# Patient Record
Sex: MALE | Race: WHITE | NOT HISPANIC OR LATINO | Employment: OTHER | URBAN - NONMETROPOLITAN AREA
[De-identification: names, ages, dates, MRNs, and addresses within clinical notes are randomized per-mention and may not be internally consistent; named-entity substitution may affect disease eponyms.]

---

## 2017-07-09 DIAGNOSIS — I10 ESSENTIAL HYPERTENSION: Primary | ICD-10-CM

## 2017-09-15 DIAGNOSIS — I10 ESSENTIAL HYPERTENSION: ICD-10-CM

## 2017-09-15 RX ORDER — ENALAPRIL MALEATE 5 MG/1
TABLET ORAL
Qty: 90 TABLET | Refills: 0 | Status: SHIPPED | OUTPATIENT
Start: 2017-09-15 | End: 2017-12-14 | Stop reason: SDUPTHER

## 2017-10-04 ENCOUNTER — OFFICE VISIT (OUTPATIENT)
Dept: CARDIOLOGY | Facility: CLINIC | Age: 72
End: 2017-10-04

## 2017-10-04 VITALS
OXYGEN SATURATION: 99 % | DIASTOLIC BLOOD PRESSURE: 90 MMHG | SYSTOLIC BLOOD PRESSURE: 181 MMHG | HEIGHT: 68 IN | BODY MASS INDEX: 23.25 KG/M2 | WEIGHT: 153.4 LBS | HEART RATE: 62 BPM

## 2017-10-04 DIAGNOSIS — I05.9 MITRAL VALVE DISORDER: ICD-10-CM

## 2017-10-04 DIAGNOSIS — R01.1 HEART MURMUR: ICD-10-CM

## 2017-10-04 DIAGNOSIS — I48.0 PAROXYSMAL ATRIAL FIBRILLATION (HCC): ICD-10-CM

## 2017-10-04 DIAGNOSIS — I10 ESSENTIAL HYPERTENSION: ICD-10-CM

## 2017-10-04 DIAGNOSIS — R00.2 PALPITATIONS: Primary | ICD-10-CM

## 2017-10-04 PROCEDURE — 99214 OFFICE O/P EST MOD 30 MIN: CPT | Performed by: INTERNAL MEDICINE

## 2017-10-04 PROCEDURE — 93000 ELECTROCARDIOGRAM COMPLETE: CPT | Performed by: INTERNAL MEDICINE

## 2017-10-04 NOTE — PROGRESS NOTES
Subjective   Michael Mallory is a 72 y.o. male     Chief Complaint   Patient presents with   • Atrial Fibrillation   • Hypertension   • Heart Murmur   • Palpitations   • Cardiac Valve Problem     mitral       PROBLEM LIST:     1. Mitral valve disorder   1.1 Echo 12/5/14 - EF 55-60%; DD II; mod mitral prolapse, severe MR; jet medially directed; mild TR; PA 40-45; mild to mod [place]  1.2 Echo 9/18/16 - mild LVH; EF 55-60%; DD II; trace AR; prolapse of the posterior leaflet of mitral valve; prolapse of the P2 segment of the posterior mitral valve leaflet; severe MR; Mod TR; PA 40-45; mild GA  2. Palpitations/ extrasystoles      Specialty Problems        Cardiology Problems    Atrial fibrillation        Hypertension        Mitral valve disorder        Palpitations        Heart murmur                HPI:  Mr. 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    He has done well since the time of his last visit.  He continues to walk a mile a day at least 5 days a week, to play basketball intermittently and to do resistance training.  He has had no change in his exercise capacity.  He denies orthopnea, PND, or lower extremity edema.  He has no dizziness, presyncope, or syncope, although he does describe mild imbalance and he is walking through his house at night with limited visual clues.    Mr. Mallory describes occasional sense extrasystoles, but no sustained tachydysrhythmia.  He specifically denies any symptoms of arterial embolic events and, in particular, of TIA or stroke.  He has no symptoms of peripheral arterial disease.  Exercise capacity and exertional dyspnea have remained unchanged.  Her graft Mr. Mallory is to follow blood pressure regularly has not done so in some period of time.  He has no riders, chills, fevers, sweats, or weight loss.        CURRENT MEDICATION:    Current Outpatient  Prescriptions   Medication Sig Dispense Refill   • aspirin 81 MG EC tablet Take  by mouth daily.     • enalapril (VASOTEC) 5 MG tablet TAKE 1 TABLET DAILY 90 tablet 0   • metoprolol tartrate (LOPRESSOR) 25 MG tablet TAKE 1 TABLET THREE TIMES A DAY (Patient taking differently: bid) 270 tablet 1     No current facility-administered medications for this visit.        ALLERGIES:    Review of patient's allergies indicates no known allergies.    PAST MEDICAL HISTORY:    Past Medical History:   Diagnosis Date   • Anxiety    • Atrial fibrillation 8/22/2016   • Hypertension 8/22/2016   • Mitral valve disorder    • Palpitations 8/22/2016       SURGICAL HISTORY:    Past Surgical History:   Procedure Laterality Date   • HEMORRHOIDECTOMY         SOCIAL HISTORY:    Social History     Social History   • Marital status:      Spouse name: N/A   • Number of children: N/A   • Years of education: N/A     Occupational History   • Not on file.     Social History Main Topics   • Smoking status: Never Smoker   • Smokeless tobacco: Never Used   • Alcohol use Yes      Comment: infrequent and very little   • Drug use: No   • Sexual activity: Not on file     Other Topics Concern   • Not on file     Social History Narrative       FAMILY HISTORY:    Family History   Problem Relation Age of Onset   • Heart attack Father        Review of Systems   Constitutional: Negative.  Negative for activity change (stable strength and stamina), chills, diaphoresis, fatigue and fever.   HENT: Negative.    Eyes: Positive for visual disturbance (glasses prn).   Respiratory: Negative.  Negative for cough, choking, chest tightness, shortness of breath (no orthopnea or PND), wheezing and stridor.    Cardiovascular: Positive for palpitations. Negative for chest pain (no failure or angina symptoms) and leg swelling.   Gastrointestinal: Negative.  Negative for abdominal pain, blood in stool (no melena, no hematuria, no hematemesis, no hematochezia),  "constipation, diarrhea, nausea and vomiting.   Endocrine: Negative.  Negative for cold intolerance and heat intolerance.   Genitourinary: Positive for frequency (during night).   Musculoskeletal: Negative.    Skin: Negative.  Negative for color change, pallor, rash and wound.   Allergic/Immunologic: Negative.    Neurological: Negative.  Negative for dizziness, tremors, seizures, syncope, facial asymmetry (no stroke like symptoms), speech difficulty, weakness, light-headedness, numbness and headaches.   Hematological: Negative.    Psychiatric/Behavioral: Positive for sleep disturbance (up to BR).       VISIT VITALS:    BP (!) 181/90 (BP Location: Left arm, Patient Position: Sitting)  Pulse 62  Ht 68\" (172.7 cm)  Wt 153 lb 6.4 oz (69.6 kg)  SpO2 99%  BMI 23.32 kg/m2    RECENT LABS:    Objective       Physical Exam   Constitutional: He is oriented to person, place, and time. He appears well-developed and well-nourished. No distress.   HENT:   Head: Normocephalic and atraumatic.   Eyes: Conjunctivae and EOM are normal. Pupils are equal, round, and reactive to light.   Neck: Normal range of motion. Neck supple. Normal carotid pulses, no hepatojugular reflux and no JVD present. Carotid bruit is present (BL transmitted murmur ). No tracheal deviation present. No thyroid mass and no thyromegaly present.   Cardiovascular: Normal rate, regular rhythm and intact distal pulses.  Exam reveals no gallop and no friction rub.    Murmur (3/6 MR murmur) heard.  PMI is laterally displaced    Pulmonary/Chest: Effort normal and breath sounds normal. No respiratory distress. He has no wheezes. He has no rales. He exhibits no tenderness.   Abdominal: Soft. Bowel sounds are normal. He exhibits no distension, no abdominal bruit and no mass. There is no tenderness. There is no rebound and no guarding.   Negative organomegaly      Musculoskeletal: Normal range of motion. He exhibits no edema, tenderness or deformity.   Lymphadenopathy: "     He has no cervical adenopathy.     He has no axillary adenopathy.   Neurological: He is alert and oriented to person, place, and time. He has normal reflexes.   Skin: Skin is warm and dry. No rash noted. No erythema. No pallor.   Psychiatric: He has a normal mood and affect. His speech is normal and behavior is normal. Judgment and thought content normal. Cognition and memory are normal.   Nursing note and vitals reviewed.        ECG 12 Lead  Date/Time: 10/4/2017 11:13 AM  Performed by: GREGOR JONES  Authorized by: GREGOR JONES   Rhythm: sinus rhythm  Rate: normal  Conduction: 1st degree  QRS axis: left  Comments: LVH, LAD              Assessment/Plan    #1.  Severe mitral regurgitation related to myxomatous mitral valve disease and prolapse of the P2 cusp of the posterior mitral leaflet.  The patient remains asymptomatic of ischemia, heart failure, dysrhythmia except as described above, endocarditis, or embolism.  #2.  We will recheck echocardiogram to assess LV end-systolic dimensions.    #3.  I've asked Mr. Mallory to follow blood pressures carefully and have encouraged him to cooperate with medical recommendations to treat hypertension to reduce stroke heart failure and renal failure risk.    #4.  The patient also understands to report immediately for any symptoms which might represent ischemia, decreased LV function, or endocarditis.    #5.  The patient will follow-up with Dr. Jaimes as instructed and in our office in 6 months or on a when necessary basis.    #6.  Not noted above the patient a recent carotid duplex study which demonstrated no significant carotid disease and antegrade flow both vertebral arteries.           Diagnosis Plan   1. Palpitations  ECG 12 Lead   2. Essential hypertension  Adult Transthoracic Echo Complete W/ Cont if Necessary Per Protocol    Adult Transthoracic Echo Complete W/ Cont if Necessary Per Protocol   3. Heart murmur  Adult Transthoracic Echo Complete W/ Cont if  Necessary Per Protocol    Adult Transthoracic Echo Complete W/ Cont if Necessary Per Protocol   4. Mitral valve disorder  Adult Transthoracic Echo Complete W/ Cont if Necessary Per Protocol    Adult Transthoracic Echo Complete W/ Cont if Necessary Per Protocol   5. Paroxysmal atrial fibrillation         Return in about 6 months (around 4/4/2018), or if symptoms worsen or fail to improve, for Next scheduled follow up.         Volodymyr Syed MD   Scribed for Dr. Volodymyr Syed by HARPAL Aguirre October 4, 2017 12:07 PM

## 2017-10-04 NOTE — PATIENT INSTRUCTIONS
Monitor Heart rate and blood pressure. Call our office in 2 weeks with readings and will adjust BP meds from readings if need to be.

## 2017-10-25 ENCOUNTER — HOSPITAL ENCOUNTER (OUTPATIENT)
Dept: CARDIOLOGY | Facility: HOSPITAL | Age: 72
Discharge: HOME | End: 2017-10-25
Attending: INTERNAL MEDICINE | Admitting: INTERNAL MEDICINE

## 2017-10-25 ENCOUNTER — OUTSIDE FACILITY SERVICE (OUTPATIENT)
Dept: CARDIOLOGY | Facility: CLINIC | Age: 72
End: 2017-10-25

## 2017-10-25 PROCEDURE — 93306 TTE W/DOPPLER COMPLETE: CPT

## 2017-10-25 PROCEDURE — 93306 TTE W/DOPPLER COMPLETE: CPT | Performed by: INTERNAL MEDICINE

## 2017-10-30 ENCOUNTER — DOCUMENTATION (OUTPATIENT)
Dept: CARDIOLOGY | Facility: CLINIC | Age: 72
End: 2017-10-30

## 2017-10-31 ENCOUNTER — OFFICE VISIT (OUTPATIENT)
Dept: CARDIOLOGY | Facility: CLINIC | Age: 72
End: 2017-10-31

## 2017-10-31 VITALS
SYSTOLIC BLOOD PRESSURE: 194 MMHG | HEART RATE: 64 BPM | BODY MASS INDEX: 23.76 KG/M2 | OXYGEN SATURATION: 99 % | DIASTOLIC BLOOD PRESSURE: 99 MMHG | HEIGHT: 68 IN | WEIGHT: 156.8 LBS

## 2017-10-31 DIAGNOSIS — R06.02 SHORTNESS OF BREATH: Primary | ICD-10-CM

## 2017-10-31 DIAGNOSIS — I34.0 MITRAL VALVE INSUFFICIENCY, UNSPECIFIED ETIOLOGY: ICD-10-CM

## 2017-10-31 DIAGNOSIS — I27.20 PULMONARY HYPERTENSION (HCC): ICD-10-CM

## 2017-10-31 PROCEDURE — 99213 OFFICE O/P EST LOW 20 MIN: CPT | Performed by: PHYSICIAN ASSISTANT

## 2017-10-31 NOTE — PROGRESS NOTES
Problem list     Subjective   Michael Mallory is a 72 y.o. male     Chief Complaint   Patient presents with   • Palpitations     presents as a follow up       HPI    PROBLEM LIST:      1. Mitral valve disorder   1.1 Echo 12/5/14 - EF 55-60%; DD II; mod mitral prolapse, severe MR; jet medially directed; mild TR; PA 40-45; mild to mod [place]  1.2 Echo 9/18/16 - mild LVH; EF 55-60%; DD II; trace AR; prolapse of the posterior leaflet of mitral valve; prolapse of the P2 segment of the posterior mitral valve leaflet; severe MR; Mod TR; PA 40-45; mild FL  1.3 recent echocardiogram October 2017 demonstrated severe mitral regurgitation with pulmonary pressures estimated at 50-55 mmHg, moderate severe left atrial dilation and mild dilation of the left ventricle.  Moderate tricuspid regurgitation noted.  2. Palpitations/ extrasystoles     Patient is a 72-year-old male that presents back for routine follow up and review echocardiogram findings.  We'll follow patient in regards to severe mitral regurgitation and mitral prolapse.  Apparently, he has had this since the 1990s but has never had mitral valve repair.  Patient approximately 2 years ago was recommended to undergo repair but refuses.  He has excellent functional status even now.  He describes walking and playing basketball 5 times a week with no limitation.  He has no chest pain or shortness of breath whatsoever.  He denies PND orthopnea.  He doesn't palpitate or have dysrhythmic symptoms.  Patient describes that he is feeling great and otherwise is doing well.    Echocardiogram was reviewed with pertinent findings as above      Outpatient Encounter Prescriptions as of 10/31/2017   Medication Sig Dispense Refill   • aspirin 81 MG EC tablet Take 81 mg by mouth Daily.     • enalapril (VASOTEC) 5 MG tablet TAKE 1 TABLET DAILY 90 tablet 0   • metoprolol tartrate (LOPRESSOR) 25 MG tablet TAKE 1 TABLET THREE TIMES A DAY (Patient taking differently: patient takes BID) 270 tablet 1  "    No facility-administered encounter medications on file as of 10/31/2017.        Review of patient's allergies indicates no known allergies.    Past Medical History:   Diagnosis Date   • Anxiety    • Atrial fibrillation 8/22/2016   • Hypertension 8/22/2016   • Mitral valve disorder    • Palpitations 8/22/2016       Social History     Social History   • Marital status:      Spouse name: N/A   • Number of children: N/A   • Years of education: N/A     Occupational History   • Not on file.     Social History Main Topics   • Smoking status: Never Smoker   • Smokeless tobacco: Never Used   • Alcohol use Yes      Comment: infrequent and very little   • Drug use: No   • Sexual activity: Not on file     Other Topics Concern   • Not on file     Social History Narrative       Family History   Problem Relation Age of Onset   • Heart attack Father        Review of Systems   Constitutional: Negative.    HENT: Negative.    Eyes: Negative.    Respiratory: Positive for shortness of breath.    Cardiovascular: Negative.    Gastrointestinal: Negative.    Endocrine: Negative.    Genitourinary: Positive for frequency.   Musculoskeletal: Negative.    Skin: Negative.    Allergic/Immunologic: Negative.    Neurological: Negative.    Hematological: Negative.    Psychiatric/Behavioral: The patient is nervous/anxious.        Objective   Vitals:    10/31/17 1306   BP: (!) 194/99   BP Location: Left arm   Patient Position: Sitting   Pulse: 64   SpO2: 99%   Weight: 156 lb 12.8 oz (71.1 kg)   Height: 68\" (172.7 cm)      BP (!) 194/99 (BP Location: Left arm, Patient Position: Sitting)  Pulse 64  Ht 68\" (172.7 cm)  Wt 156 lb 12.8 oz (71.1 kg)  SpO2 99%  BMI 23.84 kg/m2    Lab Results (most recent)     None          Physical Exam   Constitutional: He is oriented to person, place, and time. He appears well-developed and well-nourished. No distress.   HENT:   Head: Normocephalic and atraumatic.   Eyes: EOM are normal. Pupils are equal, " round, and reactive to light.   Neck: No JVD present.   Cardiovascular: Normal rate, regular rhythm and normal heart sounds.  Exam reveals no gallop and no friction rub.    No murmur heard.  Severe MR murmur noted on exam on the entire precordium   Pulmonary/Chest: Effort normal and breath sounds normal. No respiratory distress. He has no wheezes. He has no rales. He exhibits no tenderness.   Abdominal: Soft.   Musculoskeletal: Normal range of motion. He exhibits no edema.   Neurological: He is alert and oriented to person, place, and time. No cranial nerve deficit.   Skin: Skin is warm and dry. No rash noted. No erythema. No pallor.   Psychiatric: He has a normal mood and affect. His behavior is normal.   Nursing note and vitals reviewed.      Procedure   Procedures       Assessment/Plan     Problems Addressed this Visit        Cardiovascular and Mediastinum    Mitral valve insufficiency    Relevant Orders    Adult Transthoracic Echo Complete W/ Cont if Necessary Per Protocol    Pulmonary hypertension    Relevant Orders    Adult Transthoracic Echo Complete W/ Cont if Necessary Per Protocol       Respiratory    Shortness of breath - Primary    Relevant Orders    Adult Transthoracic Echo Complete W/ Cont if Necessary Per Protocol            Recommendation  1.  I am concerned about increasing pulmonary pressures noted on this echocardiogram.  However, patient describes to me that he has excellent functional status and does not want to do any surgery at this time.  He is aware of the concern about declining critical mitral valve.  He does not want intervention at this time.  He has had discussion by multiple providers in the office recommending mitral valve replacement.  He refuses.  I would like to reevaluate his mitral regurgitation in a few months.  I discussed extensively that if he notices any change in symptoms such as chest pain shortness of breath or failure symptoms as discussed, he is to contact our office  immediately.  Otherwise, he is doing well at this time.  Blood pressure is well-controlled although elevated today.  He describes systolic readings in the 140s.  We will see back for follow-up after echocardiogram in a few months.  Follow-up primary as scheduled

## 2017-12-14 DIAGNOSIS — I10 ESSENTIAL HYPERTENSION: ICD-10-CM

## 2017-12-14 RX ORDER — ENALAPRIL MALEATE 5 MG/1
TABLET ORAL
Qty: 90 TABLET | Refills: 0 | Status: SHIPPED | OUTPATIENT
Start: 2017-12-14 | End: 2018-03-14 | Stop reason: SDUPTHER

## 2018-01-06 DIAGNOSIS — I10 ESSENTIAL HYPERTENSION: ICD-10-CM

## 2018-03-14 DIAGNOSIS — I10 ESSENTIAL HYPERTENSION: ICD-10-CM

## 2018-03-14 RX ORDER — ENALAPRIL MALEATE 5 MG/1
TABLET ORAL
Qty: 90 TABLET | Refills: 2 | Status: SHIPPED | OUTPATIENT
Start: 2018-03-14 | End: 2018-08-01 | Stop reason: ALTCHOICE

## 2018-04-25 ENCOUNTER — CLINICAL SUPPORT (OUTPATIENT)
Dept: CARDIOLOGY | Facility: CLINIC | Age: 73
End: 2018-04-25

## 2018-04-25 VITALS
DIASTOLIC BLOOD PRESSURE: 98 MMHG | BODY MASS INDEX: 23.67 KG/M2 | SYSTOLIC BLOOD PRESSURE: 163 MMHG | HEIGHT: 68 IN | OXYGEN SATURATION: 96 % | HEART RATE: 140 BPM | WEIGHT: 156.2 LBS

## 2018-04-25 DIAGNOSIS — I73.9 CLAUDICATION (HCC): ICD-10-CM

## 2018-04-25 DIAGNOSIS — R00.2 PALPITATIONS: Primary | ICD-10-CM

## 2018-04-25 DIAGNOSIS — I48.0 PAROXYSMAL ATRIAL FIBRILLATION (HCC): ICD-10-CM

## 2018-04-25 PROCEDURE — 93000 ELECTROCARDIOGRAM COMPLETE: CPT | Performed by: PHYSICIAN ASSISTANT

## 2018-04-25 NOTE — PROGRESS NOTES
Michael Mallory  1945 4/25/2018   ?   Chief Complaint   Patient presents with   • Palpitations     patient presents for a nurse visit       ?   HPI: patient presents today for a nurse visit. Patient states he has been having some palpitations and anxiety issues. Patient called our office with concerns. He is here for an EKG today.   ?   ?   ?     Current Outpatient Prescriptions:   •  aspirin 81 MG EC tablet, Take 81 mg by mouth Daily., Disp: , Rfl:   •  enalapril (VASOTEC) 5 MG tablet, TAKE 1 TABLET DAILY, Disp: 90 tablet, Rfl: 2  •  metoprolol tartrate (LOPRESSOR) 25 MG tablet, TAKE 1 TABLET THREE TIMES A DAY (Patient taking differently: TAKE 1 TABLET twice TIMES A DAY), Disp: 270 tablet, Rfl: 1   ?   ?   Review of patient's allergies indicates no known allergies.         ECG 12 Lead  Date/Time: 4/25/2018 4:11 PM  Performed by: OMARI MCLAUGHLIN  Authorized by: OMARI MCLAUGHLIN   Comments: Palpitations   A fib               ?   Assessment/Plan  Per Omari Mclaughlin PA-C: EKG: showed MAT. We will order Cardiac Event Monitor. We will also order a ultrasound of carotids.       ?   ?   ?

## 2018-07-07 DIAGNOSIS — I10 ESSENTIAL HYPERTENSION: ICD-10-CM

## 2018-08-01 ENCOUNTER — OFFICE VISIT (OUTPATIENT)
Dept: CARDIOLOGY | Facility: CLINIC | Age: 73
End: 2018-08-01

## 2018-08-01 VITALS
HEIGHT: 68 IN | SYSTOLIC BLOOD PRESSURE: 181 MMHG | BODY MASS INDEX: 23.19 KG/M2 | HEART RATE: 64 BPM | WEIGHT: 153 LBS | DIASTOLIC BLOOD PRESSURE: 103 MMHG | OXYGEN SATURATION: 99 %

## 2018-08-01 DIAGNOSIS — I10 ESSENTIAL HYPERTENSION: ICD-10-CM

## 2018-08-01 DIAGNOSIS — I27.20 PULMONARY HYPERTENSION (HCC): ICD-10-CM

## 2018-08-01 DIAGNOSIS — I34.0 MITRAL VALVE INSUFFICIENCY, UNSPECIFIED ETIOLOGY: ICD-10-CM

## 2018-08-01 DIAGNOSIS — I48.0 PAROXYSMAL ATRIAL FIBRILLATION (HCC): Primary | ICD-10-CM

## 2018-08-01 DIAGNOSIS — I05.9 MITRAL VALVE DISORDER: ICD-10-CM

## 2018-08-01 DIAGNOSIS — R06.02 SHORTNESS OF BREATH: ICD-10-CM

## 2018-08-01 DIAGNOSIS — R01.1 HEART MURMUR: ICD-10-CM

## 2018-08-01 DIAGNOSIS — R00.2 PALPITATIONS: ICD-10-CM

## 2018-08-01 PROCEDURE — 99214 OFFICE O/P EST MOD 30 MIN: CPT | Performed by: INTERNAL MEDICINE

## 2018-08-01 RX ORDER — OLMESARTAN MEDOXOMIL 40 MG/1
40 TABLET ORAL DAILY
Qty: 30 TABLET | Refills: 5 | Status: SHIPPED | OUTPATIENT
Start: 2018-08-01 | End: 2018-09-24

## 2018-08-01 NOTE — PROGRESS NOTES
Subjective   Michael Mallory is a 72 y.o. male     Chief Complaint   Patient presents with   • Palpitations     presents as a followup   • Atrial Fibrillation       PROBLEM LIST:       1. Mitral valve disorder   1.1 Echo 12/5/14 - EF 55-60%; DD II; mod mitral prolapse, severe MR; jet medially directed; mild TR; PA 40-45; mild to mod UT  1.2 Echo 9/18/16 - mild LVH; EF 55-60%; DD II; trace AR; prolapse of the posterior leaflet of mitral valve; prolapse of the P2 segment of the posterior mitral valve leaflet; severe MR; Mod TR; PA 40-45; mild UT  1.3 recent echocardiogram October 2017 demonstrated severe mitral regurgitation with pulmonary pressures estimated at 50-55 mmHg, moderate severe left atrial dilation and mild dilation of the left ventricle.  Moderate tricuspid regurgitation noted.  2. Palpitations/ extrasystoles  3. Atrial Fib  3.1 CHADS score 2        Specialty Problems        Cardiology Problems    Atrial fibrillation (CMS/HCC)        Hypertension        Mitral valve disorder        Palpitations        Heart murmur        Mitral valve insufficiency        Pulmonary hypertension                HPI:  Mr. 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    He relates an episode of atrial fibrillation earlier this year which was corroborated by EKG done in our office.  Symptoms resolved spontaneously.  Mr. Mallory describes a mild decrease in exercise capacity, but he continues to be without orthopnea, PND, lower extremity edema, or significant dizziness.    The patient brings a blood pressure log which shows significant elevation of systemic pressures over a prolonged period of time despite the patient's current medical regimen.    I reviewed several of the patient's prior studies.  There is been a gradual progression of pulmonary pressures on sequential echocardiograms, and left ventricular systolic performance was at the lower  limits of normal on the patient's most recent study.  As per documentation and multiple previous notes, the patient will not consider mitral valve replacement or repair.            CURRENT MEDICATION:    Current Outpatient Prescriptions   Medication Sig Dispense Refill   • aspirin 81 MG EC tablet Take 81 mg by mouth Daily.     • enalapril (VASOTEC) 5 MG tablet TAKE 1 TABLET DAILY 90 tablet 2   • metoprolol tartrate (LOPRESSOR) 25 MG tablet TAKE 1 TABLET THREE TIMES A  tablet 1     No current facility-administered medications for this visit.        ALLERGIES:    Patient has no known allergies.    PAST MEDICAL HISTORY:    Past Medical History:   Diagnosis Date   • Anxiety    • Atrial fibrillation (CMS/HCC) 8/22/2016   • Hypertension 8/22/2016   • Mitral valve disorder    • Palpitations 8/22/2016       SURGICAL HISTORY:    Past Surgical History:   Procedure Laterality Date   • HEMORRHOIDECTOMY     • HERNIA REPAIR         SOCIAL HISTORY:    Social History     Social History   • Marital status:      Spouse name: N/A   • Number of children: N/A   • Years of education: N/A     Occupational History   • Not on file.     Social History Main Topics   • Smoking status: Never Smoker   • Smokeless tobacco: Never Used   • Alcohol use Yes      Comment: infrequent and very little   • Drug use: No   • Sexual activity: Not on file     Other Topics Concern   • Not on file     Social History Narrative   • No narrative on file       FAMILY HISTORY:    Family History   Problem Relation Age of Onset   • Heart attack Father        Review of Systems   Constitutional: Negative for fatigue.   HENT: Negative.    Eyes: Positive for visual disturbance ( wears glasses).   Respiratory: Positive for shortness of breath (mildly).    Cardiovascular: Negative for chest pain, palpitations and leg swelling.   Gastrointestinal: Negative for constipation and diarrhea.   Endocrine: Negative.    Genitourinary: Positive for frequency. Negative  "for difficulty urinating.   Musculoskeletal: Negative for arthralgias, back pain and myalgias.   Skin: Negative.    Allergic/Immunologic: Negative for environmental allergies and food allergies.   Neurological: Negative for dizziness and light-headedness.   Hematological: Does not bruise/bleed easily.   Psychiatric/Behavioral: Negative for sleep disturbance. The patient is nervous/anxious.        VISIT VITALS:  Vitals:    08/01/18 0915   BP: (!) 181/103   BP Location: Left arm   Patient Position: Sitting   Pulse: 64   SpO2: 99%   Weight: 69.4 kg (153 lb)   Height: 172.7 cm (68\")      BP (!) 181/103 (BP Location: Left arm, Patient Position: Sitting)   Pulse 64   Ht 172.7 cm (68\")   Wt 69.4 kg (153 lb)   SpO2 99%   BMI 23.26 kg/m²     RECENT LABS:    Objective       Physical Exam   Constitutional: He is oriented to person, place, and time. He appears well-developed and well-nourished. No distress.   HENT:   Head: Normocephalic and atraumatic.   Eyes: Pupils are equal, round, and reactive to light. Conjunctivae and EOM are normal.   Neck: Normal range of motion. Neck supple. Normal carotid pulses, no hepatojugular reflux and no JVD present. Carotid bruit is present (Bilateral Transmitted murmur). No tracheal deviation present. No thyroid mass and no thyromegaly present.   Cardiovascular: Normal rate, regular rhythm and intact distal pulses.    Murmur (5/6 MR murmur) heard.  Pulses:       Carotid pulses are 2+ on the right side, and 2+ on the left side.       Radial pulses are 2+ on the right side, and 2+ on the left side.        Dorsalis pedis pulses are 2+ on the right side, and 2+ on the left side.        Posterior tibial pulses are 2+ on the right side, and 2+ on the left side.   Decreased S1  S2 splits physiologically.   Pulmonary/Chest: Effort normal and breath sounds normal. No respiratory distress. He has no decreased breath sounds. He has no wheezes. He has no rhonchi. He has no rales. He exhibits no " tenderness.   Abdominal: Soft. Bowel sounds are normal. He exhibits no distension, no abdominal bruit (neg. for organomegaly) and no mass. There is no splenomegaly or hepatomegaly. There is no tenderness. There is no rebound and no guarding.   MR murmur transmitted to abd.   Musculoskeletal: Normal range of motion. He exhibits no edema, tenderness or deformity.   Neurological: He is alert and oriented to person, place, and time.   Skin: Skin is warm and dry. No rash noted. He is not diaphoretic. No erythema. No pallor.   Psychiatric: He has a normal mood and affect. His speech is normal and behavior is normal. Judgment and thought content normal. Cognition and memory are normal.   Nursing note and vitals reviewed.      Procedures      Assessment/Plan   #1.  Myxomatous mitral valve disease with severe mitral regurgitation, left atrial enlargement, and moderately severe pulmonary hypertension with progression of PA pressures over time.  I'm very concerned that LV systolic performance is at the lower limits of normal which may portend a poor outcome with valve replacement.  It is likely that mitral valve repair could be tolerated however.    #2.  Paroxysmal atrial fibrillation.  The patient has a chads vas 2 score of 2 for age and hypertension.  We had an extended and detailed discussion about the need for anticoagulation for stroke prophylaxis in the setting of paroxysmal atrial fibrillation.  Therefore, Mr. Mallory agreed to start liquids 5 mg twice daily.  I impressed upon the patient, at multiple times throughout that discussion, that he cannot start and stop that medication of his own accord.  He expressed understanding.  He also understands to monitor for bleeding and that a look was would have no effect on progression of his valvular disease or hypertension as he queried on several occasions.    #3.  I again reiterated with Mr. Mallory symptoms which might represent valve progression and also tried to persuade him  to pursue further evaluation given the clinical and echocardiographic findings described above.  The patient expressed no interest in that regard.  #4.  For better blood pressure control we will stop enalapril and start Benicar 40 mg daily and uptitrate to tolerance and affect.  He performed through Dr. Jaimes office at the patient's preference.    #5.  Mr. Mallory will follow-up with Dr. Jaimes per her instructions, and in our office in 6 months or on a when necessary basis for symptoms or medication intolerances discussed in detail today.   Diagnosis Plan   1. Paroxysmal atrial fibrillation (CMS/HCC)     2. Essential hypertension     3. Palpitations     4. Heart murmur     5. Mitral valve disorder     6. Mitral valve insufficiency, unspecified etiology     7. Pulmonary hypertension     8. Shortness of breath         No Follow-up on file.         .     Patient's Body mass index is 23.26 kg/m². BMI is within normal parameters. No follow-up required.       Karolina Dang LPN        Electronically signed by:            This note is dictated utilizing voice recognition software.  Although this record has been proof read, transcriptional errors may still be present. If questions occur regarding the content of this record please do not hesitate to call our office.

## 2018-08-15 ENCOUNTER — TELEPHONE (OUTPATIENT)
Dept: CARDIOLOGY | Facility: CLINIC | Age: 73
End: 2018-08-15

## 2018-08-15 DIAGNOSIS — I10 ESSENTIAL HYPERTENSION: ICD-10-CM

## 2018-08-15 NOTE — TELEPHONE ENCOUNTER
DISCUSSED BELOW WITH CHRISTOPHER GARCIA AND HE RECOMMENDED PATIENT GO BACK TO THE 20 MG DAILY SINCE HAD LITTLE/NO SX'S AT ALL, MONITOR B/P AND IF AFTER A COUPLE OF WEEKS SX'S WORSEN/B/P STAYS ELEVATED ETC. TO CALL THE OFFICE. VERBALIZED HE WOULD TRY IT. PH,LPN           PATIENT STATES ON THE EVENING AFTER HE WAS SEEN HERE AT THE OFFICE HIS B/P /88, 141/87. STATES HE STARTED THE BENICAR AT 20 MG FOR A FEW DAYS AND THE LAST FEW DAYS HE STARTED TAKING THE PRESCRIBED 40 MG PO DAILY. WHILE ON THE 20 MG TAB HE STATES SX'S WERENT AS BAD OR HAD ANY AT ALL. ON THE 40 MG TAB HE HAS HAD INSOMNIA, MUSCLE ACHES AND DRY COUGH. HE THINKS THE 40 MG TAB IS DOING WHAT ITS SUPPOSED TO DO BECAUSE HIS B/P ON 40 MG YEST. /74 DAY BEFORE YEST. AND YEST. 112/68 H/R 69-73, BUT HE CAN'T TOLERATE IT. HE IS WANTING TO GO BACK ON THE ENALAPRIL, BECCAUSE HE HAS BEEN ON IT FOR YEARS AND HE KNOWS HE CAN TOLERATE IT. TOLD HIM I WOULD HAVE TO DISCUSS IT WITH DR. JONES AND WOULD CALL HIM BACK. DAMARIS ARCHULETA      ----- Message from Jesusita Nugent MA sent at 8/15/2018 11:57 AM EDT -----  Patient is calling to report blood pressures after being started on medications. Call back is [phone]

## 2018-08-20 ENCOUNTER — TELEPHONE (OUTPATIENT)
Dept: CARDIOLOGY | Facility: CLINIC | Age: 73
End: 2018-08-20

## 2018-08-20 DIAGNOSIS — I34.1 MVP (MITRAL VALVE PROLAPSE): ICD-10-CM

## 2018-08-20 DIAGNOSIS — I34.0 SEVERE MITRAL REGURGITATION: ICD-10-CM

## 2018-08-20 DIAGNOSIS — I05.9 MITRAL VALVE DISORDER: Primary | ICD-10-CM

## 2018-08-20 NOTE — TELEPHONE ENCOUNTER
PER  OK TO REFER TO [facility]  WITH WHOMEVER PATIENT WANTS TO SEE. PATIENT IS REQUESTING DR. ANTONIO ORO IF AT ALL POSSIBLE, BUT HE WANTS FIRST AVAILABLE WITH [employer]. PATIENT REQUESTING A CALL ONCE RECORDS FAXED TO [facility]. REFERRAL ORDER ENTERED. PATIENT HAD ALSO DROPPED OFF FMLA PAPERS. PER DR. JONES, FAMILY PCP NEEDS TO FILL OUT, BUT THAT WE CAN SUPPLY RECORDS IF NEEDED. PATIENT AWARE OF THIS, AND TO COME BACK BY OFFICE AND  FORMS. PH,LPN              PATIENT WALKED IN STATING HE WENT TO THE ER YEST. FOR COUGH, CHEST HURTING,SLEEPING ISSUES ETC. HAD LABS AND CXR DONE. ER RECORDS PRINTED AND TO BE REVIEWED BY DR. JONES. HE IS NOW AGREEING TO HAVE THE MV REPAIR DONE AS RECOMMENDED AT PREVIOUS VISIT ON 10-31-17 WHEN HE REFUSED IT.  HE MENTIONED GOING TO [facility] FOR TO HAVE THIS DONE. TOLD HIM I WOULD HAVE DISCUSS THIS WITH DR. JONES AND GET BACK WITH HIM HOPEFULLY DOMINIQUE. DAMARIS ARCHULETA

## 2018-08-22 ENCOUNTER — TELEPHONE (OUTPATIENT)
Dept: CARDIOLOGY | Facility: CLINIC | Age: 73
End: 2018-08-22

## 2018-08-22 ENCOUNTER — LAB (OUTPATIENT)
Dept: LAB | Facility: HOSPITAL | Age: 73
End: 2018-08-22

## 2018-08-22 DIAGNOSIS — I48.0 PAROXYSMAL ATRIAL FIBRILLATION (HCC): ICD-10-CM

## 2018-08-22 DIAGNOSIS — R06.02 SHORTNESS OF BREATH: ICD-10-CM

## 2018-08-22 DIAGNOSIS — R06.02 SHORTNESS OF BREATH: Primary | ICD-10-CM

## 2018-08-22 PROCEDURE — 83880 ASSAY OF NATRIURETIC PEPTIDE: CPT | Performed by: NURSE PRACTITIONER

## 2018-08-22 PROCEDURE — 80048 BASIC METABOLIC PNL TOTAL CA: CPT | Performed by: NURSE PRACTITIONER

## 2018-08-22 PROCEDURE — 83735 ASSAY OF MAGNESIUM: CPT | Performed by: NURSE PRACTITIONER

## 2018-08-22 PROCEDURE — 36415 COLL VENOUS BLD VENIPUNCTURE: CPT

## 2018-08-22 RX ORDER — FUROSEMIDE 20 MG/1
20 TABLET ORAL DAILY PRN
Qty: 30 TABLET | Refills: 11 | Status: SHIPPED | OUTPATIENT
Start: 2018-08-22 | End: 2018-09-24

## 2018-08-22 NOTE — TELEPHONE ENCOUNTER
SEE PREVIOUS TELEPHONE NOTE IN CHART. PER  FMLA PAPERS TO BE FILLED OUT BY FAMILY PCP. PATIENT AWARE. PH,LPN        ----- Message from Dorothy Avilez sent at 8/22/2018  3:44 PM EDT -----  The patient dropped off paperwork for fmla earlier today.  His employer called to let us know that those need to be faxed to them at [phone] ATTN: Gretchen once they are completed.  thanks

## 2018-08-22 NOTE — TELEPHONE ENCOUNTER
Patient walked in.  Apparently he is being referred for Mitral Valve replacement after being in ER.  He says that he is out of his Lasix and feels like he his crackling when he lays down at night.  I will resend in his lasix to use as needed.  I will get a BMP, BNP and MG today. I discussed this with Dr. Syed.  I also advised Karolina that I helped the patient, however, referral still needs to be put in.  Verbal understanding given.

## 2018-08-23 ENCOUNTER — TELEPHONE (OUTPATIENT)
Dept: CARDIOLOGY | Facility: CLINIC | Age: 73
End: 2018-08-23

## 2018-08-23 LAB
ANION GAP SERPL CALCULATED.3IONS-SCNC: 8.8 MMOL/L (ref 3.6–11.2)
BUN BLD-MCNC: 16 MG/DL (ref 7–21)
BUN/CREAT SERPL: 16 (ref 7–25)
CALCIUM SPEC-SCNC: 9.6 MG/DL (ref 7.7–10)
CHLORIDE SERPL-SCNC: 99 MMOL/L (ref 99–112)
CO2 SERPL-SCNC: 26.2 MMOL/L (ref 24.3–31.9)
CREAT BLD-MCNC: 1 MG/DL (ref 0.43–1.29)
GFR SERPL CREATININE-BSD FRML MDRD: 73 ML/MIN/1.73
GLUCOSE BLD-MCNC: 127 MG/DL (ref 70–110)
MAGNESIUM SERPL-MCNC: 2.4 MG/DL (ref 1.7–2.6)
OSMOLALITY SERPL CALC.SUM OF ELEC: 271 MOSM/KG (ref 273–305)
POTASSIUM BLD-SCNC: 4.5 MMOL/L (ref 3.5–5.3)
SODIUM BLD-SCNC: 134 MMOL/L (ref 135–153)

## 2018-08-23 NOTE — TELEPHONE ENCOUNTER
LAB RESULTS BRIEFLY DISCUSSED WITH PATIENT AND AWARE BNP STILL PENDING. I TOLD HIM I WOULD ASK DR. JONES AGAIN IF WE CAN FILL OUT HIS FMLA PAPERS FIRST OF THE WEEK. HE STATES HE RARELY SEE'S DR. MOTLEY AND SHE DOES NOT KNOW HIS CARDIAC HISTORY AND WE HAVE ALL HIS RECORDS. DAMARIS ARCHULETA          ----- Message from ANA Contreras sent at 8/23/2018  4:25 PM EDT -----  This is that patient of Kunal's from yesterday.  Can you please advise him of his labs.  Mainly looking at K, Cr and Mg.  Still waiting on BNP.  Thanks!

## 2018-08-24 ENCOUNTER — TELEPHONE (OUTPATIENT)
Dept: CARDIOLOGY | Facility: CLINIC | Age: 73
End: 2018-08-24

## 2018-08-24 LAB — NT-PROBNP SERPL-MCNC: 695 PG/ML (ref 0–376)

## 2018-08-24 NOTE — TELEPHONE ENCOUNTER
PATIENT NOT AT HOME, BUT SPOKE WITH HIS WIFE AND SHE STATES SHE THINKS HE HAS ALREADY SENT THE ECHO FILMS TO [facility]. GEREMIAS,AGNESN      ----- Message from Shanel Jackson sent at 8/24/2018  7:42 AM EDT -----   I spoke with Loulou beckford and she said that the patient picked up a copy of his echo on Wednesday to take with him.

## 2018-08-27 ENCOUNTER — TELEPHONE (OUTPATIENT)
Dept: CARDIOLOGY | Facility: CLINIC | Age: 73
End: 2018-08-27

## 2018-08-27 NOTE — TELEPHONE ENCOUNTER
Called and l/m at home number with BNP results. GEREMIAS,LPN          ----- Message from ANA Contreras sent at 8/27/2018  7:44 AM EDT -----  Kunal's patient.  Please advise BNP just slightly elevated.

## 2018-09-04 ENCOUNTER — TELEPHONE (OUTPATIENT)
Dept: CARDIOLOGY | Facility: CLINIC | Age: 73
End: 2018-09-04

## 2018-09-04 NOTE — TELEPHONE ENCOUNTER
"CHERY YING, JUANA AT [facility] HAD L/M THAT PATIENT WAS REFERRED TO THEM FOR \"BYPASS SURGERY\" AND THAT THEY HAD TO START HIM ON COUMADIN AFTER SURGERY AND THAT HE WAS AGREEABLE TO THIS. STATED HE WOULD BE DC'D WITHIN A DAY OR TWO AND WANTED TO SEE IF OUR OFFICE WOULD MANAGE HIS PT/INR. OK PER CHRISTOPHER QUEVEDO THAT OUR OFFICE WILL MANAGE HIS INR'S. I CALLED HER BACK AND SHE STATED THAT THEY ACTUALLY DID A MITRAL VALVE REPAIR, HE HAD REFUSED THE MAZE PROCEDURE, BUT THEY DID CLIP HIS LEFT ATRIAL APPENDAGE SINCE HISTORY OF A-FIB. STATED THAT THEIR DOCTOR'S DO NOT LIKE STARTING THEM ON ELIQUIS DIRECTLY POST-OP DUE TO BLEEDING RISK, BUT AFTER A MONTH POST-OP HE MAY BE ABLE TO BE SWITCHED OVER TO ELIQUIS THEN. STATED WHEN HE IS DC' D HOME SHE WILL FAX HIS D/C SUMMARY TO OUR OFFICE WITH HIS CURRENT COUMADIN DOSE AND INR RESULTS AND SHE IS GOING TOP INSTRUCT HIM TO CONTACT OUR OFFICE WITH DATE FOR NEXT DRAW. STATED THIS WOULD BE GREAT. WILL LET DR. JONES KNOW DOMINIQUE. ABOUT ABOVE. PH,LPN  "

## 2018-09-10 ENCOUNTER — ANTICOAGULATION VISIT (OUTPATIENT)
Dept: CARDIOLOGY | Facility: CLINIC | Age: 73
End: 2018-09-10

## 2018-09-10 ENCOUNTER — TELEPHONE (OUTPATIENT)
Dept: CARDIOLOGY | Facility: CLINIC | Age: 73
End: 2018-09-10

## 2018-09-10 LAB — INR PPP: 2.1 (ref 2–3)

## 2018-09-10 NOTE — PROGRESS NOTES
HAD BEEN ON 2 MG DAILY, BUT PER [facility] D/C INSTRUCTIONS HE WAS TO TAKE 1 MG S/S. HE ALSO TOOK LAST DOSE OF FLAGYL THIS AM FOR C-DIFF WHILE IN HOSP. NO DOSAGE CHANGE AND HAVE [facility] RECHECK LEVEL ON MONDAY PER CHRISTOPHER QUEVEDO. PATIENT HAS  AF/U APPT. AT  ON Thursday AND WILL BE STAYING OVERNIGHT SINCE 6 [address]. VERBAL ORDERS READ BACK AND VERIFIED BY CHRISTOPHER QUEVEDO. PATIENT'S WIFE AWARE VERBALIZED OK. PH,LPN

## 2018-09-10 NOTE — TELEPHONE ENCOUNTER
POLLY WITH [facility] HAD L/M THAT THEY ARE STARTING FIRST HOME CARE VISIT TODAY WITH HIM AND HE WAS REQUESTING A HOME PT/INR MONITOR. I SPOKE WITH HER AND SHE IS AWARE SINCE HE IS A NEW COUMADIN START AND DUE TO HIS INSURANCE WOULD PROBABLY NOT PAY FOR IT, AND THE FACT HE IS PROBABLY NOT GOING TO BE ON THE COUMADIN BUT FOR 3 MO. APPROX. HE WOULD NOT QUALIFY FOR A HOME MONITOR. SHE IS GOING TO LET HIM KNOW THIS. SHE ALSO STATED THEY HAD RECEIVED HIS HOSP. DISCHARGE INSTRUCTIONS AND HE IS TO HAVE A INR DRAWN TODAY. THEY ARE TO CALL THESE RESULTS TO ME ONCE THEY ARE BACK. IN MEANTIME PATIENT'S WIFE WALKED IN TO  HIS FMLA PAPEROWRK WE HAD FILLED OUT, SHE WAS TELLING ME [facility] HAD ADJUSTED HIS B/P MEDS AND HIS B/P THIS AM WAS 98/65 H/R 58, DENIES ANY DIZZINESS/PASSING OUT, JUST WEAK. STATES HE TOOK HIS LAST DOSE OF FLAGYL THIS AM FOR THE C-DIFF HE GOT IN THE HOSP. HE WAS TAKING 2 MG DAILY ON HIS COUMADIN, BUT SAT AND SUND. HE WAS INSTRUCTED TO TAKE 1 MG. CALLED AND SPOKE WITH VIKA IN MED. RECORDS AT [facility] TO GET RECORDS FROM ADMISSION 8-26-18 - 9-6-18, ALL CARDIA RECORDS, SATTED SHE WOULD GET THEM TO US. F/U APPT. FOR HERE AT THE OFFICE SCHEDULED 9-24-18 WITH DR. JONES, WIFE GIVEN APPT. DATE AND TIME. PH,LPN

## 2018-09-11 ENCOUNTER — TELEPHONE (OUTPATIENT)
Dept: CARDIOLOGY | Facility: CLINIC | Age: 73
End: 2018-09-11

## 2018-09-11 NOTE — TELEPHONE ENCOUNTER
PER DR. JONES D/EUGENIA LUCERO, MONITOR B/P AND H/R AND KEEP F/U APPT. HERE AT THE OFFICE. POLLY WITH [facility] AWARE AND AWARE HE IS TO CONTINUE 2 MG DAILY OF COUMADIN AND HAVE INR CHECKED ON Monday. VERBALIZED OK TO ALL ABOVE AND SHE IS GOING TO CALL MR. MALLORY AND LET HIM KNOW TO DC/ THE JAZMINE. GEREMIAS,LPN      ----- Message from Jesusita Nugent MA sent at 9/11/2018 11:07 AM EDT -----  Polly with [employer] called on patient Michael Mallory 1945. Patient states he is having some issues with bp. 92/57 pulse rate is 47. Patient is taking metoprolol and Cardizem. Patient wanting someone here to double check his medications. Call back for Polly is [phone]

## 2018-09-17 LAB — INR PPP: 2.2 (ref 2–3)

## 2018-09-18 ENCOUNTER — TELEPHONE (OUTPATIENT)
Dept: CARDIOLOGY | Facility: CLINIC | Age: 73
End: 2018-09-18

## 2018-09-18 ENCOUNTER — ANTICOAGULATION VISIT (OUTPATIENT)
Dept: CARDIOLOGY | Facility: CLINIC | Age: 73
End: 2018-09-18

## 2018-09-18 NOTE — TELEPHONE ENCOUNTER
NO DOSAGE CHANGE AND HAVE [facility] RECHECK LEVEL IN 1 WEEK PER CHRISTOPHER QUEVEDO. VERBAL ORDERS READ BACK AND VERIFIED BY CHRISTOPHER QUEVEDO. POLLY WITH [facility] AWARE, VERBALIZED OK. DAMARIS ARCHULETA        ----- Message from Jesusita Nugent MA sent at 9/17/2018  5:10 PM EDT -----  Zenaida from [facility] called to report the patient's INR that was drawn on 9/17/2018. Patient is on 2 mg daily. INR was 2.2 and PT is 25. Call back for any orders at [phone]

## 2018-09-18 NOTE — PROGRESS NOTES
NO DOSAGE CHANGE AND HAVE [facility]  RECHECK LEVEL IN 1 WEEK PER OMARI MCLAUGHLIN, PAC. VERBAL ORDERS READ BACK AND VERIFIED BY CHRISTOPHER QUEVEDO. POLLY WITH [facility] AWARE VERBALIZED OK. PH,LPN

## 2018-09-24 ENCOUNTER — ANTICOAGULATION VISIT (OUTPATIENT)
Dept: CARDIOLOGY | Facility: CLINIC | Age: 73
End: 2018-09-24

## 2018-09-24 ENCOUNTER — OFFICE VISIT (OUTPATIENT)
Dept: CARDIOLOGY | Facility: CLINIC | Age: 73
End: 2018-09-24

## 2018-09-24 VITALS
DIASTOLIC BLOOD PRESSURE: 87 MMHG | OXYGEN SATURATION: 99 % | SYSTOLIC BLOOD PRESSURE: 159 MMHG | HEIGHT: 68 IN | BODY MASS INDEX: 20.92 KG/M2 | HEART RATE: 47 BPM | WEIGHT: 138 LBS

## 2018-09-24 DIAGNOSIS — R01.1 HEART MURMUR: ICD-10-CM

## 2018-09-24 DIAGNOSIS — I10 ESSENTIAL HYPERTENSION: ICD-10-CM

## 2018-09-24 DIAGNOSIS — I34.0 MITRAL VALVE INSUFFICIENCY, UNSPECIFIED ETIOLOGY: ICD-10-CM

## 2018-09-24 DIAGNOSIS — Z98.890 H/O MITRAL VALVE REPAIR: ICD-10-CM

## 2018-09-24 DIAGNOSIS — I27.20 PULMONARY HYPERTENSION (HCC): ICD-10-CM

## 2018-09-24 DIAGNOSIS — R00.2 PALPITATIONS: ICD-10-CM

## 2018-09-24 DIAGNOSIS — I48.0 PAROXYSMAL ATRIAL FIBRILLATION (HCC): Primary | ICD-10-CM

## 2018-09-24 DIAGNOSIS — I05.9 MITRAL VALVE DISORDER: ICD-10-CM

## 2018-09-24 DIAGNOSIS — R06.02 SHORTNESS OF BREATH: ICD-10-CM

## 2018-09-24 LAB — INR PPP: 1.3 (ref 2–3)

## 2018-09-24 PROCEDURE — 99214 OFFICE O/P EST MOD 30 MIN: CPT | Performed by: INTERNAL MEDICINE

## 2018-09-24 RX ORDER — OXYCODONE HYDROCHLORIDE 5 MG/1
TABLET ORAL
COMMUNITY
Start: 2018-09-06 | End: 2018-10-22

## 2018-09-24 RX ORDER — METOPROLOL SUCCINATE 100 MG/1
100 TABLET, EXTENDED RELEASE ORAL DAILY
COMMUNITY
Start: 2018-09-06 | End: 2018-09-25 | Stop reason: SDUPTHER

## 2018-09-24 RX ORDER — WARFARIN SODIUM 2 MG/1
TABLET ORAL DAILY
COMMUNITY
Start: 2018-09-06 | End: 2018-09-25 | Stop reason: SDUPTHER

## 2018-09-24 RX ORDER — ENALAPRIL MALEATE 2.5 MG/1
2.5 TABLET ORAL DAILY
COMMUNITY
Start: 2018-09-07 | End: 2018-09-25 | Stop reason: SDUPTHER

## 2018-09-24 NOTE — PROGRESS NOTES
Subjective   Michael Mallory is a 73 y.o. male     Chief Complaint   Patient presents with   • Atrial Fibrillation     Here for f/u from [facility]   • Hypertension   • Palpitations   • Heart Murmur       PROBLEM LIST:       1. Mitral valve disorder   1.1 Echo 12/5/14 - EF 55-60%; DD II; mod mitral prolapse, severe MR; jet medially directed; mild TR; PA 40-45; mild to mod CA  1.2 Echo 9/18/16 - mild LVH; EF 55-60%; DD II; trace AR; prolapse of the posterior leaflet of mitral valve; prolapse of the P2 segment of the posterior mitral valve leaflet; severe MR; Mod TR; PA 40-45; mild CA  1.3 recent echocardiogram October 2017 demonstrated severe mitral regurgitation with pulmonary pressures estimated at 50-55 mmHg, moderate severe left atrial dilation and mild dilation of the left ventricle.  Moderate tricuspid regurgitation noted.  1.4 Mitral Valve Repair on 8-28-18 at [facility]  2. Palpitations/ extrasystoles  3. Atrial Fib  3.1 CHADS score 2  3.2 Left atrial appendage on 8-28-18 at [facility]              Specialty Problems        Cardiology Problems    Atrial fibrillation (CMS/HCC)        Hypertension        Mitral valve disorder        Palpitations        Heart murmur        Mitral valve insufficiency        Pulmonary hypertension                HPI:  Mr. Barba returns for follow-up after mitral valve repair done at [facility] last month.    Since last being seen here Michael developed 2 episodes of acute pulmonary congestion requiring IV diuretic therapy.  This was likely due to chordal rupture as initial echo at [facility] demonstrate ruptured cords with flail P2 cusp and confirmed severe mitral regurgitation.  He underwent placement of an annuloplasty ring and reconstruction of the chordal apparatus he had  the left atrial appendage, no Maze procedure was performed, and a liter of pleural fluid was drawn from each side in the perioperative period.  Postoperative course was  "unremarkable with persistent atrial fibrillation.    Since returning home Mr. Barba is gradually regained strength and stamina.  He feels that his heart is \"working more efficiently\" now despite the fact that he knows he is in predominantly atrial fibrillation.  He denies chest pain.  He has no orthopnea, PND, and only very mild lower extremity edema.  Alma is tolerating warfarin well and denies hemoptysis, hematemesis, melena, hematochezia, or hematuria.  He has had no symptoms of arterial embolic events.  His wife, who is a CBC was, brings in a blood pressure log her blood pressures are excellent controlled on current therapy with systolic blood pressures generally ranging between 101 120.              CURRENT MEDICATION:    Current Outpatient Prescriptions   Medication Sig Dispense Refill   • aspirin 81 MG EC tablet Take 81 mg by mouth Daily.     • enalapril (VASOTEC) 2.5 MG tablet Take 2.5 mg by mouth Daily.     • metoprolol succinate XL (TOPROL-XL) 100 MG 24 hr tablet Take 100 mg by mouth Daily.     • warfarin (COUMADIN) 2 MG tablet Daily.     • oxyCODONE (ROXICODONE) 5 MG immediate release tablet prn       No current facility-administered medications for this visit.        ALLERGIES:    Patient has no known allergies.    PAST MEDICAL HISTORY:    Past Medical History:   Diagnosis Date   • Anxiety    • Atrial fibrillation (CMS/HCC) 8/22/2016   • Hypertension 8/22/2016   • Mitral valve disorder    • Palpitations 8/22/2016       SURGICAL HISTORY:    Past Surgical History:   Procedure Laterality Date   • ATRIAL APPENDAGE EXCLUSION LEFT WITH TRANSESOPHAGEAL ECHOCARDIOGRAM     • HEMORRHOIDECTOMY     • HERNIA REPAIR     • MITRAL VALVE REPAIR/REPLACEMENT         SOCIAL HISTORY:    Social History     Social History   • Marital status:      Spouse name: N/A   • Number of children: N/A   • Years of education: N/A     Occupational History   • Not on file.     Social History Main Topics   • Smoking status: Never " "Smoker   • Smokeless tobacco: Never Used   • Alcohol use Yes      Comment: infrequent and very little   • Drug use: No   • Sexual activity: Not on file     Other Topics Concern   • Not on file     Social History Narrative   • No narrative on file       FAMILY HISTORY:    Family History   Problem Relation Age of Onset   • Heart attack Father        Review of Systems   Constitutional: Positive for fatigue (improving).   HENT: Negative.    Eyes: Positive for visual disturbance (glasses prn).   Respiratory: Negative.    Cardiovascular: Positive for leg swelling (mildly). Negative for chest pain and palpitations.   Gastrointestinal: Negative.  Negative for blood in stool (no melena,hematochezia,hematuria,hemoptysis).   Endocrine: Negative.    Genitourinary: Negative.    Musculoskeletal: Negative.    Skin: Negative.    Allergic/Immunologic: Negative.    Neurological: Negative.         Denies stroke like sx's   Hematological: Negative.    Psychiatric/Behavioral: Positive for sleep disturbance.       VISIT VITALS:  Vitals:    09/24/18 1013   BP: 159/87   BP Location: Left arm   Patient Position: Sitting   Pulse: (!) 47   SpO2: 99%   Weight: 62.6 kg (138 lb)   Height: 172.7 cm (67.99\")      /87 (BP Location: Left arm, Patient Position: Sitting)   Pulse (!) 47 Comment: per b/p machine  Ht 172.7 cm (67.99\")   Wt 62.6 kg (138 lb)   SpO2 99%   BMI 20.99 kg/m²     RECENT LABS:    Objective       Physical Exam   Constitutional: He is oriented to person, place, and time. He appears well-developed and well-nourished. No distress.   HENT:   Head: Normocephalic and atraumatic.   Eyes: Pupils are equal, round, and reactive to light. Conjunctivae and EOM are normal.   Neck: Normal range of motion. Neck supple. No JVD present. Carotid bruit is not present. No tracheal deviation present.   NL. Carotid upstrokes   Cardiovascular: Normal rate, normal heart sounds and intact distal pulses.  An irregular rhythm present.   Apical " rate approx. 100  Radial rate 80  No mitral regurg. Or stenosis noted   Pulmonary/Chest: Effort normal and breath sounds normal. He has no wheezes. He has no rhonchi. He has no rales.   NL. Exp. Phase  No dullness noted   Abdominal: Soft. Bowel sounds are normal. He exhibits no distension and no mass. There is no tenderness. There is no rebound and no guarding.   Musculoskeletal: Normal range of motion. He exhibits edema. He exhibits no tenderness or deformity.   LLE, 1+ edema to sock line, palpable pulses  RLE, trace edema , palpable pulses   Neurological: He is alert and oriented to person, place, and time.   Skin: Skin is warm and dry. No rash noted. No erythema. No pallor.   Psychiatric: He has a normal mood and affect. His behavior is normal. Judgment and thought content normal.   Nursing note and vitals reviewed.      Procedures      Assessment/Plan   #1.  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  Although heart rate is slightly suboptimal at this time, I'm concerned about increasing negative dromotropic therapy as I think the heart rate will slow myocardial edema and diastolic dysfunction improved.  Therefore, we will make no medication changes.    #2.  I have asked the patient to bring in his blood pressure cuff so we can ensure pressures are being recorded accurately.  The patient continues with persistent systemic hypertension we will adjust medications accordingly.    #3.  I encouraged Mr. Mallory to continue his efforts at exercise with gradually increasing levels until he feels he is at least that his prior baseline.  I also encouraged him not to limit his caloric or protein intake.    #4.  I would like to verify with [facility] at their recommendation that he be on long-term anticoagulation after 3 months of warfarin.  For this the case, I don't understand the rationale of left  atrial appendage exclusion.    #5.  We will perform transthoracic echocardiogram for a postsurgical baseline at this institution.    #6.  Mr. Mallory will follow-up with Dr. Jaimes as instructed by her office, and in our office in one month or on a when necessary basis for symptoms as discussed today.   Diagnosis Plan   1. Paroxysmal atrial fibrillation (CMS/HCC)     2. Essential hypertension     3. Mitral valve disorder     4. Palpitations     5. Heart murmur     6. Mitral valve insufficiency, unspecified etiology     7. Pulmonary hypertension     8. Shortness of breath         No Follow-up on file.              Patient's Body mass index is 20.99 kg/m². BMI is within normal parameters. No follow-up required.       Karolina Dang LPN    Scribed for Dr. Volodymyr Syed by Karolina Dang LPN September 24, 2018 10:47 AM         Electronically signed by:            This note is dictated utilizing voice recognition software.  Although this record has been proof read, transcriptional errors may still be present. If questions occur regarding the content of this record please do not hesitate to call our office.

## 2018-09-24 NOTE — PROGRESS NOTES
INR 1.3 CALLED TO OFFICE TODAY PER [facility] PER PATRICIA KRAFT. PER DR. JONES TAKE 4 MG X 3 DAYS THEN  CHANGE TO 3 MG DAILY AND RECHECK LEVEL ON FRIDAY. VERBAL ORDERS READ BACK AND VERIFIED BY DR. JONES. PATIENT AND WIFE AWARE, VERBALIZED OK. PH,LPN

## 2018-09-25 ENCOUNTER — TELEPHONE (OUTPATIENT)
Dept: CARDIOLOGY | Facility: CLINIC | Age: 73
End: 2018-09-25

## 2018-09-25 DIAGNOSIS — I05.9 MITRAL VALVE DISORDER: ICD-10-CM

## 2018-09-25 DIAGNOSIS — I10 ESSENTIAL HYPERTENSION: ICD-10-CM

## 2018-09-25 DIAGNOSIS — I48.0 PAROXYSMAL ATRIAL FIBRILLATION (HCC): Primary | ICD-10-CM

## 2018-09-25 RX ORDER — METOPROLOL SUCCINATE 100 MG/1
100 TABLET, EXTENDED RELEASE ORAL DAILY
Qty: 30 TABLET | Refills: 11 | Status: SHIPPED | OUTPATIENT
Start: 2018-09-25 | End: 2018-10-22 | Stop reason: ALTCHOICE

## 2018-09-25 RX ORDER — ENALAPRIL MALEATE 2.5 MG/1
2.5 TABLET ORAL DAILY
Qty: 30 TABLET | Refills: 11 | Status: SHIPPED | OUTPATIENT
Start: 2018-09-25 | End: 2018-11-28

## 2018-09-25 RX ORDER — WARFARIN SODIUM 3 MG/1
3 TABLET ORAL DAILY
Qty: 30 TABLET | Refills: 11 | Status: SHIPPED | OUTPATIENT
Start: 2018-09-25 | End: 2018-11-28

## 2018-09-25 NOTE — TELEPHONE ENCOUNTER
Patient's wife called stating the patient is needing refills on enalapril, warfarin, and metoprolol succ. I will send these to [facility] pharmacy in [place].

## 2018-09-28 ENCOUNTER — TELEPHONE (OUTPATIENT)
Dept: CARDIOLOGY | Facility: CLINIC | Age: 73
End: 2018-09-28

## 2018-09-28 LAB — INR PPP: 1.7 (ref 2–3)

## 2018-09-28 NOTE — TELEPHONE ENCOUNTER
Recieved a call from the lab with results on pt. Was informed that pt was on 4mg Coumadin Monday, Tuesday, and Wednesday, then dropped to 3mg. States his Protime was 20.5 and INR was 1.7. Was informed that pt needs to know how to proceed with coumadin, unsure of dose.     Spoke w/ DAMARIS Smith, whom stated she will find out plan.

## 2018-09-28 NOTE — TELEPHONE ENCOUNTER
Spoke with Shakir Williamson PA-C re: patient's recent PT/INR. Order received from Shakir for patient to take Coumadin 4 mg on Mon, Wed, Fri, and Sat, with 3 mg on all other days. Repeat INR in 1 week. Called number listed for patient and left a detailed message for him.

## 2018-10-01 ENCOUNTER — ANTICOAGULATION VISIT (OUTPATIENT)
Dept: CARDIOLOGY | Facility: CLINIC | Age: 73
End: 2018-10-01

## 2018-10-01 DIAGNOSIS — I48.91 ATRIAL FIBRILLATION, UNSPECIFIED TYPE (HCC): Primary | ICD-10-CM

## 2018-10-01 RX ORDER — WARFARIN SODIUM 4 MG/1
TABLET ORAL
Qty: 30 TABLET | Refills: 5 | Status: SHIPPED | OUTPATIENT
Start: 2018-10-01 | End: 2018-11-28

## 2018-10-01 NOTE — TELEPHONE ENCOUNTER
PATIENT AND WIFE HERE TO COMPARE OUR B/P MACHINE WITH THEIR HOME MONITOR.THERE WAS APPROX. 13 POINT SYSTOLIC DIFFERENCE IN OUR B/P MACHINE AND THEIR NEW MONITOR AND APPROX. 40 POINTS SYSTOLIC DIFFERENCE IN THEIR OLD MACHINE COMPARED TO OURS. REQUESTED 4 MG COUMAIDN SCRIPT TO THEIR PHARMACY, DONE. GEREMIAS,LPN

## 2018-10-01 NOTE — PROGRESS NOTES
INR WAS 1.7 ON Friday, 9-28-18, CHRISTOPHER QUEVEDO CHANGED DOSE TO 4 MG M/W/F/SAT AND 3 MG ALL OTHER DAY'S AND HAVE [facility] RECHECK LEVEL IN 1 WEEK. PER PATIENT'S CHART, TELEPHONE NOTE. GEREMIAS,LPN

## 2018-10-04 ENCOUNTER — TELEPHONE (OUTPATIENT)
Dept: CARDIOLOGY | Facility: CLINIC | Age: 73
End: 2018-10-04

## 2018-10-04 NOTE — TELEPHONE ENCOUNTER
VICKEY FROM [facility] NEEDING ORDER TO  HAVE INR DRAWN DOMINIQUE. STATES WIFE TOLD THEM IT WAS DUE DOMINIQUE. OK PER OMARI MCLAUGHLIN, PAC TO HAVE [facility] DRAW INR. VICKEY AWARE, AND TO TRY AND DRAW EARLY DOMINIQUE. SINCE OFFICE CLOSES AT NOON. VERBALIZED OK. PH,LPN

## 2018-10-05 ENCOUNTER — TELEPHONE (OUTPATIENT)
Dept: CARDIOLOGY | Facility: CLINIC | Age: 73
End: 2018-10-05

## 2018-10-05 ENCOUNTER — ANTICOAGULATION VISIT (OUTPATIENT)
Dept: CARDIOLOGY | Facility: CLINIC | Age: 73
End: 2018-10-05

## 2018-10-05 LAB — INR PPP: 2 (ref 2–3)

## 2018-10-05 NOTE — TELEPHONE ENCOUNTER
NO DOSAGE CHANGE AND HAVE [facility] RECHECK LEVEL IN 1 WEEK PER CHRISTOPHER QUEVEDO. VERBAL ORDERS READ BACK AND VERIFIED BY CHRISTOPHER QUEVEDO. VICKEY WITH [facility] AWARE VERBALIZED OK. DAMARIS ARCHULETA          ----- Message from Shanel Jackson sent at 10/5/2018  9:30 AM EDT -----   [facility] called to report Michael's PT-INR.     Pt on Coumadin 4mg x4 days and 3mg x3 days    Draw today was 2.0 INR, 24.2 PT

## 2018-10-05 NOTE — PROGRESS NOTES
NO DOSAGE CHANGE AND HAVE [facility] RECHECK LEVEL IN 1 WEEK PER OMARI MCLAUGHLIN PAC. VERBAL ORDERS READ BACK AND VERIFIED BY CHRISTOPHER QUEVEDO. VICKEY WITH [facility] AWARE VERBALIZED OK. PH,AGNESN

## 2018-10-12 LAB — INR PPP: 2.2 (ref 2–3)

## 2018-10-15 ENCOUNTER — ANTICOAGULATION VISIT (OUTPATIENT)
Dept: CARDIOLOGY | Facility: CLINIC | Age: 73
End: 2018-10-15

## 2018-10-15 NOTE — PROGRESS NOTES
MESSAGE LEFT FOR ME ON PIECE OF PAPER THAT INR WAS 2.2 PT 26 DRAWN Friday PER [facility]. PER NOTE JIM JOHNSON LPN SPOKE WITH SUSHIL AT [facility] TO LEAVE CURRENT DOSING AND RECHECK LEVEL IN 1-2 WEEKS.. GEREMIAS,LPN

## 2018-10-18 ENCOUNTER — HOSPITAL ENCOUNTER (OUTPATIENT)
Dept: CARDIOLOGY | Facility: HOSPITAL | Age: 73
Discharge: HOME | End: 2018-10-18
Attending: INTERNAL MEDICINE | Admitting: INTERNAL MEDICINE

## 2018-10-18 PROCEDURE — 93306 TTE W/DOPPLER COMPLETE: CPT

## 2018-10-22 ENCOUNTER — OFFICE VISIT (OUTPATIENT)
Dept: CARDIOLOGY | Facility: CLINIC | Age: 73
End: 2018-10-22

## 2018-10-22 ENCOUNTER — TELEPHONE (OUTPATIENT)
Dept: CARDIOLOGY | Facility: CLINIC | Age: 73
End: 2018-10-22

## 2018-10-22 VITALS
OXYGEN SATURATION: 93 % | WEIGHT: 139.6 LBS | BODY MASS INDEX: 21.16 KG/M2 | SYSTOLIC BLOOD PRESSURE: 162 MMHG | HEART RATE: 68 BPM | HEIGHT: 68 IN | DIASTOLIC BLOOD PRESSURE: 103 MMHG

## 2018-10-22 DIAGNOSIS — I48.0 PAROXYSMAL ATRIAL FIBRILLATION (HCC): ICD-10-CM

## 2018-10-22 DIAGNOSIS — R00.2 PALPITATIONS: ICD-10-CM

## 2018-10-22 DIAGNOSIS — Z98.890 H/O MITRAL VALVE REPAIR: ICD-10-CM

## 2018-10-22 DIAGNOSIS — I27.20 PULMONARY HYPERTENSION (HCC): Primary | ICD-10-CM

## 2018-10-22 DIAGNOSIS — I05.9 MITRAL VALVE DISORDER: ICD-10-CM

## 2018-10-22 DIAGNOSIS — R01.1 HEART MURMUR: ICD-10-CM

## 2018-10-22 DIAGNOSIS — I10 ESSENTIAL HYPERTENSION: ICD-10-CM

## 2018-10-22 DIAGNOSIS — R06.02 SHORTNESS OF BREATH: ICD-10-CM

## 2018-10-22 DIAGNOSIS — R93.1 DECREASED CARDIAC EJECTION FRACTION: ICD-10-CM

## 2018-10-22 DIAGNOSIS — I34.0 MITRAL VALVE INSUFFICIENCY, UNSPECIFIED ETIOLOGY: ICD-10-CM

## 2018-10-22 LAB
BH CV ECHO MEAS - ACS: 2.6 CM
BH CV ECHO MEAS - AO MEAN PG: 2.7 MMHG
BH CV ECHO MEAS - AO ROOT AREA (BSA CORRECTED): 2.3
BH CV ECHO MEAS - AO ROOT AREA: 12.6 CM^2
BH CV ECHO MEAS - AO ROOT DIAM: 4 CM
BH CV ECHO MEAS - AO V2 MEAN: 76.4 CM/SEC
BH CV ECHO MEAS - AO V2 VTI: 17.4 CM
BH CV ECHO MEAS - BSA(HAYCOCK): 1.7 M^2
BH CV ECHO MEAS - BSA: 1.7 M^2
BH CV ECHO MEAS - BZI_BMI: 21 KILOGRAMS/M^2
BH CV ECHO MEAS - BZI_METRIC_HEIGHT: 172.7 CM
BH CV ECHO MEAS - BZI_METRIC_WEIGHT: 62.6 KG
BH CV ECHO MEAS - EDV(CUBED): 189.7 ML
BH CV ECHO MEAS - EDV(MOD-SP4): 139 ML
BH CV ECHO MEAS - EDV(TEICH): 163 ML
BH CV ECHO MEAS - EF(CUBED): 31.6 %
BH CV ECHO MEAS - EF(MOD-SP4): 40.3 %
BH CV ECHO MEAS - EF(TEICH): 25.3 %
BH CV ECHO MEAS - ESV(CUBED): 129.8 ML
BH CV ECHO MEAS - ESV(MOD-SP4): 83 ML
BH CV ECHO MEAS - ESV(TEICH): 121.7 ML
BH CV ECHO MEAS - FS: 11.9 %
BH CV ECHO MEAS - IVS/LVPW: 0.96
BH CV ECHO MEAS - IVSD: 1.3 CM
BH CV ECHO MEAS - LA DIMENSION: 4.5 CM
BH CV ECHO MEAS - LA/AO: 1.1
BH CV ECHO MEAS - LV DIASTOLIC VOL/BSA (35-75): 79.6 ML/M^2
BH CV ECHO MEAS - LV IVRT: 0.11 SEC
BH CV ECHO MEAS - LV MASS(C)D: 332.6 GRAMS
BH CV ECHO MEAS - LV MASS(C)DI: 190.5 GRAMS/M^2
BH CV ECHO MEAS - LV SYSTOLIC VOL/BSA (12-30): 47.5 ML/M^2
BH CV ECHO MEAS - LVIDD: 5.7 CM
BH CV ECHO MEAS - LVIDS: 5.1 CM
BH CV ECHO MEAS - LVLD AP4: 7.1 CM
BH CV ECHO MEAS - LVLS AP4: 6.9 CM
BH CV ECHO MEAS - LVOT AREA (M): 4.2 CM^2
BH CV ECHO MEAS - LVOT AREA: 3.9 CM^2
BH CV ECHO MEAS - LVOT DIAM: 2.2 CM
BH CV ECHO MEAS - LVPWD: 1.3 CM
BH CV ECHO MEAS - MV DEC SLOPE: 352.3 CM/SEC^2
BH CV ECHO MEAS - MV E MAX VEL: 78 CM/SEC
BH CV ECHO MEAS - MV MEAN PG: 1.2 MMHG
BH CV ECHO MEAS - MV P1/2T MAX VEL: 76.9 CM/SEC
BH CV ECHO MEAS - MV P1/2T: 63.9 MSEC
BH CV ECHO MEAS - MV V2 MEAN: 50.9 CM/SEC
BH CV ECHO MEAS - MV V2 VTI: 16.9 CM
BH CV ECHO MEAS - MVA P1/2T LCG: 2.9 CM^2
BH CV ECHO MEAS - MVA(P1/2T): 3.4 CM^2
BH CV ECHO MEAS - RAP SYSTOLE: 10 MMHG
BH CV ECHO MEAS - RVDD: 2.7 CM
BH CV ECHO MEAS - RVSP: 25.7 MMHG
BH CV ECHO MEAS - SI(AO): 125.6 ML/M^2
BH CV ECHO MEAS - SI(CUBED): 34.3 ML/M^2
BH CV ECHO MEAS - SI(MOD-SP4): 32.1 ML/M^2
BH CV ECHO MEAS - SI(TEICH): 23.6 ML/M^2
BH CV ECHO MEAS - SV(AO): 219.3 ML
BH CV ECHO MEAS - SV(CUBED): 59.9 ML
BH CV ECHO MEAS - SV(MOD-SP4): 56 ML
BH CV ECHO MEAS - SV(TEICH): 41.3 ML
BH CV ECHO MEAS - TR MAX VEL: 198.2 CM/SEC
MAXIMAL PREDICTED HEART RATE: 147 BPM
STRESS TARGET HR: 125 BPM

## 2018-10-22 PROCEDURE — 99215 OFFICE O/P EST HI 40 MIN: CPT | Performed by: INTERNAL MEDICINE

## 2018-10-22 RX ORDER — UREA 10 %
1.5 LOTION (ML) TOPICAL NIGHTLY PRN
COMMUNITY
End: 2019-12-04

## 2018-10-22 RX ORDER — CARVEDILOL 25 MG/1
25 TABLET ORAL 2 TIMES DAILY
Qty: 60 TABLET | Refills: 6 | Status: SHIPPED | OUTPATIENT
Start: 2018-10-22 | End: 2019-01-29 | Stop reason: ALTCHOICE

## 2018-10-22 RX ORDER — AMIODARONE HYDROCHLORIDE 200 MG/1
200 TABLET ORAL TAKE AS DIRECTED
Qty: 90 TABLET | Refills: 3 | Status: SHIPPED | OUTPATIENT
Start: 2018-10-22 | End: 2019-09-05 | Stop reason: SDUPTHER

## 2018-10-22 NOTE — PROGRESS NOTES
Subjective   Michael Mallory is a 73 y.o. male     Chief Complaint   Patient presents with   • Follow-up     presents for 1 month echo f/u   • Atrial Fibrillation   • Palpitations   • Shortness of Breath   • Hypertension       PROBLEM LIST:   1. Mitral valve disorder   1.1 Echo 12/5/14 - EF 55-60%; DD II; mod mitral prolapse, severe MR; jet medially directed; mild TR; PA 40-45; mild to mod SD  1.2 Echo 9/18/16 - mild LVH; EF 55-60%; DD II; trace AR; prolapse of the posterior leaflet of mitral valve; prolapse of the P2 segment of the posterior mitral valve leaflet; severe MR; Mod TR; PA 40-45; mild SD  1.3 Recent echocardiogram October 2017 demonstrated severe mitral regurgitation with pulmonary pressures estimated at 50-55 mmHg, moderate severe left atrial dilation and mild dilation of the left ventricle.  Moderate tricuspid regurgitation noted.  1.4 Mitral Valve Repair on 8-28-18 at [facility]  1.5 Echo 10/18/18. EF 26-30%. Global hypokinesis. Left ventricular cavity severly dilated. Moderately reduced right ventricular systolic function. Moderate MR. Mild to moderate TR.    2. Palpitations/ extrasystoles  3. Atrial Fib  3.1 CHADS score 2  3.2 Left atrial appendage on 8-28-18 at [facility]      Specialty Problems        Cardiology Problems    Atrial fibrillation (CMS/HCC)        Hypertension        Mitral valve disorder        Palpitations        Heart murmur        Mitral valve insufficiency        Pulmonary hypertension (CMS/HCC)                HPI:  Nabeel Mallory returns for follow-up on echocardiogram.    He continues to exercise but frequently notices an increase in heart rate post exercise sometimes associated with shortness of breath and lightheadedness.  He has no chest pain, and he denies overt congestive heart failure symptoms, and he has no symptoms of endocarditis or arterial embolic events.    Echo demonstrated significant left ventricular dilation with a global ejection fraction estimated at  30+ or -5%.  There is moderate mitral regurgitation, grade 2 diastolic dysfunction, mildly increased aortic root dimensions, mild four-chamber enlargement, but no significant pulmonary hypertension.    I had an extended discussion with Mr. Mallory with nearly 45 minutes of face-to-face time.  We reviewed the report of his echocardiogram as well as reviewed the echocardiographic images.  We discussed the potential etiologies of left ventricular dilation and I reminded the patient that we have talked, for years, about the potential loss of LV function with correction of mitral regurgitation even with valve repair.  We also discussed in detail professor carr somewhat less than stellar compliance with medical therapy and his desire to minimize his exposure medications.        CURRENT MEDICATION:    Current Outpatient Prescriptions   Medication Sig Dispense Refill   • aspirin 81 MG EC tablet Take 81 mg by mouth Daily.     • enalapril (VASOTEC) 2.5 MG tablet Take 1 tablet by mouth Daily. 30 tablet 11   • melatonin 1 MG tablet Take 1.5 mg by mouth Every Night.     • metoprolol succinate XL (TOPROL-XL) 100 MG 24 hr tablet Take 1 tablet by mouth Daily. 30 tablet 11   • warfarin (COUMADIN) 3 MG tablet Take 1 tablet by mouth Daily. 30 tablet 11   • warfarin (COUMADIN) 4 MG tablet Taking 4 mg on Monday'S, WED'S, FRI'S, AND SAT'S AND 3 MG ALL OTHER DAYS 30 tablet 5     No current facility-administered medications for this visit.        ALLERGIES:    Patient has no known allergies.    PAST MEDICAL HISTORY:    Past Medical History:   Diagnosis Date   • Anxiety    • Atrial fibrillation (CMS/HCC) 8/22/2016   • Hypertension 8/22/2016   • Mitral valve disorder    • Palpitations 8/22/2016       SURGICAL HISTORY:    Past Surgical History:   Procedure Laterality Date   • ATRIAL APPENDAGE EXCLUSION LEFT WITH TRANSESOPHAGEAL ECHOCARDIOGRAM     • HEMORRHOIDECTOMY     • HERNIA REPAIR     • MITRAL VALVE REPAIR/REPLACEMENT         SOCIAL  "HISTORY:    Social History     Social History   • Marital status:      Spouse name: N/A   • Number of children: N/A   • Years of education: N/A     Occupational History   • Not on file.     Social History Main Topics   • Smoking status: Never Smoker   • Smokeless tobacco: Never Used   • Alcohol use Yes      Comment: infrequent and very little   • Drug use: No   • Sexual activity: Not on file     Other Topics Concern   • Not on file     Social History Narrative   • No narrative on file       FAMILY HISTORY:    Family History   Problem Relation Age of Onset   • Heart attack Father    • Transient ischemic attack Mother    • Cancer Mother        Review of Systems   Constitutional: Negative.  Negative for chills, fatigue and fever.   HENT: Negative.    Eyes: Positive for visual disturbance (Wears glasses prn).   Respiratory: Positive for shortness of breath (With increased activity and at rest).    Cardiovascular: Positive for palpitations. Negative for chest pain and leg swelling.   Gastrointestinal: Negative.  Negative for abdominal pain and blood in stool.   Endocrine: Negative for cold intolerance and heat intolerance.   Genitourinary: Negative for difficulty urinating and hematuria.   Musculoskeletal: Positive for back pain. Negative for arthralgias, myalgias and neck pain.   Skin: Negative.    Allergic/Immunologic: Negative.    Neurological: Negative.  Negative for dizziness, syncope, weakness, light-headedness and headaches.   Hematological: Negative.  Does not bruise/bleed easily.   Psychiatric/Behavioral: Negative for sleep disturbance (Denies waking at night SOA).       VISIT VITALS:  Vitals:    10/22/18 1023   BP: (!) 162/103   BP Location: Left arm   Patient Position: Sitting   Pulse: 68   SpO2: 93%   Weight: 63.3 kg (139 lb 9.6 oz)   Height: 172.7 cm (67.99\")      BP (!) 162/103 (BP Location: Left arm, Patient Position: Sitting)   Pulse 68   Ht 172.7 cm (67.99\")   Wt 63.3 kg (139 lb 9.6 oz)   SpO2 " "93%   BMI 21.23 kg/m²     RECENT LABS:    Objective       Physical Exam   Constitutional: He is oriented to person, place, and time. He appears well-developed and well-nourished.   HENT:   Head: Normocephalic and atraumatic.   Cardiovascular: An irregularly irregular rhythm present. Tachycardia present.    Apical rate noted to be 120-130   Pulmonary/Chest: Effort normal and breath sounds normal. No respiratory distress. He has no decreased breath sounds. He has no wheezes. He has no rhonchi. He has no rales.   Musculoskeletal: Normal range of motion. He exhibits no edema.   Neurological: He is alert and oriented to person, place, and time.   Skin: Skin is warm and dry.   Psychiatric: He has a normal mood and affect. His behavior is normal. Judgment and thought content normal.   Nursing note and vitals reviewed.      Procedures      Assessment/Plan   #1.  Severe LV systolic dysfunction post mitral valve repair for myxomatous mitral valve disease with severe mitral regurgitation.  Discharge summary from [facility] documented and ejection fraction of 45%.  I suspect that this was a somewhat optimistic assessment.  However, I doubt that ejection fraction was as severely impaired at that juncture as it is now.  Therefore, I believe that maximal concerted efforts at pharmacologic therapy and sudden cardiac death prophylaxis are indicated.    #2.  Atrial fibrillation could be a contributing factor to decreased LV systolic performance.  Therefore, I feel that attempts at maintaining a sinus mechanism need to be pursued as well.    #3.  In that regard, Mr. Mallory will hold enalapril for 3 days and we will start an trest oh 2426 twice a day.  Mr. Mallory has stopped taking medications in the past when he felt his blood pressure was \"too low\" and he felt tired.  I urged him to make every effort to try to tolerate medications as pharmacotherapy was is asked cancer improving LV systolic function.  I would also like to " change metoprolol to carvedilol 25 mg twice daily based on data suggesting and is somewhat more potent and improving ejection fraction.  We'll also start amiodarone loading at 200 mg twice daily for 1 week.    #4.  The patient was given precautions reference hypotension and/or bradycardia.  He was urged to continue his physical activity as long as he feels well enough to walk.    #5.  I would like to obtain a MUGA to assess LV systolic performance.  We discussed the need for external defibrillator in detail but Mr. Mallory would like to wait to evaluate EF by MUGA.  Although we will attempt to restore sinus mechanism after a week of amiodarone loading I don't think that waiting for that procedure to reassess EF is appropriate as it may be several weeks before she'll mechanical activity is restored.    #6.  Mr. Mallory will follow-up in one week for EKG.  If he remains in atrial fibrillation we will make arrangements for elective cardioversion will continue to pursue aggressive pharmacologic management of his dilated cardiomyopathy.  No diagnosis found.    No Follow-up on file.              Patient's Body mass index is 21.23 kg/m². BMI is within normal parameters. No follow-up required.       Sarah Garcia LPN        Electronically signed by:        Scribed for Dr. Volodymyr Syed by Sarah Garcia LPN October 22, 2018 10:52 AM     This note is dictated utilizing voice recognition software.  Although this record has been proof read, transcriptional errors may still be present. If questions occur regarding the content of this record please do not hesitate to call our office.

## 2018-10-22 NOTE — TELEPHONE ENCOUNTER
----- Message from Jesusita Nugent MA sent at 10/22/2018 10:36 AM EDT -----  Sarah: [facility] called stating the patient's dyastolic is running around . This morning his blood pressure has been 150/108. States he does have issues with anxiety. Nurse called stating that 2 separate occasions he has been walking on incline, has gotten short of breath so badly he has had to take a knee. Once better he resumed walking. Patient states he will discuss with Dr. Syed today but she wanted to call and report.       Pat: pt: 23.5          Inr: 2.7      Patient is in office this morning for scheduled appointment.

## 2018-10-22 NOTE — PATIENT INSTRUCTIONS
Hold Enalapril for 3 days prior to starting Entresto.   Monitor BP and maintain log, bring to follow up visits.  *desire Systolic BP to be 100-110*        Atrial Fibrillation  Atrial fibrillation is a type of heartbeat that is irregular or fast (rapid). If you have this condition, your heart keeps quivering in a weird (chaotic) way. This condition can make it so your heart cannot pump blood normally. Having this condition gives a person more risk for stroke, heart failure, and other heart problems. There are different types of atrial fibrillation. Talk with your doctor to learn about the type that you have.  Follow these instructions at home:  · Take over-the-counter and prescription medicines only as told by your doctor.  · If your doctor prescribed a blood-thinning medicine, take it exactly as told. Taking too much of it can cause bleeding. If you do not take enough of it, you will not have the protection that you need against stroke and other problems.  · Do not use any tobacco products. These include cigarettes, chewing tobacco, and e-cigarettes. If you need help quitting, ask your doctor.  · If you have apnea (obstructive sleep apnea), manage it as told by your doctor.  · Do not drink alcohol.  · Do not drink beverages that have caffeine. These include coffee, soda, and tea.  · Maintain a healthy weight. Do not use diet pills unless your doctor says they are safe for you. Diet pills may make heart problems worse.  · Follow diet instructions as told by your doctor.  · Exercise regularly as told by your doctor.  · Keep all follow-up visits as told by your doctor. This is important.  Contact a doctor if:  · You notice a change in the speed, rhythm, or strength of your heartbeat.  · You are taking a blood-thinning medicine and you notice more bruising.  · You get tired more easily when you move or exercise.  Get help right away if:  · You have pain in your chest or your belly (abdomen).  · You have sweating or  weakness.  · You feel sick to your stomach (nauseous).  · You notice blood in your throw up (vomit), poop (stool), or pee (urine).  · You are short of breath.  · You suddenly have swollen feet and ankles.  · You feel dizzy.  · Your suddenly get weak or numb in your face, arms, or legs, especially if it happens on one side of your body.  · You have trouble talking, trouble understanding, or both.  · Your face or your eyelid droops on one side.  These symptoms may be an emergency. Do not wait to see if the symptoms will go away. Get medical help right away. Call your local emergency services (911 in the [place] Do not drive yourself to the hospital.  This information is not intended to replace advice given to you by your health care provider. Make sure you discuss any questions you have with your health care provider.  Document Released: 09/26/2009 Document Revised: 05/25/2017 Document Reviewed: 04/13/2016  Elsevier Interactive Patient Education © 2018 [employer]

## 2018-10-29 ENCOUNTER — TELEPHONE (OUTPATIENT)
Dept: CARDIOLOGY | Facility: CLINIC | Age: 73
End: 2018-10-29

## 2018-10-29 ENCOUNTER — CLINICAL SUPPORT (OUTPATIENT)
Dept: CARDIOLOGY | Facility: CLINIC | Age: 73
End: 2018-10-29

## 2018-10-29 ENCOUNTER — ANTICOAGULATION VISIT (OUTPATIENT)
Dept: CARDIOLOGY | Facility: CLINIC | Age: 73
End: 2018-10-29

## 2018-10-29 DIAGNOSIS — R00.2 PALPITATIONS: ICD-10-CM

## 2018-10-29 DIAGNOSIS — I10 ESSENTIAL HYPERTENSION: Primary | ICD-10-CM

## 2018-10-29 DIAGNOSIS — I48.0 PAROXYSMAL ATRIAL FIBRILLATION (HCC): ICD-10-CM

## 2018-10-29 LAB — INR PPP: 3.4 (ref 2–3)

## 2018-10-29 PROCEDURE — 93000 ELECTROCARDIOGRAM COMPLETE: CPT | Performed by: INTERNAL MEDICINE

## 2018-10-29 NOTE — TELEPHONE ENCOUNTER
----- Message from Shanel Jackson sent at 10/29/2018  3:58 PM EDT -----  It looks like they just got it ready to call to schedule today, and they attempted to call him at 10:25am and stated they left a voicemail. They will make three attempts to contact a pt to schedule them before they send it back to me unsuccessful.     Scheduling is [phone] or [phone]    ----- Message -----  From: Maureen Roth  Sent: 10/29/2018  11:37 AM  To: Shanel Jackson    Pt had a MUGA ordered and never received a call to schedule, do you have a contact number?

## 2018-10-29 NOTE — PROGRESS NOTES
PER OMARI MCLAUGHLIN, PAC HOLD TODAY'S DOSE THEN RESUME PREVIOUS DOSING AND HAVE [facility] RECHECK LEVEL IN 1-2 WEEKS. VERBAL ORDERS READ BACK AND VERIFIED BY CHRISTOPHER QUEVEDO. OSMANY WITH [facility] VERBALIZED OK. PH,AGNESN

## 2018-11-05 LAB — INR PPP: 3.4 (ref 2–3)

## 2018-11-06 ENCOUNTER — ANTICOAGULATION VISIT (OUTPATIENT)
Dept: CARDIOLOGY | Facility: CLINIC | Age: 73
End: 2018-11-06

## 2018-11-06 NOTE — PROGRESS NOTES
INR 3.0 per [facility]. Order form from [facility] to be faxed and sent back to [facility] to resume dosing and recheck level in 1 week per CHRISTOPHER Reed. PH,LPN

## 2018-11-07 ENCOUNTER — TELEPHONE (OUTPATIENT)
Dept: CARDIOLOGY | Facility: CLINIC | Age: 73
End: 2018-11-07

## 2018-11-07 NOTE — TELEPHONE ENCOUNTER
----- Message from Jesusita Nugent MA sent at 11/5/2018  2:49 PM EST -----  Patient called stating that he needs a statement so he can be off work for 3 months. He states he has done family medical leave but that is up 11/26/18. He is wanting to know if Dr. Syed can write for him to be off 3 more months.      He also has a INR of 3.0. Patient is to stay on same medications and recheck in 1 week.     Spoke with Dr. Syed re: additional request for time off. He states that he feels that this should come from Cardiothoracic Surgeon. Patient is made aware of this and he verbalized understanding.

## 2018-11-12 ENCOUNTER — HOSPITAL ENCOUNTER (OUTPATIENT)
Dept: CARDIOLOGY | Facility: HOSPITAL | Age: 73
Discharge: HOME | End: 2018-11-12
Attending: INTERNAL MEDICINE

## 2018-11-12 DIAGNOSIS — R93.1 DECREASED CARDIAC EJECTION FRACTION: ICD-10-CM

## 2018-11-12 DIAGNOSIS — R06.02 SHORTNESS OF BREATH: ICD-10-CM

## 2018-11-12 DIAGNOSIS — I10 ESSENTIAL HYPERTENSION: ICD-10-CM

## 2018-11-12 DIAGNOSIS — I05.9 MITRAL VALVE DISORDER: ICD-10-CM

## 2018-11-12 DIAGNOSIS — I34.0 MITRAL VALVE INSUFFICIENCY, UNSPECIFIED ETIOLOGY: ICD-10-CM

## 2018-11-12 DIAGNOSIS — Z98.890 H/O MITRAL VALVE REPAIR: ICD-10-CM

## 2018-11-12 DIAGNOSIS — I48.0 PAROXYSMAL ATRIAL FIBRILLATION (HCC): ICD-10-CM

## 2018-11-12 PROCEDURE — 78472 GATED HEART PLANAR SINGLE: CPT

## 2018-11-12 PROCEDURE — 0 TECHNETIUM LABELED RED BLOOD CELLS: Performed by: INTERNAL MEDICINE

## 2018-11-12 PROCEDURE — 78472 GATED HEART PLANAR SINGLE: CPT | Performed by: INTERNAL MEDICINE

## 2018-11-12 PROCEDURE — A9560 TC99M LABELED RBC: HCPCS | Performed by: INTERNAL MEDICINE

## 2018-11-12 RX ADMIN — TECHNETIUM TC 99M-LABELED RED BLOOD CELLS 1 DOSE: KIT at 15:48

## 2018-11-12 RX ADMIN — HEPARIN SODIUM (PORCINE) LOCK FLUSH IV SOLN 100 UNIT/ML 10 UNITS: 100 SOLUTION at 15:48

## 2018-11-13 LAB — INR PPP: 3.9 (ref 2–3)

## 2018-11-14 LAB
MAXIMAL PREDICTED HEART RATE: 147 BPM
STRESS TARGET HR: 125 BPM

## 2018-11-15 ENCOUNTER — ANTICOAGULATION VISIT (OUTPATIENT)
Dept: CARDIOLOGY | Facility: CLINIC | Age: 73
End: 2018-11-15

## 2018-11-15 NOTE — PROGRESS NOTES
PER OMARI MCLAUGHLIN, PAC HOLD X 1 DAY THEN CHANGE TO 4 MG F'S AND 3 MG ALL OTHER DAY'S  AND HAVE [facility] RECHECK LEVEL IN 1 WEEK. VERBAL ORDERS READ BACK AND VERIFIED BY CHRISTOPHER QUEVEDO. PATIENT AWARE, VERBALIZED OK. PH,LPN

## 2018-11-19 ENCOUNTER — TELEPHONE (OUTPATIENT)
Dept: CARDIOLOGY | Facility: CLINIC | Age: 73
End: 2018-11-19

## 2018-11-19 ENCOUNTER — ANTICOAGULATION VISIT (OUTPATIENT)
Dept: CARDIOLOGY | Facility: CLINIC | Age: 73
End: 2018-11-19

## 2018-11-19 LAB — INR PPP: 4 (ref 2–3)

## 2018-11-19 NOTE — TELEPHONE ENCOUNTER
PER CHRISTOPHER QUEVEDO HOLD TODAY'S DOSE, ONLY TAKE 1.5 MG DOMINIQUE. THEN RESUME PREVIOUS DOSING AND HAVE [facility] RECHECK LEVEL IN 1 WEEK. VERBAL ORDERS READ BACK AND VERIFIED BY CHRISTOPHER QUEVEDO. POLLY AWARE, VERBALIZED OK. DAMARIS ARCHULETA        ----- Message from Jesusita Nugent MA sent at 11/19/2018  1:29 PM EST -----  oPlly from [facility] called [phone] with Inr results 4.0. Please call in needed with further instructions.

## 2018-11-19 NOTE — PROGRESS NOTES
PER OMARI MCLAUGHLIN, PAC HOLD TODAY'S DOSE, ONLY TAKE 1.5 MG DOMINIQUE. THEN RESUME PREVIOUS DOSING AND HAVE [facility] RECHECK LEVEL IN 1 WEEK. VERBAL ORDERS READ BACK AND VERIFIED BY CHRISTOPHER QUEVEDO. POLLY WILLARD, VERBALIZED OK. PH,LPN

## 2018-11-20 ENCOUNTER — TELEPHONE (OUTPATIENT)
Dept: CARDIOLOGY | Facility: CLINIC | Age: 73
End: 2018-11-20

## 2018-11-20 NOTE — TELEPHONE ENCOUNTER
PATIENT HAD A NOSEBLEED YEST, AND ANOTHER ONE TODAY, ONLY OUT OF 1 NARE. HE HELD YEST'S COUMADIN DOSE AND PER OMARI MCLAUGHLIN, PAC ONLY TAKE 1.5 MG TODAY. PATIENT AWARE AND VERBALIZED HE UNDERSTOOD. TO CALL THE OFFICE WITH ANY MORE PROBLEMS. DAMARIS ARCHULETA      ----- Message from Elizabet Brady MA sent at 11/20/2018  9:29 AM EST -----  Patient called stating he woke up with a significant nose bleed. He isn't sure if he should take his Coumadin or hold it today.

## 2018-11-26 ENCOUNTER — ANTICOAGULATION VISIT (OUTPATIENT)
Dept: CARDIOLOGY | Facility: CLINIC | Age: 73
End: 2018-11-26

## 2018-11-26 ENCOUNTER — TELEPHONE (OUTPATIENT)
Dept: CARDIOLOGY | Facility: CLINIC | Age: 73
End: 2018-11-26

## 2018-11-26 LAB — INR PPP: 3.1 (ref 2–3)

## 2018-11-26 NOTE — PROGRESS NOTES
NO DOSAGE CHANGE AND HAVE [facility] RECHECK LEVEL IN 1 WEEK PER OMARI MCLAUGHLIN, PAC. VERBAL ORDERS READ BACK AND VERIFIED BY CHRISTOPHER QUEVEDO. YULIA WITH [facility] AWARE VERBALIZED OK. PH,AGNESN

## 2018-11-26 NOTE — TELEPHONE ENCOUNTER
NO DOSAGE CHANGE ANDHAVE [facility] RECHECK LEVEL IN 1 WEEK PER CHRISTOPHER QUEVEDO. VERBAL ORDERS READ BACK AND VERIFIED BY CHRISTOPHER QUEVEDO. YULIA WITH [facility] AWARE VERBALIZED OK. DAMARIS ARCHULETA              ----- Message from Shanel Jackson sent at 11/26/2018 11:00 AM EST -----   [facility] reports pt's pt-inr for today as:    PT- 37.8 and INR- 3.1    Reports he has ate greeny leafy vegetables throughout the week. He reports to them that he had significant nosebleeds on Tuesday and Wednesday. [phone]

## 2018-11-27 ENCOUNTER — OUTSIDE FACILITY SERVICE (OUTPATIENT)
Dept: CARDIOLOGY | Facility: CLINIC | Age: 73
End: 2018-11-27

## 2018-11-28 ENCOUNTER — OFFICE VISIT (OUTPATIENT)
Dept: CARDIOLOGY | Facility: CLINIC | Age: 73
End: 2018-11-28

## 2018-11-28 ENCOUNTER — TELEPHONE (OUTPATIENT)
Dept: CARDIOLOGY | Facility: CLINIC | Age: 73
End: 2018-11-28

## 2018-11-28 ENCOUNTER — ANTICOAGULATION VISIT (OUTPATIENT)
Dept: CARDIOLOGY | Facility: CLINIC | Age: 73
End: 2018-11-28

## 2018-11-28 VITALS
HEIGHT: 68 IN | DIASTOLIC BLOOD PRESSURE: 88 MMHG | WEIGHT: 143.6 LBS | BODY MASS INDEX: 21.76 KG/M2 | HEART RATE: 55 BPM | OXYGEN SATURATION: 99 % | SYSTOLIC BLOOD PRESSURE: 162 MMHG

## 2018-11-28 DIAGNOSIS — R01.1 HEART MURMUR: ICD-10-CM

## 2018-11-28 DIAGNOSIS — I10 ESSENTIAL HYPERTENSION: ICD-10-CM

## 2018-11-28 DIAGNOSIS — I48.0 PAROXYSMAL ATRIAL FIBRILLATION (HCC): Primary | ICD-10-CM

## 2018-11-28 DIAGNOSIS — Z98.890 H/O MITRAL VALVE REPAIR: ICD-10-CM

## 2018-11-28 DIAGNOSIS — I27.20 PULMONARY HYPERTENSION (HCC): ICD-10-CM

## 2018-11-28 DIAGNOSIS — R06.02 SHORTNESS OF BREATH: ICD-10-CM

## 2018-11-28 DIAGNOSIS — I05.9 MITRAL VALVE DISORDER: ICD-10-CM

## 2018-11-28 DIAGNOSIS — I34.0 MITRAL VALVE INSUFFICIENCY, UNSPECIFIED ETIOLOGY: ICD-10-CM

## 2018-11-28 DIAGNOSIS — R00.2 PALPITATIONS: ICD-10-CM

## 2018-11-28 PROCEDURE — 99214 OFFICE O/P EST MOD 30 MIN: CPT | Performed by: INTERNAL MEDICINE

## 2018-11-28 RX ORDER — ENALAPRIL MALEATE 10 MG/1
10 TABLET ORAL DAILY
Qty: 30 TABLET | Refills: 5 | Status: SHIPPED | OUTPATIENT
Start: 2018-11-28 | End: 2018-12-03 | Stop reason: SDUPTHER

## 2018-11-28 NOTE — TELEPHONE ENCOUNTER
PATIENT CALLING TO MAKE SURE HE IS TO GO AHEAD AND START THE VASOTEC RIGHT AFTER HE STOPS THE ENTRESTO. TOLD HIM DR. SYED DID NOT MENTION OTHERWISE AT TODAY'S APPT. DAMARIS ARCHLUETA        ----- Message from Alma Valentino MA sent at 11/28/2018  3:59 PM EST -----      ----- Message -----  From: Shanel Jackson  Sent: 11/28/2018   2:29 PM  To: MgRadha Syed [facility]     Michael called with questions regarding a prescription. He can be reached at [phone]

## 2018-11-29 ENCOUNTER — TELEPHONE (OUTPATIENT)
Dept: CARDIOLOGY | Facility: CLINIC | Age: 73
End: 2018-11-29

## 2018-11-29 ENCOUNTER — OUTSIDE FACILITY SERVICE (OUTPATIENT)
Dept: CARDIOLOGY | Facility: CLINIC | Age: 73
End: 2018-11-29

## 2018-11-29 NOTE — TELEPHONE ENCOUNTER
POLLY AT [facility] AWARE COUMADIN WAS DC'D YEST. BY DR. JONES. STATES THEY WILL D/C CARE WITH PATIENT ON Monday. GEREMIAS,LPN          ----- Message from Maureen Roth sent at 11/28/2018  4:10 PM EST -----  Received message from [employer] re: pt. Per message in notebook, they want to know if pt can D/C coumadin.     [phone]

## 2018-12-03 ENCOUNTER — TELEPHONE (OUTPATIENT)
Dept: CARDIOLOGY | Facility: CLINIC | Age: 73
End: 2018-12-03

## 2018-12-03 DIAGNOSIS — I10 ESSENTIAL HYPERTENSION: ICD-10-CM

## 2018-12-03 RX ORDER — ENALAPRIL MALEATE 20 MG/1
20 TABLET ORAL DAILY
Qty: 30 TABLET | Refills: 5 | Status: SHIPPED | OUTPATIENT
Start: 2018-12-03 | End: 2019-01-29

## 2018-12-03 NOTE — TELEPHONE ENCOUNTER
Patient states b/p today was 166/104. States wife works at [employer] and spoke with Dr. Syed today and ok to increase enalapril to 20 mg po daily, which was what  originally recommended. Ok per Dr. Syed. New updated script sent in to [facility] pharmacy. PH,LPN

## 2018-12-06 ENCOUNTER — TELEPHONE (OUTPATIENT)
Dept: CARDIOLOGY | Facility: CLINIC | Age: 73
End: 2018-12-06

## 2018-12-06 DIAGNOSIS — I10 ESSENTIAL HYPERTENSION: Primary | ICD-10-CM

## 2018-12-06 RX ORDER — CLONIDINE HYDROCHLORIDE 0.2 MG/1
0.2 TABLET ORAL 3 TIMES DAILY PRN
Qty: 90 TABLET | Refills: 3 | Status: SHIPPED | OUTPATIENT
Start: 2018-12-06 | End: 2019-01-29 | Stop reason: SDUPTHER

## 2018-12-06 NOTE — TELEPHONE ENCOUNTER
WIFE HAD CALLED DR. JONES YEST. REGARDING  AND B/P. V/O RECEIVED TO SEND IN CLONIDINE 0.2 MG PO TID PRN FOR B/P > 160/90, MONITOR B/P AND H/R. CLONIDINE SENT IN TO [facility] PER PATIENT REQUEST. DAMARIS ARCHULETA        ----- Message from Jesusita Nugent MA sent at 12/6/2018  2:53 PM EST -----  Patient called stating that he was told clonidine was going to be send to his pharmacy. Do you want me to send this in for him?

## 2018-12-18 ENCOUNTER — TELEPHONE (OUTPATIENT)
Dept: CARDIOLOGY | Facility: CLINIC | Age: 73
End: 2018-12-18

## 2018-12-18 DIAGNOSIS — I42.9 CARDIOMYOPATHY, UNSPECIFIED TYPE (HCC): Primary | ICD-10-CM

## 2018-12-18 NOTE — TELEPHONE ENCOUNTER
WIFE CALLED NEEDING REFILLS OF ENTRESTO 24-26 SENT IN TO [email or web address]. WIFE WORKS AT [employer] AS A NURSE AND DISCUSSED RE-STARTING ENTRESTO 24-26.  V/O PER DR. JONES TO RESTART ENTRESTO 24-26 PO BID. REFILLS SENT IN TO [email or web address] AS REQUESTED. PH,LPN

## 2019-01-29 ENCOUNTER — OFFICE VISIT (OUTPATIENT)
Dept: CARDIOLOGY | Facility: CLINIC | Age: 74
End: 2019-01-29

## 2019-01-29 VITALS
SYSTOLIC BLOOD PRESSURE: 166 MMHG | DIASTOLIC BLOOD PRESSURE: 92 MMHG | OXYGEN SATURATION: 100 % | BODY MASS INDEX: 22.55 KG/M2 | HEIGHT: 68 IN | HEART RATE: 51 BPM | WEIGHT: 148.8 LBS

## 2019-01-29 DIAGNOSIS — I05.9 MITRAL VALVE DISORDER: ICD-10-CM

## 2019-01-29 DIAGNOSIS — Z98.890 H/O MITRAL VALVE REPAIR: ICD-10-CM

## 2019-01-29 DIAGNOSIS — R06.02 SHORTNESS OF BREATH: ICD-10-CM

## 2019-01-29 DIAGNOSIS — I27.20 PULMONARY HYPERTENSION (HCC): ICD-10-CM

## 2019-01-29 DIAGNOSIS — I48.0 PAROXYSMAL ATRIAL FIBRILLATION (HCC): Primary | ICD-10-CM

## 2019-01-29 DIAGNOSIS — I10 ESSENTIAL HYPERTENSION: ICD-10-CM

## 2019-01-29 DIAGNOSIS — I42.9 CARDIOMYOPATHY, UNSPECIFIED TYPE (HCC): ICD-10-CM

## 2019-01-29 DIAGNOSIS — R01.1 HEART MURMUR: ICD-10-CM

## 2019-01-29 DIAGNOSIS — R00.2 PALPITATIONS: ICD-10-CM

## 2019-01-29 DIAGNOSIS — I34.0 MITRAL VALVE INSUFFICIENCY, UNSPECIFIED ETIOLOGY: ICD-10-CM

## 2019-01-29 PROCEDURE — 99214 OFFICE O/P EST MOD 30 MIN: CPT | Performed by: INTERNAL MEDICINE

## 2019-01-29 RX ORDER — CLONIDINE HYDROCHLORIDE 0.1 MG/1
0.1 TABLET ORAL 3 TIMES DAILY PRN
Qty: 90 TABLET | Refills: 5 | Status: SHIPPED | OUTPATIENT
Start: 2019-01-29 | End: 2019-05-28

## 2019-01-29 RX ORDER — ENALAPRIL MALEATE 10 MG/1
10 TABLET ORAL 2 TIMES DAILY
Qty: 60 TABLET | Refills: 11 | Status: SHIPPED | OUTPATIENT
Start: 2019-01-29 | End: 2019-05-28

## 2019-01-29 RX ORDER — METOPROLOL TARTRATE 50 MG/1
50 TABLET, FILM COATED ORAL 2 TIMES DAILY
Qty: 60 TABLET | Refills: 11 | OUTPATIENT
Start: 2019-01-29 | End: 2019-02-22

## 2019-01-29 NOTE — PROGRESS NOTES
Subjective   Michael Mallory is a 73 y.o. male     Chief Complaint   Patient presents with   • Atrial Fibrillation     Here for 2 mo. f/u   • Hypertension   • Heart Murmur   • Palpitations       PROBLEM LIST:       1. Mitral valve disorder   1.1 Echo 12/5/14 - EF 55-60%; DD II; mod mitral prolapse, severe MR; jet medially directed; mild TR; PA 40-45; mild to mod CT  1.2 Echo 9/18/16 - mild LVH; EF 55-60%; DD II; trace AR; prolapse of the posterior leaflet of mitral valve; prolapse of the P2 segment of the posterior mitral valve leaflet; severe MR; Mod TR; PA 40-45; mild CT  1.3 Recent echocardiogram October 2017 demonstrated severe mitral regurgitation with pulmonary pressures estimated at 50-55 mmHg, moderate severe left atrial dilation and mild dilation of the left ventricle.  Moderate tricuspid regurgitation noted.  1.4 Mitral Valve Repair on 8-28-18 at [facility]  1.5 Echo 10/18/18. EF 26-30%. Global hypokinesis. Left ventricular cavity severly dilated. Moderately reduced right ventricular systolic function. Moderate MR. Mild to moderate TR.    1.6 MUGA 11-12-18 EF 23%  2. Palpitations/ extrasystoles  3. Atrial Fib  3.1 CHADS score 2  3.2 Left atrial appendage on 8-28-18 at [facility]            Specialty Problems        Cardiology Problems    Atrial fibrillation (CMS/HCC)        Hypertension        Mitral valve disorder        Palpitations        Heart murmur        Mitral valve insufficiency        Pulmonary hypertension (CMS/HCC)                HPI:  Mr. Barba returns for follow-up status post mitral valve repair with nonischemic dilated cardiomyopathy, history of CHF, atrial fibrillation,and long-standing systemic hypertension.    He continues to feel well.  He walks 15-20 minutes twice daily and has no shortness of breath.  An incline that caused him to feel shortness of breath previously no longer is bothersome.  He denies any type of anginal chest discomfort and has no orthopnea, PND, or lower  extremity edema.  Mr. Barba has rare short-lived palpitations that do not cause pain, shortness of breath, or dizziness.  He has not been presyncopal or syncopal.  She brings in a blood pressure log today that shows systolic blood pressures in the 130s to 150s at home.    As per norm, we hadn't extended discussion today about the pathophysiology of the patient's cardiomyopathy, treatment of sudden cardiac death, pharmacologic treatment of dilated cardiomyopathy, alternative forms of therapy etc.  The patient still does not wish to consider defibrillator implantation despite knowledge of the fact that his adequate exercise capacity and lack of current symptoms does not predict the absence of sudden cardiac death.                CURRENT MEDICATION:    Current Outpatient Medications   Medication Sig Dispense Refill   • amiodarone (PACERONE) 200 MG tablet Take 1 tablet by mouth Take As Directed. 1 tab by mouth BID for 1 week then 200 mg by mouth daily 90 tablet 3   • aspirin 81 MG EC tablet Take 81 mg by mouth Daily.     • carvedilol (COREG) 25 MG tablet Take 1 tablet by mouth 2 (Two) Times a Day. 60 tablet 6   • CloNIDine (CATAPRES) 0.2 MG tablet Take 1 tablet by mouth 3 (Three) Times a Day As Needed for High Blood Pressure. > 160/90 90 tablet 3   • melatonin 1 MG tablet Take 1.5 mg by mouth Every Night.     • sacubitril-valsartan (ENTRESTO) 24-26 MG tablet Take 1 tablet by mouth 2 (Two) Times a Day. (Patient taking differently: Take 0.5 tablets by mouth 2 (Two) Times a Day.) 60 tablet 11     No current facility-administered medications for this visit.        ALLERGIES:    Patient has no known allergies.    PAST MEDICAL HISTORY:    Past Medical History:   Diagnosis Date   • Anxiety    • Atrial fibrillation (CMS/HCC) 8/22/2016   • Hypertension 8/22/2016   • Mitral valve disorder    • Palpitations 8/22/2016       SURGICAL HISTORY:    Past Surgical History:   Procedure Laterality Date   • ATRIAL APPENDAGE EXCLUSION LEFT  "WITH TRANSESOPHAGEAL ECHOCARDIOGRAM     • HEMORRHOIDECTOMY     • HERNIA REPAIR     • MITRAL VALVE REPAIR/REPLACEMENT         SOCIAL HISTORY:    Social History     Socioeconomic History   • Marital status:      Spouse name: Not on file   • Number of children: Not on file   • Years of education: Not on file   • Highest education level: Not on file   Social Needs   • Financial resource strain: Not on file   • Food insecurity - worry: Not on file   • Food insecurity - inability: Not on file   • Transportation needs - medical: Not on file   • Transportation needs - non-medical: Not on file   Occupational History   • Not on file   Tobacco Use   • Smoking status: Never Smoker   • Smokeless tobacco: Never Used   Substance and Sexual Activity   • Alcohol use: Yes     Comment: infrequent and very little   • Drug use: No   • Sexual activity: Not on file   Other Topics Concern   • Not on file   Social History Narrative   • Not on file       FAMILY HISTORY:    Family History   Problem Relation Age of Onset   • Heart attack Father    • Transient ischemic attack Mother    • Cancer Mother        Review of Systems   Constitutional: Negative.    HENT: Negative.    Eyes: Positive for visual disturbance (glasses prn).   Respiratory: Negative.    Cardiovascular: Positive for palpitations (x1 epsiode over a 3 day span). Negative for chest pain and leg swelling.   Gastrointestinal: Negative.    Endocrine: Negative.    Genitourinary: Negative.    Musculoskeletal: Negative.    Skin: Negative.    Allergic/Immunologic: Negative.    Neurological: Negative.    Hematological: Negative.    Psychiatric/Behavioral: Negative.        VISIT VITALS:  Vitals:    01/29/19 1045   BP: 166/92   BP Location: Left arm   Patient Position: Sitting   Pulse: 51   SpO2: 100%   Weight: 67.5 kg (148 lb 12.8 oz)   Height: 172.7 cm (67.99\")      /92 (BP Location: Left arm, Patient Position: Sitting)   Pulse 51   Ht 172.7 cm (67.99\")   Wt 67.5 kg (148 " lb 12.8 oz)   SpO2 100%   BMI 22.63 kg/m²     RECENT LABS:    Objective       Physical Exam   Constitutional: He is oriented to person, place, and time. He appears well-developed and well-nourished. No distress.   HENT:   Head: Normocephalic and atraumatic.   Eyes: Conjunctivae and EOM are normal. Pupils are equal, round, and reactive to light.   Neck: Normal range of motion. Neck supple. No hepatojugular reflux and no JVD present. Carotid bruit is not present. No tracheal deviation present.   NL. Carotid upstrokes     Cardiovascular: Normal rate, regular rhythm, S1 normal, S2 normal and intact distal pulses. Exam reveals gallop, S3 (soft) and S4 (loud).   No murmur heard.   No systolic murmur is present.  Pulses:       Radial pulses are 2+ on the right side, and 2+ on the left side.   Pulmonary/Chest: Effort normal and breath sounds normal. He has no wheezes. He has no rhonchi. He has no rales.   NL. Expir. Phase  NL. Breath sound intensity     Abdominal: Soft. Bowel sounds are normal. He exhibits no distension, no abdominal bruit and no mass. There is no tenderness. There is no rebound and no guarding.   No organomegaly   Musculoskeletal: Normal range of motion. He exhibits no edema, tenderness or deformity.   BLE, No edema     Neurological: He is alert and oriented to person, place, and time.   Skin: Skin is warm and dry. No rash noted. No erythema. No pallor.   Psychiatric: He has a normal mood and affect. His behavior is normal. Judgment and thought content normal.   Nursing note and vitals reviewed.      Procedures      Assessment/Plan   #1.  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  Ejection fraction was 24% by MUGA despite appropriate pharmacotherapy.  The patient does not wish to institute her AICD placement.    #2.  With regard to medications, Mr. Mallory is convinced that he would do better on metoprolol (as compared to carvedilol and enalapril as opposed to Entresto).   At the patient's request we will make those changes.  He will hold Entresto for 3 days and then start enalapril 10 mg twice daily.  We will change from carvedilol 25 mg twice daily to metoprolol 50 mg twice daily.  Because of the patient's persistent pathology with found dilated cardiomyopathy, severe left atrial enlargement, I recommended continuing amiodarone.  Mr. Mallory is accepting of that recommendation.    #3.  Mr. Mallory will keep a careful watch on heart rate and blood pressures.  He will continue other medications as well as efforts at lifestyle modification.    #4.  The patient will follow-up with Dr. Jaimes per her instructions, and in our office in 6 months or on a when necessary basis for medication intolerance as discussed in detail today.   Diagnosis Plan   1. Paroxysmal atrial fibrillation (CMS/HCC)     2. Heart murmur     3. Essential hypertension     4. Mitral valve disorder     5. Mitral valve insufficiency, unspecified etiology     6. Palpitations     7. Pulmonary hypertension (CMS/HCC)     8. Shortness of breath     9. H/O mitral valve repair         No Follow-up on file.              Patient's Body mass index is 22.63 kg/m². BMI is within normal parameters. No follow-up required..       Karolina Dang LPN    Scribed for Dr. Volodymyr Syed by Karolina Dang LPN January 29, 2019 10:49 AM         Electronically signed by:            This note is dictated utilizing voice recognition software.  Although this record has been proof read, transcriptional errors may still be present. If questions occur regarding the content of this record please do not hesitate to call our office.

## 2019-02-13 ENCOUNTER — TELEPHONE (OUTPATIENT)
Dept: CARDIOLOGY | Facility: CLINIC | Age: 74
End: 2019-02-13

## 2019-02-22 ENCOUNTER — APPOINTMENT (OUTPATIENT)
Dept: GENERAL RADIOLOGY | Facility: HOSPITAL | Age: 74
End: 2019-02-22

## 2019-02-22 ENCOUNTER — HOSPITAL ENCOUNTER (EMERGENCY)
Facility: HOSPITAL | Age: 74
Discharge: HOME | End: 2019-02-22
Attending: EMERGENCY MEDICINE | Admitting: EMERGENCY MEDICINE

## 2019-02-22 VITALS
TEMPERATURE: 98.4 F | RESPIRATION RATE: 18 BRPM | OXYGEN SATURATION: 96 % | SYSTOLIC BLOOD PRESSURE: 168 MMHG | DIASTOLIC BLOOD PRESSURE: 90 MMHG | HEIGHT: 68 IN | WEIGHT: 143 LBS | BODY MASS INDEX: 21.67 KG/M2 | HEART RATE: 49 BPM

## 2019-02-22 DIAGNOSIS — R00.2 PALPITATIONS: Primary | ICD-10-CM

## 2019-02-22 LAB
ALBUMIN SERPL-MCNC: 4.27 G/DL (ref 3.2–4.8)
ALBUMIN/GLOB SERPL: 2.2 G/DL (ref 1.5–2.5)
ALP SERPL-CCNC: 58 U/L (ref 25–100)
ALT SERPL W P-5'-P-CCNC: 20 U/L (ref 7–40)
ANION GAP SERPL CALCULATED.3IONS-SCNC: 5 MMOL/L (ref 3–11)
AST SERPL-CCNC: 17 U/L (ref 0–33)
BASOPHILS # BLD AUTO: 0.03 10*3/MM3 (ref 0–0.2)
BASOPHILS NFR BLD AUTO: 0.3 % (ref 0–1)
BILIRUB SERPL-MCNC: 3.2 MG/DL (ref 0.3–1.2)
BNP SERPL-MCNC: 137 PG/ML (ref 0–100)
BUN BLD-MCNC: 17 MG/DL (ref 9–23)
BUN/CREAT SERPL: 16.8 (ref 7–25)
CALCIUM SPEC-SCNC: 9.6 MG/DL (ref 8.7–10.4)
CHLORIDE SERPL-SCNC: 103 MMOL/L (ref 99–109)
CO2 SERPL-SCNC: 30 MMOL/L (ref 20–31)
CREAT BLD-MCNC: 1.01 MG/DL (ref 0.6–1.3)
DEPRECATED RDW RBC AUTO: 46 FL (ref 37–54)
EOSINOPHIL # BLD AUTO: 0.12 10*3/MM3 (ref 0–0.3)
EOSINOPHIL NFR BLD AUTO: 1.3 % (ref 0–3)
ERYTHROCYTE [DISTWIDTH] IN BLOOD BY AUTOMATED COUNT: 13.7 % (ref 11.3–14.5)
GFR SERPL CREATININE-BSD FRML MDRD: 72 ML/MIN/1.73
GLOBULIN UR ELPH-MCNC: 1.9 GM/DL
GLUCOSE BLD-MCNC: 95 MG/DL (ref 70–100)
HCT VFR BLD AUTO: 46.7 % (ref 38.9–50.9)
HGB BLD-MCNC: 15.8 G/DL (ref 13.1–17.5)
HOLD SPECIMEN: NORMAL
HOLD SPECIMEN: NORMAL
IMM GRANULOCYTES # BLD AUTO: 0.03 10*3/MM3 (ref 0–0.05)
IMM GRANULOCYTES NFR BLD AUTO: 0.3 % (ref 0–0.6)
LYMPHOCYTES # BLD AUTO: 0.91 10*3/MM3 (ref 0.6–4.8)
LYMPHOCYTES NFR BLD AUTO: 9.9 % (ref 24–44)
MAGNESIUM SERPL-MCNC: 2.3 MG/DL (ref 1.3–2.7)
MCH RBC QN AUTO: 31.3 PG (ref 27–31)
MCHC RBC AUTO-ENTMCNC: 33.8 G/DL (ref 32–36)
MCV RBC AUTO: 92.5 FL (ref 80–99)
MONOCYTES # BLD AUTO: 0.92 10*3/MM3 (ref 0–1)
MONOCYTES NFR BLD AUTO: 10 % (ref 0–12)
NEUTROPHILS # BLD AUTO: 7.23 10*3/MM3 (ref 1.5–8.3)
NEUTROPHILS NFR BLD AUTO: 78.5 % (ref 41–71)
PLATELET # BLD AUTO: 191 10*3/MM3 (ref 150–450)
PMV BLD AUTO: 10.8 FL (ref 6–12)
POTASSIUM BLD-SCNC: 4.5 MMOL/L (ref 3.5–5.5)
PROT SERPL-MCNC: 6.2 G/DL (ref 5.7–8.2)
RBC # BLD AUTO: 5.05 10*6/MM3 (ref 4.2–5.76)
SODIUM BLD-SCNC: 138 MMOL/L (ref 132–146)
TROPONIN I SERPL-MCNC: 0 NG/ML (ref 0–0.07)
TSH SERPL DL<=0.05 MIU/L-ACNC: 4 MIU/ML (ref 0.35–5.35)
WBC NRBC COR # BLD: 9.21 10*3/MM3 (ref 3.5–10.8)
WHOLE BLOOD HOLD SPECIMEN: NORMAL
WHOLE BLOOD HOLD SPECIMEN: NORMAL

## 2019-02-22 PROCEDURE — 83880 ASSAY OF NATRIURETIC PEPTIDE: CPT | Performed by: EMERGENCY MEDICINE

## 2019-02-22 PROCEDURE — 84443 ASSAY THYROID STIM HORMONE: CPT | Performed by: EMERGENCY MEDICINE

## 2019-02-22 PROCEDURE — 83735 ASSAY OF MAGNESIUM: CPT | Performed by: EMERGENCY MEDICINE

## 2019-02-22 PROCEDURE — 71045 X-RAY EXAM CHEST 1 VIEW: CPT

## 2019-02-22 PROCEDURE — 85025 COMPLETE CBC W/AUTO DIFF WBC: CPT

## 2019-02-22 PROCEDURE — 84484 ASSAY OF TROPONIN QUANT: CPT

## 2019-02-22 PROCEDURE — 99284 EMERGENCY DEPT VISIT MOD MDM: CPT

## 2019-02-22 PROCEDURE — 80053 COMPREHEN METABOLIC PANEL: CPT | Performed by: EMERGENCY MEDICINE

## 2019-02-22 PROCEDURE — 93005 ELECTROCARDIOGRAM TRACING: CPT | Performed by: EMERGENCY MEDICINE

## 2019-02-22 RX ORDER — CARVEDILOL 25 MG/1
25 TABLET ORAL 2 TIMES DAILY WITH MEALS
COMMUNITY
End: 2019-05-28 | Stop reason: SDUPTHER

## 2019-02-22 RX ORDER — SODIUM CHLORIDE 0.9 % (FLUSH) 0.9 %
10 SYRINGE (ML) INJECTION AS NEEDED
Status: DISCONTINUED | OUTPATIENT
Start: 2019-02-22 | End: 2019-02-22 | Stop reason: HOSPADM

## 2019-02-26 ENCOUNTER — HOSPITAL ENCOUNTER (OUTPATIENT)
Dept: CARDIOLOGY | Facility: HOSPITAL | Age: 74
Discharge: HOME | End: 2019-02-26
Admitting: NURSE PRACTITIONER

## 2019-02-26 ENCOUNTER — OFFICE VISIT (OUTPATIENT)
Dept: CARDIOLOGY | Facility: HOSPITAL | Age: 74
End: 2019-02-26

## 2019-02-26 VITALS
DIASTOLIC BLOOD PRESSURE: 80 MMHG | WEIGHT: 149 LBS | HEART RATE: 58 BPM | HEIGHT: 68 IN | OXYGEN SATURATION: 100 % | RESPIRATION RATE: 16 BRPM | BODY MASS INDEX: 22.58 KG/M2 | TEMPERATURE: 98 F | SYSTOLIC BLOOD PRESSURE: 140 MMHG

## 2019-02-26 DIAGNOSIS — R93.1 DECREASED CARDIAC EJECTION FRACTION: ICD-10-CM

## 2019-02-26 DIAGNOSIS — Z98.890 H/O MITRAL VALVE REPAIR: ICD-10-CM

## 2019-02-26 DIAGNOSIS — I49.3 PVC'S (PREMATURE VENTRICULAR CONTRACTIONS): Primary | ICD-10-CM

## 2019-02-26 DIAGNOSIS — I10 ESSENTIAL HYPERTENSION: ICD-10-CM

## 2019-02-26 PROCEDURE — 93225 XTRNL ECG REC<48 HRS REC: CPT

## 2019-02-26 PROCEDURE — 99214 OFFICE O/P EST MOD 30 MIN: CPT | Performed by: NURSE PRACTITIONER

## 2019-02-26 PROCEDURE — 93227 XTRNL ECG REC<48 HR R&I: CPT | Performed by: INTERNAL MEDICINE

## 2019-02-26 RX ORDER — BUSPIRONE HYDROCHLORIDE 10 MG/1
10 TABLET ORAL 3 TIMES DAILY
Qty: 90 TABLET | Refills: 3 | Status: SHIPPED | OUTPATIENT
Start: 2019-02-26 | End: 2019-04-23

## 2019-02-26 NOTE — PROGRESS NOTES
Encounter Date:02/26/2019      Patient ID: Michael Mallory is a 73 y.o. male.        Subjective:     Chief Complaint: Establish Care   palpitations   History of Present Illness patient presents to the office today for ongoing evaluation of his palpitations.  He is a patient of Dr. Syed's and is here to establish care with [place] [facility].  He presented to [facility] ED on 2/22 with complaints of intermittent palpitations.  He reports he underwent an MVR at [facility] August 2018.  Prior to his MVR EF was 45% per patient report.  An echo with Dr. Syed October 2018 showed an EF of 25%.  Patient refused LifeVest and ICD implantation.  Patient had been on Entresto but noted he was unable to take due to palpitations in his heart.  Dr. Syed recently transitioned patient from Entresto back to enalapril 10 mg twice daily.  Patient notes that his blood pressure is elevated and he takes as needed clonidine daily.  He refuses to start any other medication.  He notes that he is extremely anxious.    Patient Active Problem List   Diagnosis   • Atrial fibrillation (CMS/HCC)   • Hypertension   • Mitral valve disorder   • Palpitations   • Heart murmur   • Shortness of breath   • Mitral valve insufficiency   • Pulmonary hypertension (CMS/HCC)   • H/O mitral valve repair   • Cardiomyopathy (CMS/HCC)       Past Surgical History:   Procedure Laterality Date   • ATRIAL APPENDAGE EXCLUSION LEFT WITH TRANSESOPHAGEAL ECHOCARDIOGRAM     • HEMORRHOIDECTOMY     • HERNIA REPAIR     • MITRAL VALVE REPAIR/REPLACEMENT         No Known Allergies      Current Outpatient Medications:   •  amiodarone (PACERONE) 200 MG tablet, Take 1 tablet by mouth Take As Directed. 1 tab by mouth BID for 1 week then 200 mg by mouth daily, Disp: 90 tablet, Rfl: 3  •  aspirin 81 MG EC tablet, Take 81 mg by mouth Daily., Disp: , Rfl:   •  carvedilol (COREG) 25 MG tablet, Take 25 mg by mouth 2 (Two) Times a Day With Meals., Disp: , Rfl:    •  CloNIDine (CATAPRES) 0.1 MG tablet, Take 1 tablet by mouth 3 (Three) Times a Day As Needed for High Blood Pressure. > 160/90, Disp: 90 tablet, Rfl: 5  •  enalapril (VASOTEC) 10 MG tablet, Take 1 tablet by mouth 2 (Two) Times a Day., Disp: 60 tablet, Rfl: 11  •  melatonin 1 MG tablet, Take 1.5 mg by mouth Every Night., Disp: , Rfl:   The following portions of the chart were reviewed and updated as appropriate: Allergies, current medications, past family history, social history, past medical history.     Review of Systems   Constitution: Positive for malaise/fatigue. Negative for chills, decreased appetite, diaphoresis, fever, weakness, night sweats, weight gain and weight loss.   HENT: Negative for congestion, hearing loss, hoarse voice and nosebleeds.    Eyes: Negative for blurred vision, visual disturbance and visual halos.   Cardiovascular: Positive for irregular heartbeat. Negative for chest pain, claudication, cyanosis, dyspnea on exertion, leg swelling, near-syncope, orthopnea, palpitations, paroxysmal nocturnal dyspnea and syncope.   Respiratory: Negative for cough, hemoptysis, shortness of breath, sleep disturbances due to breathing, snoring, sputum production and wheezing.    Endocrine: Positive for polyuria.   Hematologic/Lymphatic: Negative for bleeding problem. Does not bruise/bleed easily.   Skin: Negative for dry skin, itching and rash.   Musculoskeletal: Negative for arthritis, falls, joint pain, joint swelling and myalgias.   Gastrointestinal: Positive for abdominal pain. Negative for bloating, constipation, diarrhea, flatus, heartburn, hematemesis, hematochezia, melena, nausea and vomiting.   Genitourinary: Negative for dysuria, frequency, hematuria, nocturia and urgency.   Neurological: Negative for excessive daytime sleepiness, dizziness, headaches, light-headedness and loss of balance.   Psychiatric/Behavioral: Negative for depression. The patient is nervous/anxious. The patient does not have  "insomnia.            Objective:     Vitals:    02/26/19 1348   BP: 140/80   BP Location: Left arm   Patient Position: Sitting   Pulse: 58   Resp: 16   Temp: 98 °F (36.7 °C)   TempSrc: Temporal   SpO2: 100%   Weight: 67.6 kg (149 lb)   Height: 172.7 cm (68\")         Physical Exam   Constitutional: He is oriented to person, place, and time. He appears well-developed and well-nourished. He is active and cooperative. No distress.   HENT:   Head: Normocephalic and atraumatic.   Mouth/Throat: Oropharynx is clear and moist.   Eyes: Conjunctivae and EOM are normal. Pupils are equal, round, and reactive to light.   Neck: Normal range of motion. Neck supple. No JVD present. No tracheal deviation present. No thyromegaly present.   Cardiovascular: Normal rate, regular rhythm, normal heart sounds and intact distal pulses. Frequent extrasystoles are present.   Pulmonary/Chest: Effort normal and breath sounds normal.   Abdominal: Soft. Bowel sounds are normal. He exhibits no distension. There is no tenderness.   Musculoskeletal: Normal range of motion.   Neurological: He is alert and oriented to person, place, and time.   Skin: Skin is warm, dry and intact.   Psychiatric: He has a normal mood and affect. His behavior is normal.   Nursing note and vitals reviewed.      Lab and Diagnostic Review:    Sinus bradycardia with 1st degree AV block  Left axis deviation  Incomplete right bundle branch block  Cannot rule out Anteroseptal infarct , age undetermined  Abnormal ECG  No previous ECGs available  Confirmed by ADDISON GONZALEZ MD ([id number]) on 2/24/2019 7:47:20 PM  Results for orders placed or performed during the hospital encounter of 02/22/19   Comprehensive Metabolic Panel   Result Value Ref Range    Glucose 95 70 - 100 mg/dL    BUN 17 9 - 23 mg/dL    Creatinine 1.01 0.60 - 1.30 mg/dL    Sodium 138 132 - 146 mmol/L    Potassium 4.5 3.5 - 5.5 mmol/L    Chloride 103 99 - 109 mmol/L    CO2 30.0 20.0 - 31.0 mmol/L    Calcium 9.6 8.7 - 10.4 " mg/dL    Total Protein 6.2 5.7 - 8.2 g/dL    Albumin 4.27 3.20 - 4.80 g/dL    ALT (SGPT) 20 7 - 40 U/L    AST (SGOT) 17 0 - 33 U/L    Alkaline Phosphatase 58 25 - 100 U/L    Total Bilirubin 3.2 (H) 0.3 - 1.2 mg/dL    eGFR Non African Amer 72 >60 mL/min/1.73    Globulin 1.9 gm/dL    A/G Ratio 2.2 1.5 - 2.5 g/dL    BUN/Creatinine Ratio 16.8 7.0 - 25.0    Anion Gap 5.0 3.0 - 11.0 mmol/L   Magnesium   Result Value Ref Range    Magnesium 2.3 1.3 - 2.7 mg/dL   TSH   Result Value Ref Range    TSH 4.001 0.350 - 5.350 mIU/mL   BNP   Result Value Ref Range    .0 (H) 0.0 - 100.0 pg/mL   CBC Auto Differential   Result Value Ref Range    WBC 9.21 3.50 - 10.80 10*3/mm3    RBC 5.05 4.20 - 5.76 10*6/mm3    Hemoglobin 15.8 13.1 - 17.5 g/dL    Hematocrit 46.7 38.9 - 50.9 %    MCV 92.5 80.0 - 99.0 fL    MCH 31.3 (H) 27.0 - 31.0 pg    MCHC 33.8 32.0 - 36.0 g/dL    RDW 13.7 11.3 - 14.5 %    RDW-SD 46.0 37.0 - 54.0 fl    MPV 10.8 6.0 - 12.0 fL    Platelets 191 150 - 450 10*3/mm3    Neutrophil % 78.5 (H) 41.0 - 71.0 %    Lymphocyte % 9.9 (L) 24.0 - 44.0 %    Monocyte % 10.0 0.0 - 12.0 %    Eosinophil % 1.3 0.0 - 3.0 %    Basophil % 0.3 0.0 - 1.0 %    Immature Grans % 0.3 0.0 - 0.6 %    Neutrophils, Absolute 7.23 1.50 - 8.30 10*3/mm3    Lymphocytes, Absolute 0.91 0.60 - 4.80 10*3/mm3    Monocytes, Absolute 0.92 0.00 - 1.00 10*3/mm3    Eosinophils, Absolute 0.12 0.00 - 0.30 10*3/mm3    Basophils, Absolute 0.03 0.00 - 0.20 10*3/mm3    Immature Grans, Absolute 0.03 0.00 - 0.05 10*3/mm3   POC Troponin, Rapid   Result Value Ref Range    Troponin I 0.00 0.00 - 0.07 ng/mL   Light Blue Top   Result Value Ref Range    Extra Tube hold for add-on    Green Top (Gel)   Result Value Ref Range    Extra Tube Hold for add-ons.    Lavender Top   Result Value Ref Range    Extra Tube hold for add-on    Gold Top - SST   Result Value Ref Range    Extra Tube Hold for add-ons.        Assessment and Plan:         1. PVC's (premature ventricular  contractions)  More frequent lately   - Holter Monitor - 24 Hour to assess burden     2. Decreased cardiac ejection fraction  Patient is on coreg  Discussed entresto with patient and patient's wife. Patient chooses to remain on enalapril 10 mg bid  Reviewed recent echo October that showed depressed ef  Discussed life vest, patient declined    3. Essential hypertension  Recommended 24 hour bp monitor but patient refused  Recommended uptitrating enalapril but patient refused      4. H/O mitral valve repair  August 2018 at [facility]    It has been a pleasure to participate in the care of this patient.  Patient was instructed to call the [facility] with any questions, concerns, or worsening symptoms.        * Please note that portions of this note were completed with a voice recognition program. Efforts were made to edit the dictation but occasionally words are transcribed.

## 2019-03-21 ENCOUNTER — TELEPHONE (OUTPATIENT)
Dept: CARDIOLOGY | Facility: HOSPITAL | Age: 74
End: 2019-03-21

## 2019-03-21 NOTE — TELEPHONE ENCOUNTER
Attempted to call patient to discuss final read on holter. Unable to reach patient. Will try back at another time.

## 2019-03-26 DIAGNOSIS — I49.3 PVC'S (PREMATURE VENTRICULAR CONTRACTIONS): Primary | ICD-10-CM

## 2019-04-23 ENCOUNTER — CONSULT (OUTPATIENT)
Dept: CARDIOLOGY | Facility: CLINIC | Age: 74
End: 2019-04-23

## 2019-04-23 VITALS
HEART RATE: 48 BPM | BODY MASS INDEX: 22.13 KG/M2 | DIASTOLIC BLOOD PRESSURE: 90 MMHG | WEIGHT: 146 LBS | HEIGHT: 68 IN | SYSTOLIC BLOOD PRESSURE: 180 MMHG

## 2019-04-23 DIAGNOSIS — I48.0 PAROXYSMAL ATRIAL FIBRILLATION (HCC): ICD-10-CM

## 2019-04-23 DIAGNOSIS — I05.9 MITRAL VALVE DISORDER: ICD-10-CM

## 2019-04-23 DIAGNOSIS — I42.0 DILATED CARDIOMYOPATHY (HCC): Primary | ICD-10-CM

## 2019-04-23 PROCEDURE — 99204 OFFICE O/P NEW MOD 45 MIN: CPT | Performed by: INTERNAL MEDICINE

## 2019-04-23 RX ORDER — DIAZEPAM 2 MG/1
2 TABLET ORAL 2 TIMES DAILY PRN
COMMUNITY
End: 2020-03-24

## 2019-04-23 NOTE — PROGRESS NOTES
[facility]  Consultation H&P  Michael Aldrichuse  1945    There is no work phone number on file..    VISIT DATE:  04/23/2019    PCP: Jerome Jara MD  [address] [place] [id number]    CC:  Chief Complaint   Patient presents with   • pvc's   • Atrial Fibrillation     Previous cardiac studies and procedures:    ASSESSMENT:   Diagnosis Plan   1. Dilated cardiomyopathy (CMS/HCC)  Adult Transthoracic Echo Complete W/ Cont if Necessary Per Protocol   2. Mitral valve disorder     3. Paroxysmal atrial fibrillation (CMS/HCC)           PLAN:  Congestive heart failure, chronic, systolic: Secondary to valvular heart disease.  Currently euvolemic and compensated with preserved functional capacity, [place] heart class I.  EF 23% by MUGA, 25% by echo.  He does not want any further titration of ACE inhibitor or addition of a second class of medication such as spironolactone.  Amenable to titration of clonidine for afterload reduction as this appears to helped his blood pressure.  Continue carvedilol 25 mg p.o. twice daily and enalapril 10 mg p.o. twice daily.  Repeating echo in 1 month to assess EF prior to consideration for permanent ICD implantation.  LifeVest is been previously discussed, offered and declined.    Hypertension: Goal less than 130/80 mmHg.  Blood pressures predominantly running less than 135/85 mmHg with slightly elevated blood pressures in the a.m. hours.  He is going to increase his morning clonidine to 0.1 mg.    PVCs: Currently asymptomatic.  Secondary to underlying cardiomyopathy.  Continue beta-blockade and amiodarone.    Paroxysmal atrial fibrillation: Occurred postoperatively.  Has been maintaining sinus rhythm on amiodarone.  Status post left atrial clipping.  Agree that is reasonable to stay off anticoagulation in the setting.  Discussed alternatives to amiodarone such as Tikosyn in order to limit long-term toxicities.  Decreasing amiodarone to 100 mg p.o.  "daily.  We will likely discontinue amiodarone and trend for symptoms of recurrent A. fib after he is on a stable medical therapy for underlying cardiomyopathy and decision has been made regarding ICD implantation.    History of Present Illness   73-year-old gentleman with long-standing history of mitral valve prolapse with significant mitral regurgitation who underwent mitral valve repair and placement of a Xiong Mitral Valve Annuloplasty Ring (size #35) and NEELA clip at [facility] in August 2018.  Preoperative EF of 65%, immediately postoperative 45%.  Did develop postoperative atrial fibrillation which was treated by amiodarone and elective cardioversion on September 2, 2018 with maintenance of sinus rhythm since that time.  He was on a transient course of Coumadin but eventually decided to go off of this with his primary cardiologist due to dietary limitations.  Follow-up EF assessment by echo and MUGA scan in October and November 2018 revealed an EF of 25 to 30% by echo and an EF of 23% by MUGA scan.  He denies dyspnea on exertion.  No PND orthopnea.  No lower extremity edema.  Denies palpitations.  No chest discomfort.  Walks 2 miles most days a week without any difficulty.  Reviewed blood pressure trend since addition of low-dose clonidine.  He does not feel like higher doses of enalapril are effective.  He had previously been on Entresto but then reported side effects of palpitations.  Appears to be an intelligent individual but has a high baseline level of anxiety.    PHYSICAL EXAMINATION:  Vitals:    04/23/19 1334   BP: 180/90   BP Location: Left arm   Patient Position: Sitting   Pulse: (!) 48   Weight: 66.2 kg (146 lb)   Height: 172.7 cm (68\")     General Appearance:    Alert, cooperative, no distress, appears stated age   Head:    Normocephalic, without obvious abnormality, atraumatic   Eyes:    conjunctiva/corneas clear, EOM's intact, fundi     benign, both eyes   Ears:    Normal TM's and external " ear canals, both ears   Nose:   Nares normal, septum midline, mucosa normal, no drainage    or sinus tenderness   Throat:   Lips, mucosa, and tongue normal; teeth and gums normal   Neck:   Supple, symmetrical, trachea midline, no adenopathy;     thyroid:  no enlargement/tenderness/nodules; no carotid    bruit or JVD   Back:     Symmetric, no curvature, ROM normal, no CVA tenderness   Lungs:     Clear to auscultation bilaterally, respirations unlabored   Chest Wall:    No tenderness or deformity    Heart:    Regular rate and rhythm, S1 and S2 normal, no murmur, rub   or gallop, normal carotid impulse bilaterally without bruit.   Abdomen:     Soft, non-tender, bowel sounds active all four quadrants,     no masses, no organomegaly   Extremities:   Extremities normal, atraumatic, no cyanosis or edema   Pulses:   2+ and symmetric all extremities   Skin:   Skin color, texture, turgor normal, no rashes or lesions   Lymph nodes:   Cervical, supraclavicular, and axillary nodes normal   Neurologic:   normal strength, sensation intact     throughout       Diagnostic Data:  Procedures  No results found for: CHLPL, TRIG, HDL, LDLDIRECT  Lab Results   Component Value Date    GLUCOSE 95 02/22/2019    BUN 17 02/22/2019    CREATININE 1.01 02/22/2019     02/22/2019    K 4.5 02/22/2019     02/22/2019    CO2 30.0 02/22/2019     No results found for: HGBA1C  Lab Results   Component Value Date    WBC 9.21 02/22/2019    HGB 15.8 02/22/2019    HCT 46.7 02/22/2019     02/22/2019       PROBLEM LIST:  Patient Active Problem List   Diagnosis   • Atrial fibrillation (CMS/HCC)   • Hypertension   • Mitral valve disorder   • Palpitations   • Heart murmur   • Shortness of breath   • Mitral valve insufficiency   • Pulmonary hypertension (CMS/HCC)   • H/O mitral valve repair   • Cardiomyopathy (CMS/HCC)       PAST MEDICAL HX  Past Medical History:   Diagnosis Date   • Anxiety    • Atrial fibrillation (CMS/HCC) 8/22/2016   •  Hypertension 8/22/2016   • Mitral valve disorder    • Palpitations 8/22/2016       Allergies  No Known Allergies    Current Medications    Current Outpatient Medications:   •  amiodarone (PACERONE) 200 MG tablet, Take 1 tablet by mouth Take As Directed. 1 tab by mouth BID for 1 week then 200 mg by mouth daily, Disp: 90 tablet, Rfl: 3  •  aspirin 81 MG EC tablet, Take 81 mg by mouth Daily., Disp: , Rfl:   •  carvedilol (COREG) 25 MG tablet, Take 25 mg by mouth 2 (Two) Times a Day With Meals., Disp: , Rfl:   •  CloNIDine (CATAPRES) 0.1 MG tablet, Take 1 tablet by mouth 3 (Three) Times a Day As Needed for High Blood Pressure. > 160/90 (Patient taking differently: Take 0.05 mg by mouth 3 (Three) Times a Day As Needed for High Blood Pressure. > 160/90), Disp: 90 tablet, Rfl: 5  •  diazePAM (VALIUM) 2 MG tablet, Take 2 mg by mouth 2 (Two) Times a Day As Needed for Anxiety., Disp: , Rfl:   •  enalapril (VASOTEC) 10 MG tablet, Take 1 tablet by mouth 2 (Two) Times a Day., Disp: 60 tablet, Rfl: 11  •  melatonin 1 MG tablet, Take 1.5 mg by mouth Every Night., Disp: , Rfl:          ROS  Review of Systems   Cardiovascular: Positive for chest pain. Negative for dyspnea on exertion, leg swelling and palpitations.   Respiratory: Negative for shortness of breath.    Psychiatric/Behavioral: The patient is nervous/anxious.        All other body systems reviewed and are negative    SOCIAL HX  Social History     Socioeconomic History   • Marital status:      Spouse name: Not on file   • Number of children: Not on file   • Years of education: Not on file   • Highest education level: Not on file   Tobacco Use   • Smoking status: Never Smoker   • Smokeless tobacco: Never Used   Substance and Sexual Activity   • Alcohol use: Yes     Comment: infrequent and very little   • Drug use: No   Social History Narrative    Caffeine: 1 serving per day.       FAMILY HX  Family History   Problem Relation Age of Onset   • Heart attack Father    •  Transient ischemic attack Mother    • Cancer Mother              Howard Rivera III, MD, FACC

## 2019-05-18 DIAGNOSIS — R06.02 SHORTNESS OF BREATH: ICD-10-CM

## 2019-05-18 DIAGNOSIS — I48.0 PAROXYSMAL ATRIAL FIBRILLATION (HCC): ICD-10-CM

## 2019-05-18 DIAGNOSIS — Z98.890 H/O MITRAL VALVE REPAIR: ICD-10-CM

## 2019-05-18 DIAGNOSIS — I05.9 MITRAL VALVE DISORDER: ICD-10-CM

## 2019-05-18 DIAGNOSIS — I34.0 MITRAL VALVE INSUFFICIENCY, UNSPECIFIED ETIOLOGY: ICD-10-CM

## 2019-05-18 DIAGNOSIS — R93.1 DECREASED CARDIAC EJECTION FRACTION: ICD-10-CM

## 2019-05-18 DIAGNOSIS — I10 ESSENTIAL HYPERTENSION: ICD-10-CM

## 2019-05-20 RX ORDER — CARVEDILOL 25 MG/1
TABLET ORAL
Qty: 60 TABLET | Refills: 6 | OUTPATIENT
Start: 2019-05-20

## 2019-05-21 DIAGNOSIS — I10 ESSENTIAL HYPERTENSION: ICD-10-CM

## 2019-05-21 DIAGNOSIS — R06.02 SHORTNESS OF BREATH: ICD-10-CM

## 2019-05-21 DIAGNOSIS — I05.9 MITRAL VALVE DISORDER: ICD-10-CM

## 2019-05-21 DIAGNOSIS — I48.0 PAROXYSMAL ATRIAL FIBRILLATION (HCC): ICD-10-CM

## 2019-05-21 DIAGNOSIS — I34.0 MITRAL VALVE INSUFFICIENCY, UNSPECIFIED ETIOLOGY: ICD-10-CM

## 2019-05-21 DIAGNOSIS — R93.1 DECREASED CARDIAC EJECTION FRACTION: ICD-10-CM

## 2019-05-21 DIAGNOSIS — Z98.890 H/O MITRAL VALVE REPAIR: ICD-10-CM

## 2019-05-21 RX ORDER — CARVEDILOL 25 MG/1
TABLET ORAL
Qty: 60 TABLET | Refills: 6 | OUTPATIENT
Start: 2019-05-21

## 2019-05-28 ENCOUNTER — HOSPITAL ENCOUNTER (OUTPATIENT)
Dept: CARDIOLOGY | Facility: HOSPITAL | Age: 74
Discharge: HOME | End: 2019-05-28
Admitting: INTERNAL MEDICINE

## 2019-05-28 ENCOUNTER — OFFICE VISIT (OUTPATIENT)
Dept: CARDIOLOGY | Facility: CLINIC | Age: 74
End: 2019-05-28

## 2019-05-28 VITALS — HEIGHT: 68 IN | BODY MASS INDEX: 22.13 KG/M2 | WEIGHT: 146 LBS

## 2019-05-28 VITALS
HEIGHT: 68 IN | WEIGHT: 148 LBS | DIASTOLIC BLOOD PRESSURE: 84 MMHG | SYSTOLIC BLOOD PRESSURE: 172 MMHG | OXYGEN SATURATION: 96 % | HEART RATE: 54 BPM | BODY MASS INDEX: 22.43 KG/M2

## 2019-05-28 DIAGNOSIS — I05.9 MITRAL VALVE DISORDER: ICD-10-CM

## 2019-05-28 DIAGNOSIS — I48.0 PAROXYSMAL ATRIAL FIBRILLATION (HCC): Primary | ICD-10-CM

## 2019-05-28 DIAGNOSIS — I42.0 DILATED CARDIOMYOPATHY (HCC): ICD-10-CM

## 2019-05-28 DIAGNOSIS — I10 ESSENTIAL HYPERTENSION: ICD-10-CM

## 2019-05-28 DIAGNOSIS — I42.9 CARDIOMYOPATHY, UNSPECIFIED TYPE (HCC): ICD-10-CM

## 2019-05-28 LAB
BH CV ECHO MEAS - AO ROOT AREA (BSA CORRECTED): 2.2
BH CV ECHO MEAS - AO ROOT AREA: 12.2 CM^2
BH CV ECHO MEAS - AO ROOT DIAM: 3.9 CM
BH CV ECHO MEAS - ASC AORTA: 3.8 CM
BH CV ECHO MEAS - BSA(HAYCOCK): 1.8 M^2
BH CV ECHO MEAS - BSA: 1.8 M^2
BH CV ECHO MEAS - BZI_BMI: 22.2 KILOGRAMS/M^2
BH CV ECHO MEAS - BZI_METRIC_HEIGHT: 172.7 CM
BH CV ECHO MEAS - BZI_METRIC_WEIGHT: 66.2 KG
BH CV ECHO MEAS - EDV(CUBED): 129.8 ML
BH CV ECHO MEAS - EDV(MOD-SP2): 190 ML
BH CV ECHO MEAS - EDV(MOD-SP4): 159 ML
BH CV ECHO MEAS - EDV(TEICH): 121.8 ML
BH CV ECHO MEAS - EF(CUBED): 59.7 %
BH CV ECHO MEAS - EF(MOD-BP): 47 %
BH CV ECHO MEAS - EF(MOD-SP2): 49.5 %
BH CV ECHO MEAS - EF(MOD-SP4): 43.4 %
BH CV ECHO MEAS - EF(TEICH): 51 %
BH CV ECHO MEAS - ESV(CUBED): 52.3 ML
BH CV ECHO MEAS - ESV(MOD-SP2): 96 ML
BH CV ECHO MEAS - ESV(MOD-SP4): 90 ML
BH CV ECHO MEAS - ESV(TEICH): 59.7 ML
BH CV ECHO MEAS - FS: 26.1 %
BH CV ECHO MEAS - IVS/LVPW: 1
BH CV ECHO MEAS - IVSD: 1.2 CM
BH CV ECHO MEAS - LAD MAJOR: 5.4 CM
BH CV ECHO MEAS - LAT PEAK E' VEL: 11.7 CM/SEC
BH CV ECHO MEAS - LATERAL E/E' RATIO: 10.9
BH CV ECHO MEAS - LV DIASTOLIC VOL/BSA (35-75): 88.9 ML/M^2
BH CV ECHO MEAS - LV IVRT: 0.12 SEC
BH CV ECHO MEAS - LV MASS(C)D: 235.9 GRAMS
BH CV ECHO MEAS - LV MASS(C)DI: 132 GRAMS/M^2
BH CV ECHO MEAS - LV SYSTOLIC VOL/BSA (12-30): 50.3 ML/M^2
BH CV ECHO MEAS - LVIDD: 5.1 CM
BH CV ECHO MEAS - LVIDS: 3.7 CM
BH CV ECHO MEAS - LVLD AP2: 9.3 CM
BH CV ECHO MEAS - LVLD AP4: 8.8 CM
BH CV ECHO MEAS - LVLS AP2: 7.9 CM
BH CV ECHO MEAS - LVLS AP4: 8.1 CM
BH CV ECHO MEAS - LVOT AREA (M): 3.8 CM^2
BH CV ECHO MEAS - LVOT AREA: 3.7 CM^2
BH CV ECHO MEAS - LVOT DIAM: 2.2 CM
BH CV ECHO MEAS - LVPWD: 1.2 CM
BH CV ECHO MEAS - MED PEAK E' VEL: 4.5 CM/SEC
BH CV ECHO MEAS - MEDIAL E/E' RATIO: 28
BH CV ECHO MEAS - MPA AREA: 7.2 CM^2
BH CV ECHO MEAS - MPA DIAM: 3 CM
BH CV ECHO MEAS - MR ALIAS VEL: 26 CM/SEC
BH CV ECHO MEAS - MR ERO: 0.25 CM^2
BH CV ECHO MEAS - MR FLOW RATE: 146.2 CM^3/SEC
BH CV ECHO MEAS - MR MAX PG: 140 MMHG
BH CV ECHO MEAS - MR MAX VEL: 586.4 CM/SEC
BH CV ECHO MEAS - MR MEAN PG: 85 MMHG
BH CV ECHO MEAS - MR MEAN VEL: 424.6 CM/SEC
BH CV ECHO MEAS - MR PISA RADIUS: 0.95 CM
BH CV ECHO MEAS - MR PISA: 5.6 CM^2
BH CV ECHO MEAS - MR VOLUME: 58.5 ML
BH CV ECHO MEAS - MR VTI: 234.7 CM
BH CV ECHO MEAS - MV A MAX VEL: 53.2 CM/SEC
BH CV ECHO MEAS - MV AREA (1 DIAM): 8.5 CM^2
BH CV ECHO MEAS - MV DEC TIME: 0.17 SEC
BH CV ECHO MEAS - MV DIAM: 3.3 CM
BH CV ECHO MEAS - MV E MAX VEL: 129.4 CM/SEC
BH CV ECHO MEAS - MV E/A: 2.4
BH CV ECHO MEAS - MV FLOW AREA(1DIAM): 8.5 CM^2
BH CV ECHO MEAS - PA ACC SLOPE: 641.1 CM/SEC^2
BH CV ECHO MEAS - PA ACC TIME: 0.1 SEC
BH CV ECHO MEAS - PA PR(ACCEL): 35.4 MMHG
BH CV ECHO MEAS - PI END-D VEL: 170.9 CM/SEC
BH CV ECHO MEAS - RAP SYSTOLE: 7 MMHG
BH CV ECHO MEAS - RVSP: 35 MMHG
BH CV ECHO MEAS - SI(CUBED): 43.3 ML/M^2
BH CV ECHO MEAS - SI(MOD-SP2): 52.6 ML/M^2
BH CV ECHO MEAS - SI(MOD-SP4): 38.6 ML/M^2
BH CV ECHO MEAS - SI(TEICH): 34.7 ML/M^2
BH CV ECHO MEAS - SV(CUBED): 77.5 ML
BH CV ECHO MEAS - SV(MOD-SP2): 94 ML
BH CV ECHO MEAS - SV(MOD-SP4): 69 ML
BH CV ECHO MEAS - SV(TEICH): 62.1 ML
BH CV ECHO MEAS - TAPSE (>1.6): 1.9 CM2
BH CV ECHO MEAS - TR MAX PG: 28 MMHG
BH CV ECHO MEAS - TR MAX VEL: 262 CM/SEC
BH CV ECHO MEASUREMENTS AVERAGE E/E' RATIO: 15.98
BH CV VAS BP LEFT ARM: NORMAL MMHG
BH CV XLRA - RV BASE: 5.1 CM
BH CV XLRA - RV LENGTH: 7.4 CM
BH CV XLRA - RV MID: 3.7 CM
BH CV XLRA - TDI S': 10.7 CM/SEC

## 2019-05-28 PROCEDURE — 99214 OFFICE O/P EST MOD 30 MIN: CPT | Performed by: INTERNAL MEDICINE

## 2019-05-28 PROCEDURE — 93306 TTE W/DOPPLER COMPLETE: CPT

## 2019-05-28 PROCEDURE — 93306 TTE W/DOPPLER COMPLETE: CPT | Performed by: INTERNAL MEDICINE

## 2019-05-28 RX ORDER — CARVEDILOL 25 MG/1
25 TABLET ORAL 2 TIMES DAILY WITH MEALS
Qty: 180 TABLET | Refills: 3 | Status: CANCELLED | OUTPATIENT
Start: 2019-05-28

## 2019-05-28 RX ORDER — ENALAPRIL MALEATE 10 MG/1
5 TABLET ORAL 2 TIMES DAILY
Qty: 60 TABLET | Refills: 11
Start: 2019-05-28 | End: 2019-08-28 | Stop reason: DRUGHIGH

## 2019-05-28 RX ORDER — CARVEDILOL 25 MG/1
12.5 TABLET ORAL 2 TIMES DAILY WITH MEALS
Qty: 90 TABLET | Refills: 3 | Status: SHIPPED | OUTPATIENT
Start: 2019-05-28 | End: 2019-09-25 | Stop reason: DRUGHIGH

## 2019-05-28 RX ORDER — CLONIDINE HYDROCHLORIDE 0.1 MG/1
0.1 TABLET ORAL 2 TIMES DAILY PRN
Start: 2019-05-28 | End: 2020-03-24 | Stop reason: SDUPTHER

## 2019-05-28 NOTE — PROGRESS NOTES
[facility]  Office visit  Michael Mallory  1945    There is no work phone number on file.    VISIT DATE:  5/28/2019    PCP: Jerome Jara MD  [address] [place] [id number]    CC:  Chief Complaint   Patient presents with   • Atrial Fibrillation   • Chest Pain   • Hypertension         ASSESSMENT:   Diagnosis Plan   1. Paroxysmal atrial fibrillation (CMS/HCC)     2. Mitral valve disorder     3. Dilated cardiomyopathy (CMS/HCC)         PLAN:  Congestive heart failure, chronic, systolic: Secondary to valvular heart disease.  Currently euvolemic and compensated with preserved functional capacity, [place] heart class I.  EF 23% by MUGA, 25% by echo.    Repeat echo today with an EF of approximately 45%.  He does not want any further titration of ACE inhibitor or addition of a second class of medication such as spironolactone.    Continue carvedilol 12.5 mg p.o. twice daily, develops fatigue at higher doses.    Hypertension: Goal less than 130/80 mmHg.  Blood pressures predominantly running less than 130/80 mmHg.  Continue current medical therapy.    PVCs: Currently asymptomatic.  Secondary to underlying cardiomyopathy.  Continue beta-blockade and amiodarone.     Paroxysmal atrial fibrillation: Occurred postoperatively.  Has been maintaining sinus rhythm on amiodarone.  Status post left atrial clipping.  Agree that is reasonable to stay off anticoagulation in the setting.  Discussed alternatives to amiodarone such as Tikosyn in order to limit long-term toxicities.    Continue amiodarone to 100 mg p.o. Daily.      Mitral regurgitation: Moderate to severe mitral regurgitation status post mitral valve repair.  Currently asymptomatic, [place] heart class I.  Will trend LV systolic function closely, would likely require a surgical mitral valve replacement in the future if percutaneous options for therapy are not available.      Subjective  73-year-old gentleman with  "long-standing history of mitral valve prolapse with significant mitral regurgitation who underwent mitral valve repair and placement of a Xiong Mitral Valve Annuloplasty Ring (size #35) and NEELA clip at [facility] in August 2018.  Preoperative EF of 65%, immediately postoperative 45%.  Did develop postoperative atrial fibrillation which was treated by amiodarone and elective cardioversion on September 2, 2018 with maintenance of sinus rhythm since that time.  He was on a transient course of Coumadin but eventually decided to go off of this with his primary cardiologist due to dietary limitations.  Follow-up EF assessment by echo and MUGA scan in October and November 2018 revealed an EF of 25 to 30% by echo and an EF of 23% by MUGA scan.    He felt fatigued Coreg 25 mg p.o. twice daily and read that he should not take that dose if his resting heart rates were running less than 55 bpm.  He decreased his carvedilol to 12.5 mg p.o. twice daily and is felt increased energy level.  Currently taking Vasotec 5 mg p.o. twice daily.  Intelligent individual but has a high baseline level of anxiety, clonidine significantly helps with underlying anxiety as well.      PHYSICAL EXAMINATION:  Vitals:    05/28/19 1359   BP: 172/84   BP Location: Left arm   Patient Position: Sitting   Pulse: 54   SpO2: 96%   Weight: 67.1 kg (148 lb)   Height: 172.7 cm (68\")     General Appearance:    Alert, cooperative, no distress, appears stated age   Head:    Normocephalic, without obvious abnormality, atraumatic   Eyes:    conjunctiva/corneas clear   Nose:   Nares normal, septum midline, mucosa normal, no drainage   Throat:   Lips, teeth and gums normal   Neck:   Supple, symmetrical, trachea midline, no carotid    bruit or JVD   Lungs:     Clear to auscultation bilaterally, respirations unlabored   Chest Wall:    No tenderness or deformity    Heart:    Regular rate and rhythm, S1 and S2 normal, III/VI hs murmur apex, rub   or gallop, normal " carotid impulse bilaterally without bruit.   Abdomen:     Soft, non-tender   Extremities:   Extremities normal, atraumatic, no cyanosis or edema   Pulses:   2+ and symmetric all extremities   Skin:   Skin color, texture, turgor normal, no rashes or lesions       Diagnostic Data:  Procedures  No results found for: CHLPL, TRIG, HDL, LDLDIRECT  Lab Results   Component Value Date    GLUCOSE 95 02/22/2019    BUN 17 02/22/2019    CREATININE 1.01 02/22/2019     02/22/2019    K 4.5 02/22/2019     02/22/2019    CO2 30.0 02/22/2019     No results found for: HGBA1C  Lab Results   Component Value Date    WBC 9.21 02/22/2019    HGB 15.8 02/22/2019    HCT 46.7 02/22/2019     02/22/2019       Allergies  No Known Allergies    Current Medications    Current Outpatient Medications:   •  amiodarone (PACERONE) 200 MG tablet, Take 1 tablet by mouth Take As Directed. 1 tab by mouth BID for 1 week then 200 mg by mouth daily (Patient taking differently: Take 100 mg by mouth Take As Directed. 1 tab by mouth BID for 1 week then 200 mg by mouth daily), Disp: 90 tablet, Rfl: 3  •  aspirin 81 MG EC tablet, Take 81 mg by mouth Daily., Disp: , Rfl:   •  carvedilol (COREG) 25 MG tablet, Take 0.5 tablets by mouth 2 (Two) Times a Day With Meals., Disp: 90 tablet, Rfl: 3  •  CloNIDine (CATAPRES) 0.1 MG tablet, Take 1 tablet by mouth 3 (Three) Times a Day As Needed for High Blood Pressure. > 160/90 (Patient taking differently: Take 0.1 mg by mouth 2 (Two) Times a Day As Needed for High Blood Pressure. > 160/90), Disp: 90 tablet, Rfl: 5  •  diazePAM (VALIUM) 2 MG tablet, Take 2 mg by mouth 2 (Two) Times a Day As Needed for Anxiety., Disp: , Rfl:   •  enalapril (VASOTEC) 10 MG tablet, Take 1 tablet by mouth 2 (Two) Times a Day., Disp: 60 tablet, Rfl: 11  •  melatonin 1 MG tablet, Take 1.5 mg by mouth Every Night., Disp: , Rfl:           ROS  Review of Systems   Eyes: Negative for visual disturbance.   Cardiovascular: Positive for  chest pain. Negative for dyspnea on exertion, irregular heartbeat and palpitations.   Respiratory: Negative for cough and shortness of breath.          SOCIAL HX  Social History     Socioeconomic History   • Marital status:      Spouse name: Not on file   • Number of children: Not on file   • Years of education: Not on file   • Highest education level: Not on file   Tobacco Use   • Smoking status: Never Smoker   • Smokeless tobacco: Never Used   Substance and Sexual Activity   • Alcohol use: Yes     Comment: infrequent and very little   • Drug use: No   • Sexual activity: Defer   Social History Narrative    Caffeine: 1 serving per day.       FAMILY HX  Family History   Problem Relation Age of Onset   • Heart attack Father    • Transient ischemic attack Mother    • Cancer Mother              Howard Rivera III, MD, FACC

## 2019-08-28 ENCOUNTER — TELEPHONE (OUTPATIENT)
Dept: CARDIOLOGY | Facility: CLINIC | Age: 74
End: 2019-08-28

## 2019-08-28 RX ORDER — ENALAPRIL MALEATE 10 MG/1
10 TABLET ORAL 2 TIMES DAILY
Qty: 180 TABLET | Refills: 3 | Status: SHIPPED | OUTPATIENT
Start: 2019-08-28 | End: 2020-03-24

## 2019-08-28 NOTE — TELEPHONE ENCOUNTER
He can come in and see me sooner.  ask him if he is comfortable increasing his enalapril to 10 mg p.o. twice daily.

## 2019-08-28 NOTE — TELEPHONE ENCOUNTER
Pt is agreeable to increasing enalapril 10 mg twice daily. I advised to keep a log of BP and HR for a week and call us with readings. Pt verbalized understanding.

## 2019-08-28 NOTE — TELEPHONE ENCOUNTER
Patient called with concerns of trending BP's. He says that in May, June, and July his BP being around 114/78 without any issues.    Patient has recently experienced dizziness and felt lightheaded last night but it went away after a few hours. He reports it is much better today. Pt denies chest pain/tightness, SOB, or swelling. Most recent BP log as follows...    08/22 /122  08/23 /93  08/24 /93  08/25 /108   08/26 /82   08/27 /79  8/28 /114   HR is between 40-50.     Pt is taking all medication in epic as ordered. He wants to know if he needs to be seen sooner. Thoughts?

## 2019-08-29 DIAGNOSIS — I34.0 MITRAL VALVE INSUFFICIENCY, UNSPECIFIED ETIOLOGY: ICD-10-CM

## 2019-08-29 DIAGNOSIS — Z98.890 H/O MITRAL VALVE REPAIR: ICD-10-CM

## 2019-08-29 DIAGNOSIS — I10 ESSENTIAL HYPERTENSION: ICD-10-CM

## 2019-08-29 DIAGNOSIS — R93.1 DECREASED CARDIAC EJECTION FRACTION: ICD-10-CM

## 2019-08-29 DIAGNOSIS — R06.02 SHORTNESS OF BREATH: ICD-10-CM

## 2019-08-29 DIAGNOSIS — I05.9 MITRAL VALVE DISORDER: ICD-10-CM

## 2019-08-29 DIAGNOSIS — I48.0 PAROXYSMAL ATRIAL FIBRILLATION (HCC): ICD-10-CM

## 2019-08-29 RX ORDER — AMIODARONE HYDROCHLORIDE 200 MG/1
TABLET ORAL
Qty: 90 TABLET | Refills: 3 | OUTPATIENT
Start: 2019-08-29

## 2019-08-30 RX ORDER — AMIODARONE HYDROCHLORIDE 200 MG/1
TABLET ORAL
Qty: 90 TABLET | Refills: 3 | OUTPATIENT
Start: 2019-08-30

## 2019-09-05 DIAGNOSIS — Z98.890 H/O MITRAL VALVE REPAIR: ICD-10-CM

## 2019-09-05 DIAGNOSIS — R06.02 SHORTNESS OF BREATH: ICD-10-CM

## 2019-09-05 DIAGNOSIS — R93.1 DECREASED CARDIAC EJECTION FRACTION: ICD-10-CM

## 2019-09-05 DIAGNOSIS — I48.0 PAROXYSMAL ATRIAL FIBRILLATION (HCC): ICD-10-CM

## 2019-09-05 DIAGNOSIS — I05.9 MITRAL VALVE DISORDER: ICD-10-CM

## 2019-09-05 DIAGNOSIS — I34.0 MITRAL VALVE INSUFFICIENCY, UNSPECIFIED ETIOLOGY: ICD-10-CM

## 2019-09-05 DIAGNOSIS — I10 ESSENTIAL HYPERTENSION: ICD-10-CM

## 2019-09-05 RX ORDER — AMIODARONE HYDROCHLORIDE 100 MG/1
100 TABLET ORAL DAILY
Qty: 90 TABLET | Refills: 0 | Status: SHIPPED | OUTPATIENT
Start: 2019-09-05 | End: 2019-12-04

## 2019-09-11 ENCOUNTER — APPOINTMENT (OUTPATIENT)
Dept: GENERAL RADIOLOGY | Facility: HOSPITAL | Age: 74
End: 2019-09-11

## 2019-09-11 ENCOUNTER — HOSPITAL ENCOUNTER (EMERGENCY)
Facility: HOSPITAL | Age: 74
Discharge: HOME | End: 2019-09-11
Attending: EMERGENCY MEDICINE | Admitting: EMERGENCY MEDICINE

## 2019-09-11 VITALS
WEIGHT: 145 LBS | HEART RATE: 49 BPM | OXYGEN SATURATION: 96 % | DIASTOLIC BLOOD PRESSURE: 77 MMHG | RESPIRATION RATE: 16 BRPM | BODY MASS INDEX: 21.98 KG/M2 | TEMPERATURE: 98.3 F | HEIGHT: 68 IN | SYSTOLIC BLOOD PRESSURE: 131 MMHG

## 2019-09-11 DIAGNOSIS — I49.8 VENTRICULAR BIGEMINY: ICD-10-CM

## 2019-09-11 DIAGNOSIS — R09.89 LABILE HYPERTENSION: Primary | ICD-10-CM

## 2019-09-11 DIAGNOSIS — I42.9 CARDIOMYOPATHY, UNSPECIFIED TYPE (HCC): ICD-10-CM

## 2019-09-11 DIAGNOSIS — R00.1 SINUS BRADYCARDIA: ICD-10-CM

## 2019-09-11 LAB
ALBUMIN SERPL-MCNC: 4.5 G/DL (ref 3.5–5.2)
ALBUMIN/GLOB SERPL: 1.9 G/DL
ALP SERPL-CCNC: 55 U/L (ref 39–117)
ALT SERPL W P-5'-P-CCNC: 15 U/L (ref 1–41)
ANION GAP SERPL CALCULATED.3IONS-SCNC: 8 MMOL/L (ref 5–15)
AST SERPL-CCNC: 18 U/L (ref 1–40)
BASOPHILS # BLD AUTO: 0.06 10*3/MM3 (ref 0–0.2)
BASOPHILS NFR BLD AUTO: 0.7 % (ref 0–1.5)
BILIRUB SERPL-MCNC: 2.5 MG/DL (ref 0.2–1.2)
BUN BLD-MCNC: 17 MG/DL (ref 8–23)
BUN/CREAT SERPL: 15.9 (ref 7–25)
CALCIUM SPEC-SCNC: 9.4 MG/DL (ref 8.6–10.5)
CHLORIDE SERPL-SCNC: 101 MMOL/L (ref 98–107)
CO2 SERPL-SCNC: 30 MMOL/L (ref 22–29)
CREAT BLD-MCNC: 1.07 MG/DL (ref 0.76–1.27)
DEPRECATED RDW RBC AUTO: 42.6 FL (ref 37–54)
EOSINOPHIL # BLD AUTO: 0.17 10*3/MM3 (ref 0–0.4)
EOSINOPHIL NFR BLD AUTO: 1.9 % (ref 0.3–6.2)
ERYTHROCYTE [DISTWIDTH] IN BLOOD BY AUTOMATED COUNT: 13.1 % (ref 12.3–15.4)
GFR SERPL CREATININE-BSD FRML MDRD: 68 ML/MIN/1.73
GLOBULIN UR ELPH-MCNC: 2.4 GM/DL
GLUCOSE BLD-MCNC: 102 MG/DL (ref 65–99)
HCT VFR BLD AUTO: 45.5 % (ref 37.5–51)
HGB BLD-MCNC: 15.5 G/DL (ref 13–17.7)
HOLD SPECIMEN: NORMAL
HOLD SPECIMEN: NORMAL
IMM GRANULOCYTES # BLD AUTO: 0.03 10*3/MM3 (ref 0–0.05)
IMM GRANULOCYTES NFR BLD AUTO: 0.3 % (ref 0–0.5)
LYMPHOCYTES # BLD AUTO: 1.64 10*3/MM3 (ref 0.7–3.1)
LYMPHOCYTES NFR BLD AUTO: 18.4 % (ref 19.6–45.3)
MAGNESIUM SERPL-MCNC: 2.2 MG/DL (ref 1.6–2.4)
MCH RBC QN AUTO: 30.6 PG (ref 26.6–33)
MCHC RBC AUTO-ENTMCNC: 34.1 G/DL (ref 31.5–35.7)
MCV RBC AUTO: 89.7 FL (ref 79–97)
MONOCYTES # BLD AUTO: 0.87 10*3/MM3 (ref 0.1–0.9)
MONOCYTES NFR BLD AUTO: 9.8 % (ref 5–12)
NEUTROPHILS # BLD AUTO: 6.15 10*3/MM3 (ref 1.7–7)
NEUTROPHILS NFR BLD AUTO: 68.9 % (ref 42.7–76)
NRBC BLD AUTO-RTO: 0 /100 WBC (ref 0–0.2)
NT-PROBNP SERPL-MCNC: 1092 PG/ML (ref 5–900)
PLATELET # BLD AUTO: 196 10*3/MM3 (ref 140–450)
PMV BLD AUTO: 10.5 FL (ref 6–12)
POTASSIUM BLD-SCNC: 4.6 MMOL/L (ref 3.5–5.2)
PROT SERPL-MCNC: 6.9 G/DL (ref 6–8.5)
RBC # BLD AUTO: 5.07 10*6/MM3 (ref 4.14–5.8)
SODIUM BLD-SCNC: 139 MMOL/L (ref 136–145)
TROPONIN T SERPL-MCNC: <0.01 NG/ML (ref 0–0.03)
TSH SERPL DL<=0.05 MIU/L-ACNC: 4.89 UIU/ML (ref 0.27–4.2)
WBC NRBC COR # BLD: 8.92 10*3/MM3 (ref 3.4–10.8)
WHOLE BLOOD HOLD SPECIMEN: NORMAL
WHOLE BLOOD HOLD SPECIMEN: NORMAL

## 2019-09-11 PROCEDURE — 93005 ELECTROCARDIOGRAM TRACING: CPT | Performed by: EMERGENCY MEDICINE

## 2019-09-11 PROCEDURE — 80053 COMPREHEN METABOLIC PANEL: CPT | Performed by: EMERGENCY MEDICINE

## 2019-09-11 PROCEDURE — 71045 X-RAY EXAM CHEST 1 VIEW: CPT

## 2019-09-11 PROCEDURE — 85025 COMPLETE CBC W/AUTO DIFF WBC: CPT | Performed by: EMERGENCY MEDICINE

## 2019-09-11 PROCEDURE — 84484 ASSAY OF TROPONIN QUANT: CPT | Performed by: EMERGENCY MEDICINE

## 2019-09-11 PROCEDURE — 83735 ASSAY OF MAGNESIUM: CPT | Performed by: EMERGENCY MEDICINE

## 2019-09-11 PROCEDURE — 96374 THER/PROPH/DIAG INJ IV PUSH: CPT

## 2019-09-11 PROCEDURE — 83880 ASSAY OF NATRIURETIC PEPTIDE: CPT | Performed by: EMERGENCY MEDICINE

## 2019-09-11 PROCEDURE — 99285 EMERGENCY DEPT VISIT HI MDM: CPT

## 2019-09-11 PROCEDURE — 84443 ASSAY THYROID STIM HORMONE: CPT | Performed by: EMERGENCY MEDICINE

## 2019-09-11 PROCEDURE — 96376 TX/PRO/DX INJ SAME DRUG ADON: CPT

## 2019-09-11 RX ORDER — ENALAPRILAT 2.5 MG/2ML
1.25 INJECTION INTRAVENOUS ONCE
Status: COMPLETED | OUTPATIENT
Start: 2019-09-11 | End: 2019-09-11

## 2019-09-11 RX ORDER — ENALAPRILAT 2.5 MG/2ML
2.5 INJECTION INTRAVENOUS ONCE
Status: COMPLETED | OUTPATIENT
Start: 2019-09-11 | End: 2019-09-11

## 2019-09-11 RX ORDER — CLONIDINE HYDROCHLORIDE 0.1 MG/1
0.2 TABLET ORAL ONCE
Status: COMPLETED | OUTPATIENT
Start: 2019-09-11 | End: 2019-09-11

## 2019-09-11 RX ORDER — SODIUM CHLORIDE 0.9 % (FLUSH) 0.9 %
10 SYRINGE (ML) INJECTION AS NEEDED
Status: DISCONTINUED | OUTPATIENT
Start: 2019-09-11 | End: 2019-09-12 | Stop reason: HOSPADM

## 2019-09-11 RX ORDER — CARVEDILOL 12.5 MG/1
12.5 TABLET ORAL 2 TIMES DAILY WITH MEALS
Status: DISCONTINUED | OUTPATIENT
Start: 2019-09-11 | End: 2019-09-12 | Stop reason: HOSPADM

## 2019-09-11 RX ADMIN — CARVEDILOL 12.5 MG: 12.5 TABLET, FILM COATED ORAL at 20:10

## 2019-09-11 RX ADMIN — ENALAPRILAT 2.5 MG: 1.25 INJECTION INTRAVENOUS at 19:55

## 2019-09-11 RX ADMIN — ENALAPRILAT 1.25 MG: 1.25 INJECTION INTRAVENOUS at 21:11

## 2019-09-11 RX ADMIN — CLONIDINE HYDROCHLORIDE 0.2 MG: 0.1 TABLET ORAL at 19:55

## 2019-09-11 NOTE — ED PROVIDER NOTES
Subjective   Michael Mallory is a 73 y.o. male who presents to the ED c/o slow heart rate. Patient took his daily 25 minute brisk walk this morning and began to feel fatigued with mild shortness of breath. Later on in the afternoon he felt his heart rate was slower than usual so he had his wife listen with a stethoscope. His wife stated his heart rate was in the 30's beats per minute. He complains of difficulty swallowing but denies cold, cough, rhinorrhea, and swelling in his legs bilaterally. Patient states he currently feels normal baseline. He has a history of A-fib, hypertension, palpitations, and mitral valve disorder but no history of coronary artery disease. Patient has a past surgical history of mitral valve repair on 08/28/2018 and an atrial appendage exclusion left. He currently takes 25 mg of carvedilol daily, 100 mg of amiodarone daily, and .1 mg of CloNIDine twice daily as needed for high blood pressure. He has not been on anticoagulants recently. He took his medication this morning but has not taken his medication for at night. There are no other acute complaints at this time.        History provided by:  Patient  Fatigue   Location:  Generalized  Severity:  Moderate  Onset quality:  Sudden  Duration:  3 hours  Timing:  Constant  Progression:  Worsening  Context:  Patient felt his heart rate was slower than normal. wife said it was in the 30's.  Relieved by:  None tried  Worsened by:  Nothing  Ineffective treatments:  None tried  Associated symptoms: fatigue and shortness of breath (mild)    Associated symptoms: no abdominal pain, no cough and no rhinorrhea        Review of Systems   Constitutional: Positive for fatigue.   HENT: Negative for rhinorrhea.    Respiratory: Positive for shortness of breath (mild). Negative for cough.    Cardiovascular: Negative for palpitations.   Gastrointestinal: Negative for abdominal pain.   All other systems reviewed and are negative.      Past Medical History:    Diagnosis Date   • Anxiety    • Atrial fibrillation (CMS/HCC) 8/22/2016   • Hypertension 8/22/2016   • Mitral valve disorder    • Palpitations 8/22/2016       No Known Allergies    Past Surgical History:   Procedure Laterality Date   • ATRIAL APPENDAGE EXCLUSION LEFT WITH TRANSESOPHAGEAL ECHOCARDIOGRAM     • HEMORRHOIDECTOMY     • HERNIA REPAIR     • MITRAL VALVE REPAIR/REPLACEMENT         Family History   Problem Relation Age of Onset   • Heart attack Father    • Transient ischemic attack Mother    • Cancer Mother        Social History     Socioeconomic History   • Marital status:      Spouse name: Not on file   • Number of children: Not on file   • Years of education: Not on file   • Highest education level: Not on file   Tobacco Use   • Smoking status: Never Smoker   • Smokeless tobacco: Never Used   Substance and Sexual Activity   • Alcohol use: Yes     Comment: infrequent and very little   • Drug use: No   • Sexual activity: Defer   Social History Narrative    Caffeine: 1 serving per day.         Objective   Physical Exam   Constitutional: He is oriented to person, place, and time. He appears well-developed and well-nourished. No distress.   HENT:   Head: Normocephalic and atraumatic.   Airway is patent. Pharynx is benign.   Eyes: Conjunctivae are normal. No scleral icterus.   Normal eye color.   Neck: Normal range of motion. Neck supple.   Cardiovascular: Normal heart sounds. A regularly irregular rhythm present. Bradycardia present.   No murmur heard.  Pulmonary/Chest: Effort normal and breath sounds normal. No respiratory distress.   Lungs are clear to auscultation.   Abdominal: Soft. There is no tenderness.   Musculoskeletal: Normal range of motion. He exhibits no edema.   Neurological: He is alert and oriented to person, place, and time.   Skin: Skin is warm and dry.   Psychiatric: He has a normal mood and affect. His behavior is normal.   Nursing note and vitals reviewed.      Procedures          ED Course  ED Course as of Sep 11 2252   Wed Sep 11, 2019   2109 Discussed with Dr. Acuna who speculates worsening cardiac function due to PVCs leading to adrenaline surge leading to further PVCs and hypertension.  Recommends lowering BP further then consider in or outpatient continuing care.  Most recent BP down to 153/85.    [LI]   2205 Continued bradycardia, even to around 50, but he says that is normal.  BP now well controlled but PVC bigeminy persists.  [LI]      ED Course User Index  [LI] Todd Mcintosh MD     Recent Results (from the past 24 hour(s))   Comprehensive Metabolic Panel    Collection Time: 09/11/19  6:46 PM   Result Value Ref Range    Glucose 102 (H) 65 - 99 mg/dL    BUN 17 8 - 23 mg/dL    Creatinine 1.07 0.76 - 1.27 mg/dL    Sodium 139 136 - 145 mmol/L    Potassium 4.6 3.5 - 5.2 mmol/L    Chloride 101 98 - 107 mmol/L    CO2 30.0 (H) 22.0 - 29.0 mmol/L    Calcium 9.4 8.6 - 10.5 mg/dL    Total Protein 6.9 6.0 - 8.5 g/dL    Albumin 4.50 3.50 - 5.20 g/dL    ALT (SGPT) 15 1 - 41 U/L    AST (SGOT) 18 1 - 40 U/L    Alkaline Phosphatase 55 39 - 117 U/L    Total Bilirubin 2.5 (H) 0.2 - 1.2 mg/dL    eGFR Non African Amer 68 >60 mL/min/1.73    Globulin 2.4 gm/dL    A/G Ratio 1.9 g/dL    BUN/Creatinine Ratio 15.9 7.0 - 25.0    Anion Gap 8.0 5.0 - 15.0 mmol/L   Magnesium    Collection Time: 09/11/19  6:46 PM   Result Value Ref Range    Magnesium 2.2 1.6 - 2.4 mg/dL   Troponin    Collection Time: 09/11/19  6:46 PM   Result Value Ref Range    Troponin T <0.010 0.000 - 0.030 ng/mL   TSH    Collection Time: 09/11/19  6:46 PM   Result Value Ref Range    TSH 4.890 (H) 0.270 - 4.200 uIU/mL   BNP    Collection Time: 09/11/19  6:46 PM   Result Value Ref Range    proBNP 1,092.0 (H) 5.0 - 900.0 pg/mL   Light Blue Top    Collection Time: 09/11/19  6:46 PM   Result Value Ref Range    Extra Tube hold for add-on    Green Top (Gel)    Collection Time: 09/11/19  6:46 PM   Result Value Ref Range    Extra Tube Hold for  add-ons.    Lavender Top    Collection Time: 09/11/19  6:46 PM   Result Value Ref Range    Extra Tube hold for add-on    Gold Top - SST    Collection Time: 09/11/19  6:46 PM   Result Value Ref Range    Extra Tube Hold for add-ons.    CBC Auto Differential    Collection Time: 09/11/19  6:46 PM   Result Value Ref Range    WBC 8.92 3.40 - 10.80 10*3/mm3    RBC 5.07 4.14 - 5.80 10*6/mm3    Hemoglobin 15.5 13.0 - 17.7 g/dL    Hematocrit 45.5 37.5 - 51.0 %    MCV 89.7 79.0 - 97.0 fL    MCH 30.6 26.6 - 33.0 pg    MCHC 34.1 31.5 - 35.7 g/dL    RDW 13.1 12.3 - 15.4 %    RDW-SD 42.6 37.0 - 54.0 fl    MPV 10.5 6.0 - 12.0 fL    Platelets 196 140 - 450 10*3/mm3    Neutrophil % 68.9 42.7 - 76.0 %    Lymphocyte % 18.4 (L) 19.6 - 45.3 %    Monocyte % 9.8 5.0 - 12.0 %    Eosinophil % 1.9 0.3 - 6.2 %    Basophil % 0.7 0.0 - 1.5 %    Immature Grans % 0.3 0.0 - 0.5 %    Neutrophils, Absolute 6.15 1.70 - 7.00 10*3/mm3    Lymphocytes, Absolute 1.64 0.70 - 3.10 10*3/mm3    Monocytes, Absolute 0.87 0.10 - 0.90 10*3/mm3    Eosinophils, Absolute 0.17 0.00 - 0.40 10*3/mm3    Basophils, Absolute 0.06 0.00 - 0.20 10*3/mm3    Immature Grans, Absolute 0.03 0.00 - 0.05 10*3/mm3    nRBC 0.0 0.0 - 0.2 /100 WBC     Note: In addition to lab results from this visit, the labs listed above may include labs taken at another facility or during a different encounter within the last 24 hours. Please correlate lab times with ED admission and discharge times for further clarification of the services performed during this visit.    XR Chest 1 View   Final Result   Cardiomegaly with mild pulmonary vascular congestion. No significant change from prior study.      Signer Name: MACKENZIE Gonzalez MD    Signed: 9/11/2019 8:17 PM    Workstation Name: [id number]     [facility]        Vitals:    09/11/19 2010 09/11/19 2031 09/11/19 2111 09/11/19 2204   BP: (!) 186/95 (!) 183/99 153/85 131/77   BP Location: Right arm      Patient Position:  Lying      Pulse: 55 55 51 (!) 49   Resp: 18 16     Temp:       TempSrc:       SpO2: 95% 97%  96%   Weight:       Height:         Medications   sodium chloride 0.9 % flush 10 mL (not administered)   carvedilol (COREG) tablet 12.5 mg (12.5 mg Oral Given 9/11/19 2010)   CloNIDine (CATAPRES) tablet 0.2 mg (0.2 mg Oral Given 9/11/19 1955)   enalaprilat (VASOTEC) injection 2.5 mg (2.5 mg Intravenous Given 9/11/19 1955)   enalaprilat (VASOTEC) injection 1.25 mg (1.25 mg Intravenous Given 9/11/19 2111)     ECG/EMG Results (last 24 hours)     Procedure Component Value Units Date/Time    ECG 12 Lead [[id number]] Collected:  09/11/19 1848     Updated:  09/11/19 1850    Narrative:       Test Reason : daisy  Blood Pressure : **/** mmHG  Vent. Rate : 065 BPM     Atrial Rate : 065 BPM     P-R Int : 218 ms          QRS Dur : 096 ms      QT Int : 428 ms       P-R-T Axes : 082 -47 093 degrees     QTc Int : 445 ms    Sinus rhythm with 1st degree AV block with frequent premature ventricular  complexes in a pattern of bigeminy  Left axis deviation  Inferior infarct , age undetermined  Cannot rule out Anterior infarct (cited on or before 22-FEB-2019)  ST & T wave abnormality, consider lateral ischemia  Abnormal ECG  When compared with ECG of 22-FEB-2019 11:47,  premature ventricular complexes are now present  T wave inversion more evident in Lateral leads  Confirmed by AMBER AQUINO MD ([id number]) on 9/11/2019 6:50:27 PM    Referred By: md er           Confirmed By:AMBER AQUINO MD        ECG 12 Lead   Final Result   Test Reason : daisy   Blood Pressure : **/** mmHG   Vent. Rate : 065 BPM     Atrial Rate : 065 BPM      P-R Int : 218 ms          QRS Dur : 096 ms       QT Int : 428 ms       P-R-T Axes : 082 -47 093 degrees      QTc Int : 445 ms      Sinus rhythm with 1st degree AV block with frequent premature ventricular   complexes in a pattern of bigeminy   Left axis deviation   Inferior infarct , age undetermined   Cannot rule out Anterior  infarct (cited on or before 22-FEB-2019)   ST & T wave abnormality, consider lateral ischemia   Abnormal ECG   When compared with ECG of 22-FEB-2019 11:47,   premature ventricular complexes are now present   T wave inversion more evident in Lateral leads   Confirmed by AMBER MCINTOSH MD ([id number]) on 9/11/2019 6:50:27 PM      Referred By: md er           Confirmed By:AMBER MCINTOSH MD                          [facility]    Final diagnoses:   Labile hypertension   Ventricular bigeminy   Sinus bradycardia   Cardiomyopathy, unspecified type (CMS/HCC)       Documentation assistance provided by lucia Kumar.  Information recorded by the scribe was done at my direction and has been verified and validated by me.     Gordon Kumar  09/11/19 1947       Amber Mcintosh MD  09/11/19 [id number]

## 2019-09-12 ENCOUNTER — TELEPHONE (OUTPATIENT)
Dept: CARDIOLOGY | Facility: CLINIC | Age: 74
End: 2019-09-12

## 2019-09-12 NOTE — DISCHARGE INSTRUCTIONS
Continue your blood pressure checks.  Let Dr. Rivera's office staff know you were here tonight and that Dr. Acuna was called and wanted you seen as soon as possible to follow-up on your PVCs and difficult to control hypertension.

## 2019-09-12 NOTE — TELEPHONE ENCOUNTER
Pt went to ER yesterday for low HR. Recs in Epic He says he is feeling better today. Per JWRUT on 8/28 he may see the pt sooner, and pt would like a sooner apt to go over medication adjustments. Deepthi contacted to move his December apt up.

## 2019-09-17 ENCOUNTER — TELEPHONE (OUTPATIENT)
Dept: CARDIOLOGY | Facility: CLINIC | Age: 74
End: 2019-09-17

## 2019-09-17 NOTE — TELEPHONE ENCOUNTER
"This patient called with issues of BP. Yesterday he said his BP was 200/119, then it was 201/108 HR in the 40's. Pt never called to let us know, but went to his PCP today because when he tried to take his BP today on two different machines they both read \"error\". At his PCP's office his BP was 180/80 with a pulse of 36. His PCP recommended decreasing his Carvedilol 12.5 mg from twice a day to once a day to help with HR, but BP wasn't addressed. His BP at LOV on 05/28/19 was 172/84 HR 54.  Pt denies any chest pain, SOB, swelling, dizziness. He is taking all medications as prescribed in [facility]. The patient wanted to know your thoughts on decreasing his Carvedilol?   "

## 2019-09-18 NOTE — TELEPHONE ENCOUNTER
I would not decrease his carvedilol.  His lower heart rates have typically correlated with PVCs in a bigeminal pattern which is likely to increase if we lower his carvedilol dose.  I would increase his enalapril to 20 mg p.o. twice daily.  I really think we are going to need to transition him off of amiodarone on to an alternative agent such as Tikosyn.  I suspect he will want to sit down and talk to me about this change I will talk to him further about this potential change at follow-up.

## 2019-09-18 NOTE — TELEPHONE ENCOUNTER
Spoke with pt regarding JWL recommendations above. He is reluctant to increasing his enalapril to 20 mg BID. He would like to come in for a sooner apt to discuss these changes. I feel this is appropriate due to his BP this morning is still elevated at 162/110. Advised pt we will get him scheduled with JWL. Okay per JWL on Tuesday Sep 24 in [place] at 1:30. Pt is agreeable and verbalized understanding.

## 2019-09-24 ENCOUNTER — OFFICE VISIT (OUTPATIENT)
Dept: CARDIOLOGY | Facility: CLINIC | Age: 74
End: 2019-09-24

## 2019-09-24 VITALS
HEIGHT: 68 IN | WEIGHT: 144 LBS | SYSTOLIC BLOOD PRESSURE: 144 MMHG | HEART RATE: 63 BPM | OXYGEN SATURATION: 97 % | BODY MASS INDEX: 21.82 KG/M2 | DIASTOLIC BLOOD PRESSURE: 86 MMHG

## 2019-09-24 DIAGNOSIS — I10 ESSENTIAL HYPERTENSION: ICD-10-CM

## 2019-09-24 DIAGNOSIS — I42.0 DILATED CARDIOMYOPATHY (HCC): ICD-10-CM

## 2019-09-24 DIAGNOSIS — I48.0 PAROXYSMAL ATRIAL FIBRILLATION (HCC): Primary | ICD-10-CM

## 2019-09-24 PROCEDURE — 99214 OFFICE O/P EST MOD 30 MIN: CPT | Performed by: INTERNAL MEDICINE

## 2019-09-24 NOTE — PROGRESS NOTES
[facility]  Office visit  Michael Mallory  1945    There is no work phone number on file.    VISIT DATE:  9/24/2019    PCP: Jerome Jara MD  [address] [place] [id number]    CC:  Chief Complaint   Patient presents with   • Atrial Fibrillation   • Hypertension         ASSESSMENT:   Diagnosis Plan   1. Paroxysmal atrial fibrillation (CMS/HCC)     2. Essential hypertension     3. Dilated cardiomyopathy (CMS/HCC)         PLAN:  Congestive heart failure, chronic, systolic: Secondary to valvular heart disease.  Currently euvolemic and compensated with preserved functional capacity, [place] heart class I.  EF 23% by MUGA, 25% by echo.    Repeat echo today with an EF of approximately 45%.  Continue current dose of ACE inhibitor.  Continue lower dose of carvedilol, 6.25 mg p.o. twice daily, increased fatigue at higher doses.    Hypertension: Goal less than 130/80 mmHg.  Blood pressures predominantly running less than 130/80 mmHg.  Continue current medical therapy.    PVCs: Currently asymptomatic.  Secondary to underlying cardiomyopathy.  Continue beta-blockade.  Amiodarone does not seem to be suppressing arrhythmia well.  Recommend discontinuation to allow for potential alternatives such as Tikosyn.  Also unclear if frequent PVCs may lead to a PVC induced cardiomyopathy on top of his current nonischemic valvular cardiomyopathy.     Paroxysmal atrial fibrillation: Occurred postoperatively.  Has been maintaining sinus rhythm on amiodarone.  Status post left atrial clipping.  Agree that is reasonable to stay off anticoagulation in the setting.  Discussed alternatives to amiodarone such as Tikosyn in order to limit long-term toxicities.    Discontinuing amiodarone.    Mitral regurgitation: Moderate to severe mitral regurgitation status post mitral valve repair.  Currently asymptomatic, [place] heart class I.  Will trend LV systolic function closely, would likely require a  "surgical mitral valve replacement in the future if percutaneous options for therapy are not available.  Repeat echo pending in 2 months.    -2-week ambulatory ECG monitor pending.    Subjective  74-year-old gentleman with long-standing history of mitral valve prolapse with significant mitral regurgitation who underwent mitral valve repair and placement of a Xiong Mitral Valve Annuloplasty Ring (size #35) and NEELA clip at [facility] in August 2018.  Preoperative EF of 65%, immediately postoperative 45%.  Did develop postoperative atrial fibrillation which was treated by amiodarone and elective cardioversion on September 2, 2018 with maintenance of sinus rhythm since that time.  He was on a transient course of Coumadin but eventually decided to go off of this with his primary cardiologist due to dietary limitations.  Follow-up EF assessment by echo and MUGA scan in October and November 2018 revealed an EF of 25 to 30% by echo and an EF of 23% by MUGA scan.  Has had previous intermittent issues with blood pressure lability.  Currently appears to be tolerating his medical therapy which includes low-dose twice daily clonidine and Vasotec.  He weaned his carvedilol down to 12.5 mg p.o. daily and is felt less fatigue.  He continues to have intermittent episodes of asymptomatic bradycardia which are due to PVCs in a bigeminal pattern.  Maintaining an active lifestyle, walks briskly 7 days a week without limitations.  He has had for 5 episodes in which she suddenly feels short of breath and a little lightheaded during activities of daily living over the previous 2 years, to these episodes of occurred within the past couple weeks.  Reviewed most recent twelve-lead EKG.    PHYSICAL EXAMINATION:  Vitals:    09/24/19 1320   BP: 144/86   BP Location: Left arm   Patient Position: Sitting   Pulse: 63   SpO2: 97%   Weight: 65.3 kg (144 lb)   Height: 172.7 cm (68\")     General Appearance:    Alert, cooperative, no distress, " appears stated age   Head:    Normocephalic, without obvious abnormality, atraumatic   Eyes:    conjunctiva/corneas clear   Nose:   Nares normal, septum midline, mucosa normal, no drainage   Throat:   Lips, teeth and gums normal   Neck:   Supple, symmetrical, trachea midline, no carotid    bruit or JVD   Lungs:     Clear to auscultation bilaterally, respirations unlabored   Chest Wall:    No tenderness or deformity    Heart:   Regularly irregular, S1 and S2 normal, III/VI hs murmur apex, rub   or gallop, normal carotid impulse bilaterally without bruit.   Abdomen:     Soft, non-tender   Extremities:   Extremities normal, atraumatic, no cyanosis or edema   Pulses:   2+ and symmetric all extremities   Skin:   Skin color, texture, turgor normal, no rashes or lesions       Diagnostic Data:  Procedures  No results found for: CHLPL, TRIG, HDL, LDLDIRECT  Lab Results   Component Value Date    GLUCOSE 102 (H) 09/11/2019    BUN 17 09/11/2019    CREATININE 1.07 09/11/2019     09/11/2019    K 4.6 09/11/2019     09/11/2019    CO2 30.0 (H) 09/11/2019     No results found for: HGBA1C  Lab Results   Component Value Date    WBC 8.92 09/11/2019    HGB 15.5 09/11/2019    HCT 45.5 09/11/2019     09/11/2019       Allergies  No Known Allergies    Current Medications    Current Outpatient Medications:   •  amiodarone (PACERONE) 100 MG tablet, Take 1 tablet by mouth Daily., Disp: 90 tablet, Rfl: 0  •  aspirin 81 MG EC tablet, Take 81 mg by mouth Daily., Disp: , Rfl:   •  carvedilol (COREG) 25 MG tablet, Take 0.5 tablets by mouth 2 (Two) Times a Day With Meals. (Patient taking differently: Take 12.5 mg by mouth Daily.), Disp: 90 tablet, Rfl: 3  •  CloNIDine (CATAPRES) 0.1 MG tablet, Take 1 tablet by mouth 2 (Two) Times a Day As Needed for High Blood Pressure. > 160/90, Disp: , Rfl:   •  diazePAM (VALIUM) 2 MG tablet, Take 2 mg by mouth 2 (Two) Times a Day As Needed for Anxiety., Disp: , Rfl:   •  enalapril (VASOTEC) 10  MG tablet, Take 1 tablet by mouth 2 (Two) Times a Day., Disp: 180 tablet, Rfl: 3  •  melatonin 1 MG tablet, Take 1.5 mg by mouth At Night As Needed., Disp: , Rfl:           ROS  Review of Systems   Eyes: Negative for visual disturbance.   Cardiovascular: Positive for irregular heartbeat. Negative for chest pain, dyspnea on exertion and palpitations.   Respiratory: Positive for shortness of breath. Negative for cough.          SOCIAL HX  Social History     Socioeconomic History   • Marital status:      Spouse name: Not on file   • Number of children: Not on file   • Years of education: Not on file   • Highest education level: Not on file   Tobacco Use   • Smoking status: Never Smoker   • Smokeless tobacco: Never Used   Substance and Sexual Activity   • Alcohol use: Yes     Comment: rarely   • Drug use: No   • Sexual activity: Defer   Social History Narrative    Caffeine: 1 serving per day.       FAMILY HX  Family History   Problem Relation Age of Onset   • Heart attack Father    • Transient ischemic attack Mother    • Cancer Mother              Howard Rivera III, MD, FACC

## 2019-09-25 RX ORDER — CARVEDILOL 6.25 MG/1
6.25 TABLET ORAL 2 TIMES DAILY
Qty: 180 TABLET | Refills: 3 | Status: SHIPPED | OUTPATIENT
Start: 2019-09-25 | End: 2019-12-04

## 2019-09-25 NOTE — TELEPHONE ENCOUNTER
Pt calling for a new script of the lower dose of Coreg. Per JWRUT GIANCARLO on 09/25/19 he is to continue Coreg 6.25 mg BID. New RX sent to [facility] per pts request.

## 2019-11-26 ENCOUNTER — HOSPITAL ENCOUNTER (OUTPATIENT)
Dept: CARDIOLOGY | Facility: HOSPITAL | Age: 74
Discharge: HOME | End: 2019-11-26
Admitting: INTERNAL MEDICINE

## 2019-11-26 VITALS — BODY MASS INDEX: 21.82 KG/M2 | WEIGHT: 144 LBS | HEIGHT: 68 IN

## 2019-11-26 DIAGNOSIS — I42.0 DILATED CARDIOMYOPATHY (HCC): ICD-10-CM

## 2019-11-26 PROCEDURE — 93306 TTE W/DOPPLER COMPLETE: CPT

## 2019-11-26 PROCEDURE — 93306 TTE W/DOPPLER COMPLETE: CPT | Performed by: INTERNAL MEDICINE

## 2019-11-27 LAB
BH CV ECHO MEAS - AI DEC SLOPE: 206 CM/SEC^2
BH CV ECHO MEAS - AI MAX PG: 100.8 MMHG
BH CV ECHO MEAS - AI MAX VEL: 502 CM/SEC
BH CV ECHO MEAS - AI P1/2T: 713.7 MSEC
BH CV ECHO MEAS - AO MAX PG (FULL): 2.4 MMHG
BH CV ECHO MEAS - AO MAX PG: 5 MMHG
BH CV ECHO MEAS - AO MEAN PG (FULL): 2 MMHG
BH CV ECHO MEAS - AO MEAN PG: 3 MMHG
BH CV ECHO MEAS - AO ROOT AREA (BSA CORRECTED): 2.4
BH CV ECHO MEAS - AO ROOT AREA: 14.5 CM^2
BH CV ECHO MEAS - AO ROOT DIAM: 4.3 CM
BH CV ECHO MEAS - AO V2 MAX: 115 CM/SEC
BH CV ECHO MEAS - AO V2 MEAN: 77.8 CM/SEC
BH CV ECHO MEAS - AO V2 VTI: 24 CM
BH CV ECHO MEAS - ASC AORTA: 3.7 CM
BH CV ECHO MEAS - AVA(I,A): 2.3 CM^2
BH CV ECHO MEAS - AVA(I,D): 2.3 CM^2
BH CV ECHO MEAS - AVA(V,A): 2.4 CM^2
BH CV ECHO MEAS - AVA(V,D): 2.4 CM^2
BH CV ECHO MEAS - BSA(HAYCOCK): 1.8 M^2
BH CV ECHO MEAS - BSA: 1.8 M^2
BH CV ECHO MEAS - BZI_BMI: 22.1 KILOGRAMS/M^2
BH CV ECHO MEAS - BZI_METRIC_HEIGHT: 172 CM
BH CV ECHO MEAS - BZI_METRIC_WEIGHT: 65.3 KG
BH CV ECHO MEAS - EDV(CUBED): 144.7 ML
BH CV ECHO MEAS - EDV(MOD-SP2): 144 ML
BH CV ECHO MEAS - EDV(MOD-SP4): 108 ML
BH CV ECHO MEAS - EDV(TEICH): 132.4 ML
BH CV ECHO MEAS - EF(CUBED): 52.4 %
BH CV ECHO MEAS - EF(MOD-BP): 51.3 %
BH CV ECHO MEAS - EF(MOD-SP2): 53.2 %
BH CV ECHO MEAS - EF(MOD-SP4): 44.5 %
BH CV ECHO MEAS - EF(TEICH): 43.9 %
BH CV ECHO MEAS - ESV(CUBED): 68.9 ML
BH CV ECHO MEAS - ESV(MOD-SP2): 67.4 ML
BH CV ECHO MEAS - ESV(MOD-SP4): 59.9 ML
BH CV ECHO MEAS - ESV(TEICH): 74.2 ML
BH CV ECHO MEAS - FS: 21.9 %
BH CV ECHO MEAS - IVS/LVPW: 0.81
BH CV ECHO MEAS - IVSD: 1.1 CM
BH CV ECHO MEAS - LA DIMENSION: 4.6 CM
BH CV ECHO MEAS - LA/AO: 1.1
BH CV ECHO MEAS - LAD MAJOR: 6.7 CM
BH CV ECHO MEAS - LAT PEAK E' VEL: 9 CM/SEC
BH CV ECHO MEAS - LATERAL E/E' RATIO: 12.8
BH CV ECHO MEAS - LV DIASTOLIC VOL/BSA (35-75): 60.9 ML/M^2
BH CV ECHO MEAS - LV MASS(C)D: 245.4 GRAMS
BH CV ECHO MEAS - LV MASS(C)DI: 138.5 GRAMS/M^2
BH CV ECHO MEAS - LV MAX PG: 2.6 MMHG
BH CV ECHO MEAS - LV MEAN PG: 1 MMHG
BH CV ECHO MEAS - LV SYSTOLIC VOL/BSA (12-30): 33.8 ML/M^2
BH CV ECHO MEAS - LV V1 MAX: 80.6 CM/SEC
BH CV ECHO MEAS - LV V1 MEAN: 51.9 CM/SEC
BH CV ECHO MEAS - LV V1 VTI: 16.2 CM
BH CV ECHO MEAS - LVIDD: 5.3 CM
BH CV ECHO MEAS - LVIDS: 4.1 CM
BH CV ECHO MEAS - LVLD AP2: 8.8 CM
BH CV ECHO MEAS - LVLD AP4: 7.6 CM
BH CV ECHO MEAS - LVLS AP2: 7.8 CM
BH CV ECHO MEAS - LVLS AP4: 7.3 CM
BH CV ECHO MEAS - LVOT AREA (M): 3.5 CM^2
BH CV ECHO MEAS - LVOT AREA: 3.5 CM^2
BH CV ECHO MEAS - LVOT DIAM: 2.1 CM
BH CV ECHO MEAS - LVPWD: 1.3 CM
BH CV ECHO MEAS - MED PEAK E' VEL: 4.6 CM/SEC
BH CV ECHO MEAS - MEDIAL E/E' RATIO: 25.4
BH CV ECHO MEAS - MV A MAX VEL: 46.7 CM/SEC
BH CV ECHO MEAS - MV DEC SLOPE: 635 CM/SEC^2
BH CV ECHO MEAS - MV DEC TIME: 0.18 SEC
BH CV ECHO MEAS - MV E MAX VEL: 116 CM/SEC
BH CV ECHO MEAS - MV E/A: 2.5
BH CV ECHO MEAS - PA ACC TIME: 0.09 SEC
BH CV ECHO MEAS - PA MAX PG: 5.4 MMHG
BH CV ECHO MEAS - PA PR(ACCEL): 38.5 MMHG
BH CV ECHO MEAS - PA V2 MAX: 116 CM/SEC
BH CV ECHO MEAS - PI END-D VEL: 165 CM/SEC
BH CV ECHO MEAS - SI(AO): 196.7 ML/M^2
BH CV ECHO MEAS - SI(CUBED): 42.8 ML/M^2
BH CV ECHO MEAS - SI(LVOT): 31.6 ML/M^2
BH CV ECHO MEAS - SI(MOD-SP2): 43.2 ML/M^2
BH CV ECHO MEAS - SI(MOD-SP4): 27.1 ML/M^2
BH CV ECHO MEAS - SI(TEICH): 32.8 ML/M^2
BH CV ECHO MEAS - SV(AO): 348.5 ML
BH CV ECHO MEAS - SV(CUBED): 75.8 ML
BH CV ECHO MEAS - SV(LVOT): 55.9 ML
BH CV ECHO MEAS - SV(MOD-SP2): 76.6 ML
BH CV ECHO MEAS - SV(MOD-SP4): 48.1 ML
BH CV ECHO MEAS - SV(TEICH): 58.2 ML
BH CV ECHO MEAS - TAPSE (>1.6): 1.5 CM2
BH CV ECHO MEASUREMENTS AVERAGE E/E' RATIO: 17.06
BH CV XLRA - RV BASE: 3.9 CM
BH CV XLRA - RV LENGTH: 7 CM
BH CV XLRA - RV MID: 2.4 CM
BH CV XLRA - TDI S': 9.9 CM/SEC
LEFT ATRIUM VOLUME INDEX: 64.9 ML/M^2
LEFT ATRIUM VOLUME: 115 ML
MAXIMAL PREDICTED HEART RATE: 146 BPM
STRESS TARGET HR: 124 BPM

## 2019-12-04 ENCOUNTER — OFFICE VISIT (OUTPATIENT)
Dept: CARDIOLOGY | Facility: CLINIC | Age: 74
End: 2019-12-04

## 2019-12-04 VITALS
WEIGHT: 148 LBS | DIASTOLIC BLOOD PRESSURE: 100 MMHG | HEART RATE: 66 BPM | SYSTOLIC BLOOD PRESSURE: 160 MMHG | BODY MASS INDEX: 22.43 KG/M2 | HEIGHT: 68 IN

## 2019-12-04 DIAGNOSIS — I42.0 DILATED CARDIOMYOPATHY (HCC): ICD-10-CM

## 2019-12-04 DIAGNOSIS — I48.0 PAROXYSMAL ATRIAL FIBRILLATION (HCC): Primary | ICD-10-CM

## 2019-12-04 DIAGNOSIS — I10 ESSENTIAL HYPERTENSION: ICD-10-CM

## 2019-12-04 DIAGNOSIS — I34.0 MITRAL VALVE INSUFFICIENCY, UNSPECIFIED ETIOLOGY: ICD-10-CM

## 2019-12-04 PROCEDURE — 93000 ELECTROCARDIOGRAM COMPLETE: CPT | Performed by: INTERNAL MEDICINE

## 2019-12-04 PROCEDURE — 99214 OFFICE O/P EST MOD 30 MIN: CPT | Performed by: INTERNAL MEDICINE

## 2019-12-04 RX ORDER — CARVEDILOL 12.5 MG/1
12.5 TABLET ORAL 2 TIMES DAILY
Qty: 180 TABLET | Refills: 3 | Status: SHIPPED | OUTPATIENT
Start: 2019-12-04 | End: 2020-03-24 | Stop reason: SDUPTHER

## 2019-12-04 NOTE — PROGRESS NOTES
[facility]  Office visit  Michael Mallory  1945    There is no work phone number on file.    VISIT DATE:  12/4/2019    PCP: Jerome Jara MD  [address] [place] [id number]    CC:  Chief Complaint   Patient presents with   • Atrial Fibrillation     Cardiac studies and procedures:  October 2017 echo: EF 55 to 60%, grade 2 diastolic dysfunction, mild LVH, mild LV dilation-LVIDd 5.9 cm, LVIDs 3.7 cm, moderate to severe left atrial enlargement, moderate right atrial enlargement, prolapse of P2 segment with severe mitral regurgitation, estimated RVSP 50 to 55 mmHg.    August 2018 [facility]: Mitral valve repair with placement of a 35 mm Xiong annuloplasty ring, exclusion of left atrial appendage.    September 2018 echo [facility]: LVEF 45 ± 5, trivial MR status post mitral valve repair.    October 2018 echo  · The left ventricular cavity is severely dilated. Global left ventricular ejection fraction is estimated at 25-30%.  · Left ventricular wall thickness is consistent with mild concentric hypertrophy.  · Left ventricular diastolic dysfunction (grade II) consistent with pseudonormalization.  · Right ventricular cavity is borderline dilated.  · Moderately reduced right ventricular systolic function noted.  · Left atrial cavity size is moderate-to-severely dilated.  · Moderate mitral valve regurgitation is present  · Mild to moderate tricuspid valve regurgitation is present.  · Mild dilation of the aortic root is present. No dissection or aneurysm is identified.    November 2018 MUGA  #1.  Dilated left ventricle.  #2.  Global left ventricular ejection fraction of 23%.    May 2019 echo  · Calculated EF = 47.0%. Estimated EF appears to be in the range of 46 - 50%  · The left ventricular cavity is borderline dilated.  · Left ventricular diastolic dysfunction (grade II) consistent with pseudonormalization.  · Moderate-to-severe mitral valve regurgitation is present  · Left atrial cavity  size is moderately dilated.  · Calculated right ventricular systolic pressure from tricuspid regurgitation is 35 mmHg.    October 2019 2-week amatory ECG monitor:An abnormal monitor study. Frequent PVCs, 33.5%, which are rarely symptomatic.    November 2019 echo  · Estimated EF appears to be in the range of 46 - 50%.  · Left ventricular wall thickness is consistent with borderline concentric hypertrophy.  · The left ventricular cavity is borderline dilated.  · Left atrial cavity size is severely dilated.  · Moderate-to-severe mitral valve regurgitation is present  · Xiong Mitral Valve Annuloplasty Ring (size #35) and NEELA clip at [facility] in August 2018.    ASSESSMENT:   Diagnosis Plan   1. Paroxysmal atrial fibrillation (CMS/HCC)     2. Mitral valve insufficiency, unspecified etiology     3. Dilated cardiomyopathy (CMS/HCC)     4. Essential hypertension         PLAN:  Congestive heart failure, chronic, systolic: Secondary to valvular heart disease.  Currently euvolemic and compensated with preserved functional capacity, [place] heart class I.  EF 23% by MUGA, 25% by echo.    Repeat echo today with an EF of approximately 45%.  Continue current dose of ACE inhibitor.  Titrating carvedilol.    Hypertension: Goal less than 130/80 mmHg.  Blood pressures predominantly running less than 130/80 mmHg.  Continue current medical therapy.    PVCs: Currently asymptomatic.  Secondary to underlying cardiomyopathy.    Titrating carvedilol, increasing to 12.5 mg p.o. twice daily.  Amiodarone was discontinued due to his potential risk of long-term toxicities, he did have an interval increase in his PVC burden after discontinuation of amiodarone.    Unclear what percentage of his underlying cardiomyopathy is due to history of valvular heart disease versus a developing PVC induced cardiomyopathy but it appears his EF is at least stable on current medical therapy compared to his immediate postop EF after a transient drop to a  letty EF of 23% in November 2018.  EP consultation pending.    Paroxysmal atrial fibrillation: Occurred postoperatively.  Status post left atrial clipping.  Agree that is reasonable to stay off anticoagulation in the setting.    Maintaining sinus rhythm off amiodarone which was discontinued 2 months ago.       Mitral regurgitation: Moderate to severe mitral regurgitation status post mitral valve repair.  Currently asymptomatic, [place] heart class I.  Will trend LV systolic function closely, would likely require a surgical mitral valve replacement in the future if percutaneous options for therapy are not available.  Echo every 6 to 12 months.      Subjective  74-year-old gentleman with long-standing history of mitral valve prolapse with significant mitral regurgitation who underwent mitral valve repair and placement of a Xiong Mitral Valve Annuloplasty Ring (size #35) and NEELA clip at [facility] in August 2018.  Preoperative EF of 65%, immediately postoperative 45%.  Did develop postoperative atrial fibrillation which was treated by amiodarone and elective cardioversion on September 2, 2018 with maintenance of sinus rhythm since that time.  He was on a transient course of Coumadin but eventually decided to go off of this with his primary cardiologist due to dietary limitations.  Follow-up EF assessment by echo and MUGA scan in October and November 2018 revealed an EF of 25 to 30% by echo and an EF of 23% by MUGA scan.  Has had previous intermittent issues with blood pressure lability.  Currently appears to be tolerating his medical therapy which includes low-dose twice daily clonidine and Vasotec.  Maintaining an active lifestyle, walks briskly 7 days a week without limitations.  No further episodes of lightheadedness.  Reviewed results of his recent echo and Holter monitor.  Systolic blood pressures are ranging from 102 to 170 mmHg diastolic blood pressures predominantly in the 80 to 85 mmHg range.  The vast  "majority of his systolic blood pressures are running in the 130s.    PHYSICAL EXAMINATION:  Vitals:    12/04/19 1447   BP: 160/100   BP Location: Left arm   Patient Position: Sitting   Pulse: 66   Weight: 67.1 kg (148 lb)   Height: 172.7 cm (68\")     General Appearance:    Alert, cooperative, no distress, appears stated age   Head:    Normocephalic, without obvious abnormality, atraumatic   Eyes:    conjunctiva/corneas clear   Nose:   Nares normal, septum midline, mucosa normal, no drainage   Throat:   Lips, teeth and gums normal   Neck:   Supple, symmetrical, trachea midline, no carotid    bruit or JVD   Lungs:     Clear to auscultation bilaterally, respirations unlabored   Chest Wall:    No tenderness or deformity    Heart:   Regularly irregular, S1 and S2 normal, III/VI hs murmur apex, rub   or gallop, normal carotid impulse bilaterally without bruit.   Abdomen:     Soft, non-tender   Extremities:   Extremities normal, atraumatic, no cyanosis or edema   Pulses:   2+ and symmetric all extremities   Skin:   Skin color, texture, turgor normal, no rashes or lesions       Diagnostic Data:    ECG 12 Lead  Date/Time: 12/4/2019 2:57 PM  Performed by: Howard Rivera III, MD  Authorized by: Howard Rivera III, MD   Comparison: compared with previous ECG from 9/11/2019  Similar to previous ECG  Rhythm: sinus rhythm  Ectopy: unifocal PVCs  Other findings: poor R wave progression    Clinical impression: abnormal EKG          No results found for: CHLPL, TRIG, HDL, LDLDIRECT  Lab Results   Component Value Date    GLUCOSE 102 (H) 09/11/2019    BUN 17 09/11/2019    CREATININE 1.07 09/11/2019     09/11/2019    K 4.6 09/11/2019     09/11/2019    CO2 30.0 (H) 09/11/2019     No results found for: HGBA1C  Lab Results   Component Value Date    WBC 8.92 09/11/2019    HGB 15.5 09/11/2019    HCT 45.5 09/11/2019     09/11/2019       Allergies  No Known Allergies    Current Medications    Current Outpatient Medications: "   •  aspirin 81 MG EC tablet, Take 81 mg by mouth Daily., Disp: , Rfl:   •  carvedilol (COREG) 6.25 MG tablet, Take 1 tablet by mouth 2 (Two) Times a Day., Disp: 180 tablet, Rfl: 3  •  CloNIDine (CATAPRES) 0.1 MG tablet, Take 1 tablet by mouth 2 (Two) Times a Day As Needed for High Blood Pressure. > 160/90 (Patient taking differently: Take 0.2 mg by mouth 2 (Two) Times a Day. > 160/90), Disp: , Rfl:   •  diazePAM (VALIUM) 2 MG tablet, Take 2 mg by mouth 2 (Two) Times a Day As Needed for Anxiety., Disp: , Rfl:   •  enalapril (VASOTEC) 10 MG tablet, Take 1 tablet by mouth 2 (Two) Times a Day. (Patient taking differently: Take 5 mg by mouth 2 (Two) Times a Day.), Disp: 180 tablet, Rfl: 3          ROS  Review of Systems   Eyes: Negative for visual disturbance.   Cardiovascular: Positive for irregular heartbeat. Negative for chest pain, dyspnea on exertion and palpitations.   Respiratory: Positive for shortness of breath. Negative for cough.          SOCIAL HX  Social History     Socioeconomic History   • Marital status:      Spouse name: Not on file   • Number of children: Not on file   • Years of education: Not on file   • Highest education level: Not on file   Tobacco Use   • Smoking status: Never Smoker   • Smokeless tobacco: Never Used   Substance and Sexual Activity   • Alcohol use: Yes     Comment: rarely   • Drug use: No   • Sexual activity: Defer   Social History Narrative    Caffeine: 1 serving per day.       FAMILY HX  Family History   Problem Relation Age of Onset   • Heart attack Father    • Transient ischemic attack Mother    • Cancer Mother              Howard Rivera III, MD, FACC

## 2020-02-04 NOTE — PROGRESS NOTES
Cardiac Electrophysiology Outpatient Consult Note            [facility]     Consult Note     Michael Mallory  [id number]  02/07/2020       Primary Care Physician: Jerome Jara MD    Referred By: Howard Rivera III, MD    Subjective     Chief Complaint:   Chief Complaint   Patient presents with   • Atrial Fibrillation     Problem List:      Premature Ventricular Contractions  24 hr Holter 2/2019 HR 42-78, avg 50 bpm, VE 6%, SVE < 1%.  14 d Monitor 10/2019 HR , avg 58 bpm, VE 33.5%, SVE < 1%, 5 episodes of nonsustained VT up to 6 beats.    Valvular Heart Disease  Hx of MR with MV repair and placement of a Xiong Mitral Valve Annuloplasty Ring (size #35) and NEELA clip at [facility] in August 2018.  ECHO 10/22/2018 EF 25-30%, mod MR, mild TR, LA 4.5 cm  MUGA 11/12/2018 EF 23%  ECHO 11/26/2019 EF 46-50%, mod to severe MR, LA 4.6 cm  Paroxysmal Atrial Fibrillation  CHADSVASc = 3  Diagnosed 2018 following MVR which was treated with amiodarone and elective cardioversion on September 2, 2018 with maintenance of sinus rhythm since that time.  Not on anticoagulation status post NEELA clip with MVR 8/2018.   Amiodarone discontinued 9/2019 to avoid toxicity with long term therapy  Essential Hypertension  Chronic Systolic Congestive Heart Failure  Palpitations  Anxiety        History of Present Illness:  Mr. Michael Mallory is a 74 year old white male seen in EP referral from Dr. Howard Rivera MD for the evaluation of his frequent premature ventricular contractions.  He has a past medical history significant for valvular heart disease status post open surgical mitral valve repair with left atrial appendage clipping in August 2018.  He is noted to have postoperative atrial fibrillation at this time treated with amiodarone.  His amiodarone was recently discontinued to avoid toxicity with with a noted increase in his PVC burden up to 33.5% and the EP referral.  Upon presentation today  Mr. Mallory denies symptoms related to his PVCs.  He states that they were identified on a routine EKG with cardiac follow-up prompting a cardiac monitor to assess burden.  He denies symptoms of palpitations, chest pain, dizziness, presyncope, or syncope.  He states that following his valve surgery and 2018 he had increased shortness of breath with activity related to a decreased EF that has now recovered on recent echocardiogram.  He is noted to have a history of atrial fibrillation with left atrial appendage clip during his mitral valve repair in 2018.  He denies ever having a transesophageal echo following surgery to evaluate his valve or appendage.  He states that he feels he has been doing much better over the past 6 months.  His blood pressure is now under well controlled on current medications.  He states his average SBP runs 120 to 130 mmHg.  He states he is active and walks 4 miles at a moderate pace 3 to 4 days a week.  He admits to drinking 1 cup of tea per day but denies excess caffeine, EtOH, or other stimulants.      Review of Systems:   Constitutional: No fevers or chills, no recent weight gain or weight loss or fatigue  Eyes: No visual loss, blurred vision, double vision, yellow sclerae.  ENT: No headaches, hearing loss, vertigo, congestion or sore throat.   Cardiovascular: Per HPI  Respiratory: No cough or wheezing, no sputum production, no hematemesis   Gastrointestinal: No abdominal pain, no nausea, vomiting, constipation, diarrhea, melena.   Genitourinary: No dysuria, hematuria or increased frequency.  Musculoskeletal:  No gait disturbance, weakness or joint pain or stiffness  Integumentary: No rashes, urticaria, ulcers or sores.   Neurological: No headache, dizziness, syncope, paralysis, ataxia, no prior CVA/TIA  Psychiatric: No anxiety, or depression  Endocrine: No diaphoresis, cold or heat intolerance. No polyuria or polydipsia.   Hematologic/Lymphatic: No anemia, abnormal bruising or bleeding.  No history of DVT/PE.         Past Medical History:   Past Medical History:   Diagnosis Date   • Anxiety    • Atrial fibrillation (CMS/HCC) 8/22/2016   • Hypertension 8/22/2016   • Mitral valve disorder    • Palpitations 8/22/2016       Past Surgical History:   Past Surgical History:   Procedure Laterality Date   • ATRIAL APPENDAGE EXCLUSION LEFT WITH TRANSESOPHAGEAL ECHOCARDIOGRAM     • HEMORRHOIDECTOMY     • HERNIA REPAIR     • MITRAL VALVE REPAIR/REPLACEMENT         Family History:   Family History   Problem Relation Age of Onset   • Heart attack Father    • Transient ischemic attack Mother    • Cancer Mother        Social History:   Social History     Socioeconomic History   • Marital status:      Spouse name: Not on file   • Number of children: Not on file   • Years of education: Not on file   • Highest education level: Not on file   Tobacco Use   • Smoking status: Never Smoker   • Smokeless tobacco: Never Used   Substance and Sexual Activity   • Alcohol use: Yes     Comment: rarely   • Drug use: No   • Sexual activity: Defer   Social History Narrative    Caffeine: 1 serving per day.       Medications:     Current Outpatient Medications:   •  aspirin 81 MG EC tablet, Take 81 mg by mouth Daily., Disp: , Rfl:   •  carvedilol (COREG) 12.5 MG tablet, Take 1 tablet by mouth 2 (Two) Times a Day. (Patient taking differently: Take 6.25 mg by mouth 2 (Two) Times a Day. 6.25 mg qam and 12.5 qhs), Disp: 180 tablet, Rfl: 3  •  CloNIDine (CATAPRES) 0.1 MG tablet, Take 1 tablet by mouth 2 (Two) Times a Day As Needed for High Blood Pressure. > 160/90 (Patient taking differently: Take 0.15 mg by mouth 2 (Two) Times a Day. 0.15mg qam and 0.1mg qhs), Disp: , Rfl:   •  diazePAM (VALIUM) 2 MG tablet, Take 2 mg by mouth 2 (Two) Times a Day As Needed for Anxiety., Disp: , Rfl:   •  enalapril (VASOTEC) 10 MG tablet, Take 1 tablet by mouth 2 (Two) Times a Day. (Patient taking differently: Take 5 mg by mouth Daily.), Disp: 180  "tablet, Rfl: 3    Allergies:   No Known Allergies    Objective     Physical Exam:  Vital Signs:   Vitals:    02/07/20 0959   BP: 150/90   BP Location: Left arm   Patient Position: Sitting   Pulse: 65   Weight: 66.2 kg (146 lb)   Height: 172.7 cm (68\")       GEN: Well nourished, well-developed, no acute distress  HEENT: Normocephalic, atraumatic, moist mucous membranes  NECK: Supple, no JVD, no thyromegaly, no lymphadenopathy  CARD: Regular rate and rhythm, normal S1 & S2 are present. No gallop or rubs are appreciated.  Systolic murmur at fourth intercostal space left sternal border.  LUNGS: Clear to auscultation bilateraly, normal respiratory effort  ABDOMEN: Soft, nontender, normal bowel sounds  EXTREMITIES: No gross deformities, no clubbing, cyanosis.  No edema   SKIN: Warm, dry  NEURO: No focal deficits, alert and oriented x 3  PSYCHIATRIC: Normal affect and mood, appropriate use of semantics and logic.      Lab Results   Component Value Date    GLUCOSE 102 (H) 09/11/2019    CALCIUM 9.4 09/11/2019     09/11/2019    K 4.6 09/11/2019    CO2 30.0 (H) 09/11/2019     09/11/2019    BUN 17 09/11/2019    CREATININE 1.07 09/11/2019    EGFRIFAFRI >60 09/14/2018    EGFRIFNONA 68 09/11/2019    BCR 15.9 09/11/2019    ANIONGAP 8.0 09/11/2019     Lab Results   Component Value Date    WBC 8.92 09/11/2019    HGB 15.5 09/11/2019    HCT 45.5 09/11/2019    MCV 89.7 09/11/2019     09/11/2019     Lab Results   Component Value Date    INR 3.10 (A) 11/26/2018    INR 4.00 (A) 11/19/2018    INR 3.90 (A) 11/13/2018    PROTIME 16.6 (H) 09/14/2018    PROTIME 17.4 (H) 09/06/2018    PROTIME 13.5 (H) 09/05/2018     Lab Results   Component Value Date    TSH 4.890 (H) 09/11/2019       Cardiac Testing:      I personally viewed and interpreted the patient's EKG/Telemetry/lab data      ECG 12 Lead  Date/Time: 2/7/2020 11:06 AM  Performed by: Abiodun Delgado APRN  Authorized by: Abiodun Delgado APRN   Comparison: " compared with previous ECG from 12/4/2019  Similar to previous ECG  Rhythm: sinus rhythm  BPM: 65  Conduction: incomplete right bundle branch block and 1st degree AV block          Assessment & Plan      Michael was seen today for atrial fibrillation.    Diagnoses and all orders for this visit:    1.  Premature ventricular contractions    2.  Valvular heart disease    3.  Paroxysmal atrial fibrillation (CMS/HCC)      Plan:  Mr Mallory is seen in consultation for the evaluation of his increased premature ventricular contractions.  His EKG today reveals no evidence of PVCs.  He has no symptomology related to his PVCs but does note that he can feel skipped beats on his pulse occasionally with blood pressure checks at home.  At this time we will continue current medication therapy with recommended increase in his Coreg to 12.5 mg twice daily for increased mortality benefit related to his history of systolic heart failure recently noted to recover.  We would also recommend that he have a transesophageal echo in the future to evaluate not only his mitral valve repair but also to evaluate his left atrial appendage resection ( 25% of these are incomplete ) to ensure there is no residual NEELA patency which would favor resumption of anticoagulation.      Follow Up: Follow-up as scheduled with Dr. Rivera and as needed with EP.      Scribed for Wilfred Berry DO by ANA Plasencia. 2/7/2020  12:10 PM      Thank you for allowing me to participate in the care of your patient. Please to not hesitate to contact me with additional questions or concerns.        Wilfred Berry DO, [facility]  Cardiac Electrophysiologist

## 2020-02-07 ENCOUNTER — CONSULT (OUTPATIENT)
Dept: CARDIOLOGY | Facility: CLINIC | Age: 75
End: 2020-02-07

## 2020-02-07 VITALS
DIASTOLIC BLOOD PRESSURE: 90 MMHG | HEART RATE: 65 BPM | WEIGHT: 146 LBS | SYSTOLIC BLOOD PRESSURE: 150 MMHG | HEIGHT: 68 IN | BODY MASS INDEX: 22.13 KG/M2

## 2020-02-07 DIAGNOSIS — I42.0 DILATED CARDIOMYOPATHY (HCC): ICD-10-CM

## 2020-02-07 DIAGNOSIS — Z98.890 H/O MITRAL VALVE REPAIR: ICD-10-CM

## 2020-02-07 DIAGNOSIS — I05.9 MITRAL VALVE DISORDER: ICD-10-CM

## 2020-02-07 DIAGNOSIS — I34.0 MITRAL VALVE INSUFFICIENCY, UNSPECIFIED ETIOLOGY: ICD-10-CM

## 2020-02-07 DIAGNOSIS — I48.0 PAROXYSMAL ATRIAL FIBRILLATION (HCC): ICD-10-CM

## 2020-02-07 DIAGNOSIS — I38 VALVULAR HEART DISEASE: ICD-10-CM

## 2020-02-07 DIAGNOSIS — I49.3 PREMATURE VENTRICULAR CONTRACTIONS: Primary | ICD-10-CM

## 2020-02-07 PROCEDURE — 99204 OFFICE O/P NEW MOD 45 MIN: CPT | Performed by: INTERNAL MEDICINE

## 2020-02-07 PROCEDURE — 93000 ELECTROCARDIOGRAM COMPLETE: CPT | Performed by: INTERNAL MEDICINE

## 2020-03-24 ENCOUNTER — OFFICE VISIT (OUTPATIENT)
Dept: CARDIOLOGY | Facility: CLINIC | Age: 75
End: 2020-03-24

## 2020-03-24 VITALS — WEIGHT: 153.4 LBS | HEIGHT: 68 IN | BODY MASS INDEX: 23.25 KG/M2

## 2020-03-24 DIAGNOSIS — I49.3 PREMATURE VENTRICULAR CONTRACTIONS: ICD-10-CM

## 2020-03-24 DIAGNOSIS — I10 ESSENTIAL HYPERTENSION: ICD-10-CM

## 2020-03-24 DIAGNOSIS — I34.0 MITRAL VALVE INSUFFICIENCY, UNSPECIFIED ETIOLOGY: ICD-10-CM

## 2020-03-24 DIAGNOSIS — I42.0 DILATED CARDIOMYOPATHY (HCC): Primary | ICD-10-CM

## 2020-03-24 PROCEDURE — 99214 OFFICE O/P EST MOD 30 MIN: CPT | Performed by: INTERNAL MEDICINE

## 2020-03-24 PROCEDURE — 93000 ELECTROCARDIOGRAM COMPLETE: CPT | Performed by: INTERNAL MEDICINE

## 2020-03-24 RX ORDER — ENALAPRIL MALEATE 5 MG/1
5 TABLET ORAL DAILY
Qty: 90 TABLET | Refills: 1 | Status: SHIPPED | OUTPATIENT
Start: 2020-03-24 | End: 2020-03-30 | Stop reason: SDUPTHER

## 2020-03-24 RX ORDER — CARVEDILOL 12.5 MG/1
12.5 TABLET ORAL 2 TIMES DAILY
Qty: 180 TABLET | Refills: 3 | Status: SHIPPED | OUTPATIENT
Start: 2020-03-24 | End: 2020-03-30 | Stop reason: SDUPTHER

## 2020-03-24 RX ORDER — CLONIDINE HYDROCHLORIDE 0.1 MG/1
TABLET ORAL
Qty: 225 TABLET | Refills: 1 | Status: SHIPPED | OUTPATIENT
Start: 2020-03-24 | End: 2020-03-30 | Stop reason: SDUPTHER

## 2020-03-24 NOTE — PROGRESS NOTES
[facility]  Office visit  Michael Mallory  1945    There is no work phone number on file.    VISIT DATE:  3/24/2020    PCP: Jerome Jara MD  [address] [place] [id number]    CC:  Chief Complaint   Patient presents with   • PAF     Cardiac studies and procedures:  October 2017 echo: EF 55 to 60%, grade 2 diastolic dysfunction, mild LVH, mild LV dilation-LVIDd 5.9 cm, LVIDs 3.7 cm, moderate to severe left atrial enlargement, moderate right atrial enlargement, prolapse of P2 segment with severe mitral regurgitation, estimated RVSP 50 to 55 mmHg.    August 2018 [facility]: Mitral valve repair with placement of a 35 mm Xiong annuloplasty ring, exclusion of left atrial appendage.    September 2018 echo [facility]: LVEF 45 ± 5, trivial MR status post mitral valve repair.    October 2018 echo  · The left ventricular cavity is severely dilated. Global left ventricular ejection fraction is estimated at 25-30%.  · Left ventricular wall thickness is consistent with mild concentric hypertrophy.  · Left ventricular diastolic dysfunction (grade II) consistent with pseudonormalization.  · Right ventricular cavity is borderline dilated.  · Moderately reduced right ventricular systolic function noted.  · Left atrial cavity size is moderate-to-severely dilated.  · Moderate mitral valve regurgitation is present  · Mild to moderate tricuspid valve regurgitation is present.  · Mild dilation of the aortic root is present. No dissection or aneurysm is identified.    November 2018 MUGA  #1.  Dilated left ventricle.  #2.  Global left ventricular ejection fraction of 23%.    May 2019 echo  · Calculated EF = 47.0%. Estimated EF appears to be in the range of 46 - 50%  · The left ventricular cavity is borderline dilated.  · Left ventricular diastolic dysfunction (grade II) consistent with pseudonormalization.  · Moderate-to-severe mitral valve regurgitation is present  · Left atrial cavity size is moderately  dilated.  · Calculated right ventricular systolic pressure from tricuspid regurgitation is 35 mmHg.    October 2019 2-week amatory ECG monitor:An abnormal monitor study. Frequent PVCs, 33.5%, which are rarely symptomatic.    November 2019 echo  · Estimated EF appears to be in the range of 46 - 50%.  · Left ventricular wall thickness is consistent with borderline concentric hypertrophy.  · The left ventricular cavity is borderline dilated.  · Left atrial cavity size is severely dilated.  · Moderate-to-severe mitral valve regurgitation is present  · Xiong Mitral Valve Annuloplasty Ring (size #35) and NEELA clip at [facility] in August 2018.    ASSESSMENT:   Diagnosis Plan   1. Dilated cardiomyopathy (CMS/HCC)     2. Premature ventricular contractions     3. Mitral valve insufficiency, unspecified etiology         PLAN:  Congestive heart failure, chronic, systolic: Secondary to valvular heart disease.  Currently euvolemic and compensated with preserved functional capacity, [place] heart class I.  EF 23% by MUGA, 25% by echo.    Repeat echo with an EF of approximately 45%.  Continue current dose of ACE inhibitor.  Titrating carvedilol, increasing to 12.5 mg p.o. twice daily..    Hypertension: Goal less than 130/80 mmHg.  Blood pressures predominantly running less than 130/80 mmHg.  Continue current medical therapy.    PVCs: Currently asymptomatic.  Secondary to underlying cardiomyopathy.    Titrating carvedilol, increasing to 12.5 mg p.o. twice daily.  Amiodarone was discontinued due to his potential risk of long-term toxicities, he did have an interval increase in his PVC burden after discontinuation of amiodarone.    Unclear what percentage of his underlying cardiomyopathy is due to history of valvular heart disease versus a developing PVC induced cardiomyopathy but it appears his EF is at least stable on current medical therapy compared to his immediate postop EF after a transient drop to a letty EF of 23% in  November 2018.  EP consultation complete, PVC ablation and antiarrhythmic were not recommended.  Continue titration of beta-blockade.    Paroxysmal atrial fibrillation: Occurred postoperatively.  Status post left atrial clipping.  Agree that is reasonable to stay off anticoagulation in the setting.    Maintaining sinus rhythm off amiodarone which was discontinued 2 months ago.       Mitral regurgitation: Moderate to severe mitral regurgitation status post mitral valve repair.  Currently asymptomatic, [place] heart class I.  Will trend LV systolic function closely, would likely require a surgical mitral valve replacement in the future if percutaneous options for therapy are not available.  Echo every 6 to 12 months.      Subjective  74-year-old gentleman with long-standing history of mitral valve prolapse with significant mitral regurgitation who underwent mitral valve repair and placement of a Xiong Mitral Valve Annuloplasty Ring (size #35) and NEELA clip at [facility] in August 2018.  Preoperative EF of 65%, immediately postoperative 45%.  Did develop postoperative atrial fibrillation which was treated by amiodarone and elective cardioversion on September 2, 2018 with maintenance of sinus rhythm since that time.  He was on a transient course of Coumadin but eventually decided to go off of this with his primary cardiologist due to dietary limitations.  Follow-up EF assessment by echo and MUGA scan in October and November 2018 revealed an EF of 25 to 30% by echo and an EF of 23% by MUGA scan.  Has had previous intermittent issues with blood pressure lability.  Currently appears to be tolerating his medical therapy which includes low-dose twice daily clonidine and Vasotec.  Majority of his systolic blood pressures are staying less than 110 mmHg.  Still walking 1.5 miles every day, takes about 20 minutes.  Reports that he is feeling better now than he has since his mitral valve surgery.  Keeping a regular home  "blood pressure log.    PHYSICAL EXAMINATION:  Vitals:    03/24/20 1122   Weight: 69.6 kg (153 lb 6.4 oz)   Height: 172.7 cm (68\")     General Appearance:    Alert, cooperative, no distress, appears stated age   Head:    Normocephalic, without obvious abnormality, atraumatic   Eyes:    conjunctiva/corneas clear   Nose:   Nares normal, septum midline, mucosa normal, no drainage   Throat:   Lips, teeth and gums normal   Neck:   Supple, symmetrical, trachea midline, no carotid    bruit or JVD   Lungs:     Clear to auscultation bilaterally, respirations unlabored   Chest Wall:    No tenderness or deformity    Heart:   Regularly irregular, S1 and S2 normal, III/VI hs murmur apex, rub   or gallop, normal carotid impulse bilaterally without bruit.   Abdomen:     Soft, non-tender   Extremities:   Extremities normal, atraumatic, no cyanosis or edema   Pulses:   2+ and symmetric all extremities   Skin:   Skin color, texture, turgor normal, no rashes or lesions       Diagnostic Data:    ECG 12 Lead  Date/Time: 3/24/2020 11:48 AM  Performed by: Howard Rivera III, MD  Authorized by: Howard Rivera III, MD   Comparison: compared with previous ECG from 2/7/2020  Comparison to previous ECG: PVCs present  Rhythm: sinus rhythm  Ectopy: unifocal PVCs    Clinical impression: abnormal EKG          No results found for: CHLPL, TRIG, HDL, LDLDIRECT  Lab Results   Component Value Date    GLUCOSE 102 (H) 09/11/2019    BUN 17 09/11/2019    CREATININE 1.07 09/11/2019     09/11/2019    K 4.6 09/11/2019     09/11/2019    CO2 30.0 (H) 09/11/2019     No results found for: HGBA1C  Lab Results   Component Value Date    WBC 8.92 09/11/2019    HGB 15.5 09/11/2019    HCT 45.5 09/11/2019     09/11/2019       Allergies  No Known Allergies    Current Medications    Current Outpatient Medications:   •  aspirin 81 MG EC tablet, Take 81 mg by mouth Daily., Disp: , Rfl:   •  carvedilol (COREG) 12.5 MG tablet, Take 1 tablet by mouth 2 (Two) " Times a Day. (Patient taking differently: Take 6.25 mg by mouth 2 (Two) Times a Day. 6.25 mg qam and 12.5 qhs), Disp: 180 tablet, Rfl: 3  •  CloNIDine (CATAPRES) 0.1 MG tablet, Take 1 tablet by mouth 2 (Two) Times a Day As Needed for High Blood Pressure. > 160/90 (Patient taking differently: Take 0.15 mg by mouth 2 (Two) Times a Day. 0.15mg qam and 0.1mg qhs), Disp: , Rfl:   •  enalapril (VASOTEC) 10 MG tablet, Take 1 tablet by mouth 2 (Two) Times a Day. (Patient taking differently: Take 5 mg by mouth Daily.), Disp: 180 tablet, Rfl: 3          ROS  Review of Systems   Eyes: Negative for visual disturbance.   Cardiovascular: Positive for irregular heartbeat. Negative for chest pain, dyspnea on exertion and palpitations.   Respiratory: Positive for shortness of breath. Negative for cough.          SOCIAL HX  Social History     Socioeconomic History   • Marital status:      Spouse name: Not on file   • Number of children: Not on file   • Years of education: Not on file   • Highest education level: Not on file   Tobacco Use   • Smoking status: Never Smoker   • Smokeless tobacco: Never Used   Substance and Sexual Activity   • Alcohol use: Yes     Comment: rarely   • Drug use: No   • Sexual activity: Defer   Social History Narrative    Caffeine: 1 serving per day.       FAMILY HX  Family History   Problem Relation Age of Onset   • Heart attack Father    • Transient ischemic attack Mother    • Cancer Mother              Howard Rivera III, MD, [facility]

## 2020-03-30 DIAGNOSIS — I10 ESSENTIAL HYPERTENSION: ICD-10-CM

## 2020-03-30 RX ORDER — CARVEDILOL 12.5 MG/1
12.5 TABLET ORAL 2 TIMES DAILY
Qty: 180 TABLET | Refills: 3 | Status: SHIPPED | OUTPATIENT
Start: 2020-03-30 | End: 2021-05-17

## 2020-03-30 RX ORDER — ENALAPRIL MALEATE 5 MG/1
5 TABLET ORAL DAILY
Qty: 90 TABLET | Refills: 1 | Status: SHIPPED | OUTPATIENT
Start: 2020-03-30 | End: 2020-11-17

## 2020-03-30 RX ORDER — CLONIDINE HYDROCHLORIDE 0.1 MG/1
TABLET ORAL
Qty: 225 TABLET | Refills: 1 | Status: SHIPPED | OUTPATIENT
Start: 2020-03-30 | End: 2020-09-14 | Stop reason: SDUPTHER

## 2020-09-14 DIAGNOSIS — I10 ESSENTIAL HYPERTENSION: ICD-10-CM

## 2020-09-14 RX ORDER — CLONIDINE HYDROCHLORIDE 0.1 MG/1
TABLET ORAL
Qty: 225 TABLET | Refills: 1 | Status: SHIPPED | OUTPATIENT
Start: 2020-09-14 | End: 2021-05-27

## 2020-09-25 NOTE — TELEPHONE ENCOUNTER
Dr. Syed present during phone conversation with patient when patient started explaining elevated b/p's of 160-170's systolic etc. V/O per Dr. Syed to tell patient due to him not following his Recommendations, he is to now have family pcp address his b/p meds. Etc. Patient states ok. PH,LPN      ----- Message from Irlanda Cazares MA sent at 2/13/2019 10:40 AM EST -----  Patient left a msg that he wants a call back. He is having problems with his BP, but he didn't leave any readings.      The patient is a very pleasant 26 year old multipara (para 2, with one previous  section followed by one successful ) with prenatal care with myself who is 39 weeks and 5 days gestation. She presents to L&D early this morning with complaints of leakage of fluid per vagina and on presentation to L&D spontaneous rupture of membranes was verified by the nurse. Her cervix was reported to be 5 cm dilated. The fetal heart tracing is category one. Review of the tracing reveals that she is having contractions with a frequency of about once every three minutes. She has an IV. Will make arrangements for the patient to receive a labor epidural. Will anticipate an obstetrical delivery.   Mina Marinelli M.D.

## 2020-10-27 ENCOUNTER — HOSPITAL ENCOUNTER (OUTPATIENT)
Dept: CARDIOLOGY | Facility: HOSPITAL | Age: 75
Discharge: HOME | End: 2020-10-27
Admitting: INTERNAL MEDICINE

## 2020-10-27 ENCOUNTER — OFFICE VISIT (OUTPATIENT)
Dept: CARDIOLOGY | Facility: CLINIC | Age: 75
End: 2020-10-27

## 2020-10-27 VITALS
SYSTOLIC BLOOD PRESSURE: 140 MMHG | HEIGHT: 68 IN | DIASTOLIC BLOOD PRESSURE: 90 MMHG | WEIGHT: 150 LBS | BODY MASS INDEX: 22.73 KG/M2 | TEMPERATURE: 97.7 F | HEART RATE: 68 BPM

## 2020-10-27 VITALS
BODY MASS INDEX: 23.19 KG/M2 | DIASTOLIC BLOOD PRESSURE: 68 MMHG | SYSTOLIC BLOOD PRESSURE: 108 MMHG | HEIGHT: 68 IN | WEIGHT: 153 LBS

## 2020-10-27 DIAGNOSIS — I10 ESSENTIAL HYPERTENSION: ICD-10-CM

## 2020-10-27 DIAGNOSIS — I34.0 MITRAL VALVE INSUFFICIENCY, UNSPECIFIED ETIOLOGY: ICD-10-CM

## 2020-10-27 DIAGNOSIS — I48.0 PAROXYSMAL ATRIAL FIBRILLATION (HCC): Primary | ICD-10-CM

## 2020-10-27 DIAGNOSIS — I42.0 DILATED CARDIOMYOPATHY (HCC): ICD-10-CM

## 2020-10-27 PROCEDURE — 93306 TTE W/DOPPLER COMPLETE: CPT | Performed by: INTERNAL MEDICINE

## 2020-10-27 PROCEDURE — 99214 OFFICE O/P EST MOD 30 MIN: CPT | Performed by: INTERNAL MEDICINE

## 2020-10-27 PROCEDURE — 93306 TTE W/DOPPLER COMPLETE: CPT

## 2020-10-27 NOTE — PROGRESS NOTES
[facility]  Office visit  Michael Mallory  1945    There is no work phone number on file.    VISIT DATE:  10/27/2020    PCP: Jerome Jara MD  [address] [place] [id number]    CC:  Chief Complaint   Patient presents with   • Atrial Fibrillation     Cardiac studies and procedures:  October 2017 echo: EF 55 to 60%, grade 2 diastolic dysfunction, mild LVH, mild LV dilation-LVIDd 5.9 cm, LVIDs 3.7 cm, moderate to severe left atrial enlargement, moderate right atrial enlargement, prolapse of P2 segment with severe mitral regurgitation, estimated RVSP 50 to 55 mmHg.    August 2018 [facility]: Mitral valve repair with placement of a 35 mm Xiong annuloplasty ring, exclusion of left atrial appendage.    September 2018 echo [facility]: LVEF 45 ± 5, trivial MR status post mitral valve repair.    October 2018 echo  · The left ventricular cavity is severely dilated. Global left ventricular ejection fraction is estimated at 25-30%.  · Left ventricular wall thickness is consistent with mild concentric hypertrophy.  · Left ventricular diastolic dysfunction (grade II) consistent with pseudonormalization.  · Right ventricular cavity is borderline dilated.  · Moderately reduced right ventricular systolic function noted.  · Left atrial cavity size is moderate-to-severely dilated.  · Moderate mitral valve regurgitation is present  · Mild to moderate tricuspid valve regurgitation is present.  · Mild dilation of the aortic root is present. No dissection or aneurysm is identified.    November 2018 MUGA  #1.  Dilated left ventricle.  #2.  Global left ventricular ejection fraction of 23%.    May 2019 echo  · Calculated EF = 47.0%. Estimated EF appears to be in the range of 46 - 50%  · The left ventricular cavity is borderline dilated.  · Left ventricular diastolic dysfunction (grade II) consistent with pseudonormalization.  · Moderate-to-severe mitral valve regurgitation is present  · Left atrial cavity  size is moderately dilated.  · Calculated right ventricular systolic pressure from tricuspid regurgitation is 35 mmHg.    October 2019 2-week amatory ECG monitor:An abnormal monitor study. Frequent PVCs, 33.5%, which are rarely symptomatic.    November 2019 echo  · Estimated EF appears to be in the range of 46 - 50%.  · Left ventricular wall thickness is consistent with borderline concentric hypertrophy.  · The left ventricular cavity is borderline dilated.  · Left atrial cavity size is severely dilated.  · Moderate-to-severe mitral valve regurgitation is present  · Xiong Mitral Valve Annuloplasty Ring (size #35) and NEELA clip at [facility] in August 2018.    ASSESSMENT:   Diagnosis Plan   1. Paroxysmal atrial fibrillation (CMS/HCC)     2. Dilated cardiomyopathy (CMS/HCC)     3. Essential hypertension     4. Mitral valve insufficiency, unspecified etiology         PLAN:  Congestive heart failure, chronic, systolic: Secondary to valvular heart disease.  Currently euvolemic and compensated with preserved functional capacity, [place] heart class I.    Vincenzo EF 23% by MUGA, 25% by echo.    Gradually improving on medical therapy, 55 to 60% on echo today.  Currently on maximally tolerated medical therapy, continue current doses of carvedilol 6.25 mg every morning and 12.5 mg every afternoon and enalapril 5 mg p.o. daily.    Hypertension: Goal less than 130/80 mmHg.  Blood pressures predominantly running less than 130/80 mmHg.  Continue current medical therapy.    PVCs: Currently asymptomatic.  Secondary to underlying cardiomyopathy.    Continue beta-blockade.  Amiodarone was discontinued due to his potential risk of long-term toxicities, he did have an interval increase in his PVC burden after discontinuation of amiodarone.    Unclear what percentage of his underlying cardiomyopathy is due to history of valvular heart disease versus a developing PVC induced cardiomyopathy but it appears his EF is improving current  medical therapy compared to his immediate postop EF after a transient drop to a letty EF of 23% in November 2018.  EP consultation complete, PVC ablation and antiarrhythmic were not recommended.      Paroxysmal atrial fibrillation: Occurred postoperatively.  Status post left atrial clipping.  Agree that is reasonable to stay off anticoagulation in the setting.    Maintaining sinus rhythm off amiodarone which was discontinued 2 months ago.       Mitral regurgitation: Moderate to severe mitral regurgitation status post mitral valve repair.  Currently asymptomatic, [place] heart class I.  Will trend LV systolic function closely, would likely require a surgical mitral valve replacement in the future unless valve can be approached percutaneously.  Echo every 6 to 12 months.      Subjective  75-year-old gentleman with long-standing history of mitral valve prolapse with significant mitral regurgitation who underwent mitral valve repair and placement of a Xiong Mitral Valve Annuloplasty Ring (size #35) and NEELA clip at [facility] in August 2018.  Preoperative EF of 65%, immediately postoperative 45%.  Did develop postoperative atrial fibrillation which was treated by amiodarone and elective cardioversion on September 2, 2018 with maintenance of sinus rhythm since that time.  He was on a transient course of Coumadin but eventually decided to go off of this with his primary cardiologist due to dietary limitations.  Follow-up EF assessment by echo and MUGA scan in October and November 2018 revealed an EF of 25 to 30% by echo and an EF of 23% by MUGA scan.  Has had previous intermittent issues with blood pressure lability.  Still walking on a regular basis and doing calisthenics.  Blood pressures at home running less than 128/85 mmHg on current medical therapy.  Developed some mild lightheadedness and potentially increasing palpitations when he was taking 12 and half milligrams of clonidine twice daily.  His symptoms  "improved when he dropped the morning dose to 6.25 mg.  Reviewed echo imaging with patient in the office today.    PHYSICAL EXAMINATION:  Vitals:    10/27/20 1524   BP: 140/90   BP Location: Left arm   Patient Position: Sitting   Pulse: 68   Temp: 97.7 °F (36.5 °C)   Weight: 68 kg (150 lb)   Height: 172.7 cm (68\")     General Appearance:    Alert, cooperative, no distress, appears stated age   Head:    Normocephalic, without obvious abnormality, atraumatic   Eyes:    conjunctiva/corneas clear   Nose:   Nares normal, septum midline, mucosa normal, no drainage   Throat:   Lips, teeth and gums normal   Neck:   Supple, symmetrical, trachea midline, no carotid    bruit or JVD   Lungs:     Clear to auscultation bilaterally, respirations unlabored   Chest Wall:    No tenderness or deformity    Heart:   Regularly irregular, S1 and S2 normal, III/VI hs murmur apex, rub   or gallop, normal carotid impulse bilaterally without bruit.   Abdomen:     Soft, non-tender   Extremities:   Extremities normal, atraumatic, no cyanosis or edema   Pulses:   2+ and symmetric all extremities   Skin:   Skin color, texture, turgor normal, no rashes or lesions       Diagnostic Data:  Procedures  No results found for: CHLPL, TRIG, HDL, LDLDIRECT  Lab Results   Component Value Date    GLUCOSE 102 (H) 09/11/2019    BUN 17 09/11/2019    CREATININE 1.07 09/11/2019     09/11/2019    K 4.6 09/11/2019     09/11/2019    CO2 30.0 (H) 09/11/2019     No results found for: HGBA1C  Lab Results   Component Value Date    WBC 8.92 09/11/2019    HGB 15.5 09/11/2019    HCT 45.5 09/11/2019     09/11/2019       Allergies  No Known Allergies    Current Medications    Current Outpatient Medications:   •  aspirin 81 MG EC tablet, Take 81 mg by mouth Daily., Disp: , Rfl:   •  carvedilol (COREG) 12.5 MG tablet, Take 1 tablet by mouth 2 (Two) Times a Day. (Patient taking differently: Take 6.25 mg by mouth 2 (Two) Times a Day.), Disp: 180 tablet, Rfl: " 3  •  cloNIDine (CATAPRES) 0.1 MG tablet, 0.15mg qam and 0.1mg qhs, Disp: 225 tablet, Rfl: 1  •  enalapril (Vasotec) 5 MG tablet, Take 1 tablet by mouth Daily., Disp: 90 tablet, Rfl: 1          ROS  Review of Systems   Eyes: Negative for visual disturbance.   Cardiovascular: Positive for irregular heartbeat. Negative for chest pain, dyspnea on exertion and palpitations.   Respiratory: Negative for cough and shortness of breath.          SOCIAL HX  Social History     Socioeconomic History   • Marital status:      Spouse name: Not on file   • Number of children: Not on file   • Years of education: Not on file   • Highest education level: Not on file   Tobacco Use   • Smoking status: Never Smoker   • Smokeless tobacco: Never Used   Substance and Sexual Activity   • Alcohol use: Yes     Comment: rarely   • Drug use: No   • Sexual activity: Defer   Social History Narrative    Caffeine: 1 serving per day.       FAMILY HX  Family History   Problem Relation Age of Onset   • Heart attack Father    • Transient ischemic attack Mother    • Cancer Mother              Howard Rivera III, MD, FACC

## 2020-10-28 LAB
ASCENDING AORTA: 3.7 CM
BH CV ECHO MEAS - AO MAX PG (FULL): 1.9 MMHG
BH CV ECHO MEAS - AO MAX PG: 5 MMHG
BH CV ECHO MEAS - AO MEAN PG (FULL): 1.5 MMHG
BH CV ECHO MEAS - AO MEAN PG: 2.9 MMHG
BH CV ECHO MEAS - AO ROOT AREA (BSA CORRECTED): 2.2
BH CV ECHO MEAS - AO ROOT AREA: 12.4 CM^2
BH CV ECHO MEAS - AO ROOT DIAM: 4 CM
BH CV ECHO MEAS - AO V2 MAX: 111.3 CM/SEC
BH CV ECHO MEAS - AO V2 MEAN: 80.4 CM/SEC
BH CV ECHO MEAS - AO V2 VTI: 25.7 CM
BH CV ECHO MEAS - ASC AORTA: 3.7 CM
BH CV ECHO MEAS - AVA(I,A): 2.4 CM^2
BH CV ECHO MEAS - AVA(I,D): 2.4 CM^2
BH CV ECHO MEAS - AVA(V,A): 2.8 CM^2
BH CV ECHO MEAS - AVA(V,D): 2.8 CM^2
BH CV ECHO MEAS - BSA(HAYCOCK): 1.8 M^2
BH CV ECHO MEAS - BSA: 1.8 M^2
BH CV ECHO MEAS - BZI_BMI: 23.3 KILOGRAMS/M^2
BH CV ECHO MEAS - BZI_METRIC_HEIGHT: 172.7 CM
BH CV ECHO MEAS - BZI_METRIC_WEIGHT: 69.4 KG
BH CV ECHO MEAS - EDV(CUBED): 176.4 ML
BH CV ECHO MEAS - EDV(MOD-SP2): 142 ML
BH CV ECHO MEAS - EDV(MOD-SP4): 128 ML
BH CV ECHO MEAS - EDV(TEICH): 154.2 ML
BH CV ECHO MEAS - EF(CUBED): 69.4 %
BH CV ECHO MEAS - EF(MOD-BP): 55.6 %
BH CV ECHO MEAS - EF(MOD-SP2): 58 %
BH CV ECHO MEAS - EF(MOD-SP4): 56.5 %
BH CV ECHO MEAS - EF(TEICH): 60.4 %
BH CV ECHO MEAS - EF_3D-VOL: 60 %
BH CV ECHO MEAS - ESV(CUBED): 53.9 ML
BH CV ECHO MEAS - ESV(MOD-SP2): 59.6 ML
BH CV ECHO MEAS - ESV(MOD-SP4): 55.7 ML
BH CV ECHO MEAS - ESV(TEICH): 61.1 ML
BH CV ECHO MEAS - FS: 32.6 %
BH CV ECHO MEAS - IVS/LVPW: 0.85
BH CV ECHO MEAS - IVSD: 0.96 CM
BH CV ECHO MEAS - LA DIMENSION: 4.7 CM
BH CV ECHO MEAS - LA/AO: 1.2
BH CV ECHO MEAS - LAD MAJOR: 7.1 CM
BH CV ECHO MEAS - LAT PEAK E' VEL: 7.7 CM/SEC
BH CV ECHO MEAS - LATERAL E/E' RATIO: 19.9
BH CV ECHO MEAS - LV DIASTOLIC VOL/BSA (35-75): 70.2 ML/M^2
BH CV ECHO MEAS - LV MASS(C)D: 232.5 GRAMS
BH CV ECHO MEAS - LV MASS(C)DI: 127.5 GRAMS/M^2
BH CV ECHO MEAS - LV MAX PG: 3.1 MMHG
BH CV ECHO MEAS - LV MEAN PG: 1.5 MMHG
BH CV ECHO MEAS - LV SYSTOLIC VOL/BSA (12-30): 30.5 ML/M^2
BH CV ECHO MEAS - LV V1 MAX: 88.1 CM/SEC
BH CV ECHO MEAS - LV V1 MEAN: 55.6 CM/SEC
BH CV ECHO MEAS - LV V1 VTI: 17.3 CM
BH CV ECHO MEAS - LVIDD: 5.6 CM
BH CV ECHO MEAS - LVIDS: 3.8 CM
BH CV ECHO MEAS - LVLD AP2: 8.3 CM
BH CV ECHO MEAS - LVLD AP4: 8.3 CM
BH CV ECHO MEAS - LVLS AP2: 7.9 CM
BH CV ECHO MEAS - LVLS AP4: 7.1 CM
BH CV ECHO MEAS - LVOT AREA (M): 3.5 CM^2
BH CV ECHO MEAS - LVOT AREA: 3.6 CM^2
BH CV ECHO MEAS - LVOT DIAM: 2.1 CM
BH CV ECHO MEAS - LVPWD: 1.1 CM
BH CV ECHO MEAS - MED PEAK E' VEL: 5 CM/SEC
BH CV ECHO MEAS - MEDIAL E/E' RATIO: 30.7
BH CV ECHO MEAS - MR MAX PG: 157 MMHG
BH CV ECHO MEAS - MR MAX VEL: 625.7 CM/SEC
BH CV ECHO MEAS - MR MEAN PG: 94.4 MMHG
BH CV ECHO MEAS - MR MEAN VEL: 455.8 CM/SEC
BH CV ECHO MEAS - MR VTI: 194.9 CM
BH CV ECHO MEAS - MV A MAX VEL: 56.3 CM/SEC
BH CV ECHO MEAS - MV DEC SLOPE: 731.1 CM/SEC^2
BH CV ECHO MEAS - MV DEC TIME: 0.18 SEC
BH CV ECHO MEAS - MV E MAX VEL: 153 CM/SEC
BH CV ECHO MEAS - MV E/A: 2.7
BH CV ECHO MEAS - MV MAX PG: 9 MMHG
BH CV ECHO MEAS - MV MEAN PG: 2.2 MMHG
BH CV ECHO MEAS - MV P1/2T MAX VEL: 136.4 CM/SEC
BH CV ECHO MEAS - MV P1/2T: 54.6 MSEC
BH CV ECHO MEAS - MV V2 MAX: 150.3 CM/SEC
BH CV ECHO MEAS - MV V2 MEAN: 67.8 CM/SEC
BH CV ECHO MEAS - MV V2 VTI: 35.4 CM
BH CV ECHO MEAS - MVA P1/2T LCG: 1.6 CM^2
BH CV ECHO MEAS - MVA(P1/2T): 4 CM^2
BH CV ECHO MEAS - MVA(VTI): 1.7 CM^2
BH CV ECHO MEAS - PA ACC TIME: 0.09 SEC
BH CV ECHO MEAS - PA MAX PG: 6.9 MMHG
BH CV ECHO MEAS - PA PR(ACCEL): 39.8 MMHG
BH CV ECHO MEAS - PA V2 MAX: 131.7 CM/SEC
BH CV ECHO MEAS - PI END-D VEL: 158.1 CM/SEC
BH CV ECHO MEAS - PULM A REVS VEL: 21.3 CM/SEC
BH CV ECHO MEAS - PULM DIAS VEL: 41.7 CM/SEC
BH CV ECHO MEAS - PULM S/D: 1.4
BH CV ECHO MEAS - PULM SYS VEL: 56.8 CM/SEC
BH CV ECHO MEAS - RAP SYSTOLE: 3 MMHG
BH CV ECHO MEAS - RVSP: 23 MMHG
BH CV ECHO MEAS - SI(AO): 174.5 ML/M^2
BH CV ECHO MEAS - SI(CUBED): 67.2 ML/M^2
BH CV ECHO MEAS - SI(LVOT): 33.9 ML/M^2
BH CV ECHO MEAS - SI(MOD-SP2): 45.2 ML/M^2
BH CV ECHO MEAS - SI(MOD-SP4): 39.6 ML/M^2
BH CV ECHO MEAS - SI(TEICH): 51.1 ML/M^2
BH CV ECHO MEAS - SV(AO): 318.3 ML
BH CV ECHO MEAS - SV(CUBED): 122.5 ML
BH CV ECHO MEAS - SV(LVOT): 61.9 ML
BH CV ECHO MEAS - SV(MOD-SP2): 82.4 ML
BH CV ECHO MEAS - SV(MOD-SP4): 72.3 ML
BH CV ECHO MEAS - SV(TEICH): 93.1 ML
BH CV ECHO MEAS - TAPSE (>1.6): 2.1 CM
BH CV ECHO MEAS - TR MAX PG: 20 MMHG
BH CV ECHO MEAS - TR MAX VEL: 221.4 CM/SEC
BH CV ECHO MEASUREMENTS AVERAGE E/E' RATIO: 24.09
BH CV VAS BP RIGHT ARM: NORMAL MMHG
BH CV XLRA - RV BASE: 3.8 CM
BH CV XLRA - RV LENGTH: 6.9 CM
BH CV XLRA - RV MID: 3.2 CM
BH CV XLRA - TDI S': 16 CM/SEC
LEFT ATRIUM VOLUME INDEX: 62 ML/M^2
LEFT ATRIUM VOLUME: 113 ML
MAXIMAL PREDICTED HEART RATE: 145 BPM
STRESS TARGET HR: 123 BPM

## 2020-11-17 RX ORDER — ENALAPRIL MALEATE 5 MG/1
5 TABLET ORAL DAILY
Qty: 90 TABLET | Refills: 0 | Status: SHIPPED | OUTPATIENT
Start: 2020-11-17 | End: 2021-02-15

## 2021-02-15 RX ORDER — ENALAPRIL MALEATE 5 MG/1
TABLET ORAL
Qty: 90 TABLET | Refills: 0 | Status: SHIPPED | OUTPATIENT
Start: 2021-02-15 | End: 2021-05-11

## 2021-05-11 ENCOUNTER — OFFICE VISIT (OUTPATIENT)
Dept: CARDIOLOGY | Facility: CLINIC | Age: 76
End: 2021-05-11

## 2021-05-11 VITALS
OXYGEN SATURATION: 99 % | WEIGHT: 150 LBS | HEIGHT: 68 IN | HEART RATE: 70 BPM | DIASTOLIC BLOOD PRESSURE: 82 MMHG | BODY MASS INDEX: 22.73 KG/M2 | SYSTOLIC BLOOD PRESSURE: 112 MMHG

## 2021-05-11 DIAGNOSIS — I34.0 NONRHEUMATIC MITRAL VALVE REGURGITATION: ICD-10-CM

## 2021-05-11 DIAGNOSIS — I10 ESSENTIAL HYPERTENSION: ICD-10-CM

## 2021-05-11 DIAGNOSIS — I48.0 PAROXYSMAL ATRIAL FIBRILLATION (HCC): ICD-10-CM

## 2021-05-11 DIAGNOSIS — I42.0 DILATED CARDIOMYOPATHY (HCC): Primary | ICD-10-CM

## 2021-05-11 PROCEDURE — 99214 OFFICE O/P EST MOD 30 MIN: CPT | Performed by: INTERNAL MEDICINE

## 2021-05-11 RX ORDER — DIAZEPAM 2 MG/1
2 TABLET ORAL DAILY PRN
COMMUNITY
Start: 2021-04-20 | End: 2022-12-13

## 2021-05-11 RX ORDER — ENALAPRIL MALEATE 2.5 MG/1
2.5 TABLET ORAL DAILY
Qty: 90 TABLET | Refills: 3 | Status: SHIPPED | OUTPATIENT
Start: 2021-05-11 | End: 2022-04-18

## 2021-05-11 NOTE — PROGRESS NOTES
[facility]  Office visit  Michael Mallory  1945    There is no work phone number on file.    VISIT DATE:  5/11/2021    PCP: Jerome Jara MD  [address] [place] [id number]    CC:  Chief Complaint   Patient presents with   • Cardiomyopathy     Cardiac studies and procedures:  October 2017 echo: EF 55 to 60%, grade 2 diastolic dysfunction, mild LVH, mild LV dilation-LVIDd 5.9 cm, LVIDs 3.7 cm, moderate to severe left atrial enlargement, moderate right atrial enlargement, prolapse of P2 segment with severe mitral regurgitation, estimated RVSP 50 to 55 mmHg.    August 2018 [facility]: Mitral valve repair with placement of a 35 mm Xiong annuloplasty ring, exclusion of left atrial appendage.    September 2018 echo [facility]: LVEF 45 ± 5, trivial MR status post mitral valve repair.    October 2018 echo  · The left ventricular cavity is severely dilated. Global left ventricular ejection fraction is estimated at 25-30%.  · Left ventricular wall thickness is consistent with mild concentric hypertrophy.  · Left ventricular diastolic dysfunction (grade II) consistent with pseudonormalization.  · Right ventricular cavity is borderline dilated.  · Moderately reduced right ventricular systolic function noted.  · Left atrial cavity size is moderate-to-severely dilated.  · Moderate mitral valve regurgitation is present  · Mild to moderate tricuspid valve regurgitation is present.  · Mild dilation of the aortic root is present. No dissection or aneurysm is identified.    November 2018 MUGA  #1.  Dilated left ventricle.  #2.  Global left ventricular ejection fraction of 23%.    May 2019 echo  · Calculated EF = 47.0%. Estimated EF appears to be in the range of 46 - 50%  · The left ventricular cavity is borderline dilated.  · Left ventricular diastolic dysfunction (grade II) consistent with pseudonormalization.  · Moderate-to-severe mitral valve regurgitation is present  · Left atrial cavity size is  moderately dilated.  · Calculated right ventricular systolic pressure from tricuspid regurgitation is 35 mmHg.    October 2019 2-week amatory ECG monitor:An abnormal monitor study. Frequent PVCs, 33.5%, which are rarely symptomatic.    November 2019 echo  · Estimated EF appears to be in the range of 46 - 50%.  · Left ventricular wall thickness is consistent with borderline concentric hypertrophy.  · The left ventricular cavity is borderline dilated.  · Left atrial cavity size is severely dilated.  · Moderate-to-severe mitral valve regurgitation is present  · Xiong Mitral Valve Annuloplasty Ring (size #35) and NEELA clip at [facility] in August 2018.    October 2020 echo  · Calculated left ventricular 3D EF = 60% Left ventricular ejection fraction appears to be 56 - 60%. Left ventricular systolic function is normal.  · Left ventricular diastolic function is consistent with (grade III w/high LAP) restrictive pattern.  · The left ventricular cavity is borderline dilated.  · The left atrial cavity is severely dilated.  · Moderate to severe mitral valve regurgitation is present with an anteriorly-directed jet noted.  · Estimated right ventricular systolic pressure from tricuspid regurgitation is normal (<35 mmHg).  · Mild dilation of the ascending aorta is present.    ASSESSMENT:   Diagnosis Plan   1. Dilated cardiomyopathy (CMS/HCC)     2. Nonrheumatic mitral valve regurgitation     3. Paroxysmal atrial fibrillation (CMS/HCC)     4. Essential hypertension         PLAN:  Congestive heart failure, chronic, systolic: Secondary to valvular heart disease.  Currently euvolemic and compensated with preserved functional capacity, [place] heart class I.    Vincenzo EF 23% by MUGA, 25% by echo.    Gradually improving on medical therapy, 55 to 60% on echo today.  Currently on maximally tolerated medical therapy, continue current doses of carvedilol 6.25 mg every morning and 12.5 mg every afternoon and enalapril 2.5 mg p.o.  daily.    Hypertension: Goal less than 130/80 mmHg.  Blood pressures predominantly running less than 130/80 mmHg.  Continue current medical therapy.    PVCs: Currently asymptomatic.  Secondary to underlying cardiomyopathy.    Continue beta-blockade.  Amiodarone was discontinued due to his potential risk of long-term toxicities, he did have an interval increase in his PVC burden after discontinuation of amiodarone.    Unclear what percentage of his underlying cardiomyopathy is due to history of valvular heart disease versus a developing PVC induced cardiomyopathy but it appears his EF is improving current medical therapy compared to his immediate postop EF after a transient drop to a letty EF of 23% in November 2018.  EP consultation complete, PVC ablation and antiarrhythmic were not recommended.      Paroxysmal atrial fibrillation: Occurred postoperatively.  Status post left atrial clipping.  Agree that is reasonable to stay off anticoagulation in the setting.    Maintaining sinus rhythm off amiodarone which was discontinued 2 months ago.       Mitral regurgitation: Moderate to severe mitral regurgitation status post mitral valve repair.  Currently asymptomatic, [place] heart class I.  Will trend LV systolic function closely, would likely require a surgical mitral valve replacement in the future unless valve can be approached percutaneously.  Echo every 12 months.      Subjective  75-year-old gentleman with long-standing history of mitral valve prolapse with significant mitral regurgitation who underwent mitral valve repair and placement of a Xiong Mitral Valve Annuloplasty Ring (size #35) and NEELA clip at [facility] in August 2018.  Preoperative EF of 65%, immediately postoperative 45%.  Did develop postoperative atrial fibrillation which was treated by amiodarone and elective cardioversion on September 2, 2018 with maintenance of sinus rhythm since that time.  He was on a transient course of Coumadin but  eventually decided to go off of this with his primary cardiologist due to dietary limitations.  Follow-up EF assessment by echo and MUGA scan in October and November 2018 revealed an EF of 25 to 30% by echo and an EF of 23% by MUGA scan.  Has had previous intermittent issues with blood pressure lability.  Still walking on a regular basis and doing calisthenics.  Blood pressures at home running less than 120/80 mmHg on current medical therapy.  Tolerating current medical regimen very well.    PHYSICAL EXAMINATION:  There were no vitals filed for this visit.  General Appearance:    Alert, cooperative, no distress, appears stated age   Head:    Normocephalic, without obvious abnormality, atraumatic   Eyes:    conjunctiva/corneas clear   Nose:   Nares normal, septum midline, mucosa normal, no drainage   Throat:   Lips, teeth and gums normal   Neck:   Supple, symmetrical, trachea midline, no carotid    bruit or JVD   Lungs:     Clear to auscultation bilaterally, respirations unlabored   Chest Wall:    No tenderness or deformity    Heart:   Regularly irregular, S1 and S2 normal, III/VI hs murmur apex, rub   or gallop, normal carotid impulse bilaterally without bruit.   Abdomen:     Soft, non-tender   Extremities:   Extremities normal, atraumatic, no cyanosis or edema   Pulses:   2+ and symmetric all extremities   Skin:   Skin color, texture, turgor normal, no rashes or lesions       Diagnostic Data:  Procedures  No results found for: CHLPL, TRIG, HDL, LDLDIRECT  Lab Results   Component Value Date    GLUCOSE 102 (H) 09/11/2019    BUN 17 09/11/2019    CREATININE 1.07 09/11/2019     09/11/2019    K 4.6 09/11/2019     09/11/2019    CO2 30.0 (H) 09/11/2019     No results found for: HGBA1C  Lab Results   Component Value Date    WBC 8.92 09/11/2019    HGB 15.5 09/11/2019    HCT 45.5 09/11/2019     09/11/2019       Allergies  No Known Allergies    Current Medications    Current Outpatient Medications:   •   aspirin 81 MG EC tablet, Take 81 mg by mouth Daily., Disp: , Rfl:   •  carvedilol (COREG) 12.5 MG tablet, Take 1 tablet by mouth 2 (Two) Times a Day. (Patient taking differently: Take 6.25 mg by mouth 2 (Two) Times a Day.), Disp: 180 tablet, Rfl: 3  •  cloNIDine (CATAPRES) 0.1 MG tablet, 0.15mg qam and 0.1mg qhs, Disp: 225 tablet, Rfl: 1  •  diazePAM (VALIUM) 2 MG tablet, Take 2 mg by mouth Daily As Needed., Disp: , Rfl:   •  enalapril (VASOTEC) 5 MG tablet, TAKE 1 TABLET DAILY (NEED LAB WORK FOR FURTHER REFILLS), Disp: 90 tablet, Rfl: 0          ROS  Review of Systems   Eyes: Negative for visual disturbance.   Cardiovascular: Positive for irregular heartbeat. Negative for chest pain, dyspnea on exertion and palpitations.   Respiratory: Negative for cough and shortness of breath.          SOCIAL HX  Social History     Socioeconomic History   • Marital status:      Spouse name: Not on file   • Number of children: Not on file   • Years of education: Not on file   • Highest education level: Not on file   Tobacco Use   • Smoking status: Never Smoker   • Smokeless tobacco: Never Used   Substance and Sexual Activity   • Alcohol use: Yes     Comment: rarely   • Drug use: No   • Sexual activity: Defer       FAMILY HX  Family History   Problem Relation Age of Onset   • Heart attack Father    • Transient ischemic attack Mother    • Cancer Mother              Howard Rivera III, MD, FACC

## 2021-05-17 RX ORDER — CARVEDILOL 12.5 MG/1
TABLET ORAL
Qty: 135 TABLET | Refills: 0 | Status: SHIPPED | OUTPATIENT
Start: 2021-05-17 | End: 2021-08-13

## 2021-05-27 DIAGNOSIS — I10 ESSENTIAL HYPERTENSION: ICD-10-CM

## 2021-05-27 RX ORDER — CLONIDINE HYDROCHLORIDE 0.1 MG/1
TABLET ORAL
Qty: 225 TABLET | Refills: 1 | Status: SHIPPED | OUTPATIENT
Start: 2021-05-27 | End: 2021-11-11

## 2021-08-13 RX ORDER — CARVEDILOL 12.5 MG/1
TABLET ORAL
Qty: 135 TABLET | Refills: 1 | Status: SHIPPED | OUTPATIENT
Start: 2021-08-13 | End: 2022-02-09

## 2021-11-11 DIAGNOSIS — I10 ESSENTIAL HYPERTENSION: ICD-10-CM

## 2021-11-11 RX ORDER — CLONIDINE HYDROCHLORIDE 0.1 MG/1
TABLET ORAL
Qty: 225 TABLET | Refills: 3 | Status: SHIPPED | OUTPATIENT
Start: 2021-11-11 | End: 2022-11-07

## 2021-11-23 ENCOUNTER — HOSPITAL ENCOUNTER (OUTPATIENT)
Dept: CARDIOLOGY | Facility: HOSPITAL | Age: 76
Discharge: HOME | End: 2021-11-23
Admitting: INTERNAL MEDICINE

## 2021-11-23 ENCOUNTER — OFFICE VISIT (OUTPATIENT)
Dept: CARDIOLOGY | Facility: CLINIC | Age: 76
End: 2021-11-23

## 2021-11-23 VITALS
OXYGEN SATURATION: 98 % | HEART RATE: 48 BPM | WEIGHT: 150 LBS | HEIGHT: 67 IN | SYSTOLIC BLOOD PRESSURE: 160 MMHG | BODY MASS INDEX: 23.54 KG/M2 | DIASTOLIC BLOOD PRESSURE: 92 MMHG

## 2021-11-23 VITALS — HEIGHT: 68 IN | BODY MASS INDEX: 22.72 KG/M2 | WEIGHT: 149.91 LBS

## 2021-11-23 DIAGNOSIS — Z98.890 H/O MITRAL VALVE REPAIR: ICD-10-CM

## 2021-11-23 DIAGNOSIS — I34.0 NONRHEUMATIC MITRAL VALVE REGURGITATION: ICD-10-CM

## 2021-11-23 DIAGNOSIS — I42.0 DILATED CARDIOMYOPATHY (HCC): ICD-10-CM

## 2021-11-23 DIAGNOSIS — I48.0 PAROXYSMAL ATRIAL FIBRILLATION (HCC): Primary | ICD-10-CM

## 2021-11-23 LAB
ASCENDING AORTA: 3.5 CM
BH CV ECHO MEAS - AI DEC SLOPE: 192 CM/SEC^2
BH CV ECHO MEAS - AI MAX PG: 104.1 MMHG
BH CV ECHO MEAS - AI MAX VEL: 508.5 CM/SEC
BH CV ECHO MEAS - AI P1/2T: 775.7 MSEC
BH CV ECHO MEAS - AO ROOT AREA (BSA CORRECTED): 2
BH CV ECHO MEAS - AO ROOT AREA: 10.8 CM^2
BH CV ECHO MEAS - AO ROOT DIAM: 3.7 CM
BH CV ECHO MEAS - ASC AORTA: 3.5 CM
BH CV ECHO MEAS - BSA(HAYCOCK): 1.8 M^2
BH CV ECHO MEAS - BSA: 1.8 M^2
BH CV ECHO MEAS - BZI_BMI: 22.8 KILOGRAMS/M^2
BH CV ECHO MEAS - BZI_METRIC_HEIGHT: 172.7 CM
BH CV ECHO MEAS - BZI_METRIC_WEIGHT: 68 KG
BH CV ECHO MEAS - EDV(CUBED): 140.6 ML
BH CV ECHO MEAS - EDV(MOD-SP2): 78 ML
BH CV ECHO MEAS - EDV(MOD-SP4): 101 ML
BH CV ECHO MEAS - EDV(TEICH): 129.5 ML
BH CV ECHO MEAS - EF(CUBED): 59.4 %
BH CV ECHO MEAS - EF(MOD-BP): 56 %
BH CV ECHO MEAS - EF(MOD-SP2): 53.8 %
BH CV ECHO MEAS - EF(MOD-SP4): 55.4 %
BH CV ECHO MEAS - EF(TEICH): 50.6 %
BH CV ECHO MEAS - ESV(CUBED): 57.1 ML
BH CV ECHO MEAS - ESV(MOD-SP2): 36 ML
BH CV ECHO MEAS - ESV(MOD-SP4): 45 ML
BH CV ECHO MEAS - ESV(TEICH): 63.9 ML
BH CV ECHO MEAS - FS: 26 %
BH CV ECHO MEAS - IVS/LVPW: 0.85
BH CV ECHO MEAS - IVSD: 1 CM
BH CV ECHO MEAS - LA DIMENSION: 4.7 CM
BH CV ECHO MEAS - LA/AO: 1.3
BH CV ECHO MEAS - LAD MAJOR: 7.4 CM
BH CV ECHO MEAS - LAT PEAK E' VEL: 11.2 CM/SEC
BH CV ECHO MEAS - LATERAL E/E' RATIO: 10.2
BH CV ECHO MEAS - LV DIASTOLIC VOL/BSA (35-75): 55.8 ML/M^2
BH CV ECHO MEAS - LV MASS(C)D: 226.3 GRAMS
BH CV ECHO MEAS - LV MASS(C)DI: 125.1 GRAMS/M^2
BH CV ECHO MEAS - LV MAX PG: 1.5 MMHG
BH CV ECHO MEAS - LV MEAN PG: 1 MMHG
BH CV ECHO MEAS - LV SYSTOLIC VOL/BSA (12-30): 24.9 ML/M^2
BH CV ECHO MEAS - LV V1 MAX: 61.8 CM/SEC
BH CV ECHO MEAS - LV V1 MEAN: 43.9 CM/SEC
BH CV ECHO MEAS - LV V1 VTI: 12 CM
BH CV ECHO MEAS - LVIDD: 5.2 CM
BH CV ECHO MEAS - LVIDS: 3.9 CM
BH CV ECHO MEAS - LVLD AP2: 7.1 CM
BH CV ECHO MEAS - LVLD AP4: 7.3 CM
BH CV ECHO MEAS - LVLS AP2: 6.8 CM
BH CV ECHO MEAS - LVLS AP4: 6.7 CM
BH CV ECHO MEAS - LVOT AREA (M): 4.2 CM^2
BH CV ECHO MEAS - LVOT AREA: 4.2 CM^2
BH CV ECHO MEAS - LVOT DIAM: 2.3 CM
BH CV ECHO MEAS - LVPWD: 1.2 CM
BH CV ECHO MEAS - MED PEAK E' VEL: 7.2 CM/SEC
BH CV ECHO MEAS - MEDIAL E/E' RATIO: 15.7
BH CV ECHO MEAS - MR MAX PG: 154 MMHG
BH CV ECHO MEAS - MR MAX VEL: 621 CM/SEC
BH CV ECHO MEAS - MR MEAN PG: 102 MMHG
BH CV ECHO MEAS - MR MEAN VEL: 478 CM/SEC
BH CV ECHO MEAS - MR VTI: 201 CM
BH CV ECHO MEAS - MV A MAX VEL: 41.1 CM/SEC
BH CV ECHO MEAS - MV DEC SLOPE: 982 CM/SEC^2
BH CV ECHO MEAS - MV DEC TIME: 0.16 SEC
BH CV ECHO MEAS - MV E MAX VEL: 114 CM/SEC
BH CV ECHO MEAS - MV E/A: 2.8
BH CV ECHO MEAS - MV P1/2T MAX VEL: 149 CM/SEC
BH CV ECHO MEAS - MV P1/2T: 44.4 MSEC
BH CV ECHO MEAS - MVA P1/2T LCG: 1.5 CM^2
BH CV ECHO MEAS - MVA(P1/2T): 5 CM^2
BH CV ECHO MEAS - PA ACC SLOPE: 551.5 CM/SEC^2
BH CV ECHO MEAS - PA ACC TIME: 0.11 SEC
BH CV ECHO MEAS - PA PR(ACCEL): 27.5 MMHG
BH CV ECHO MEAS - RAP SYSTOLE: 10 MMHG
BH CV ECHO MEAS - RVSP: 34 MMHG
BH CV ECHO MEAS - SI(CUBED): 46.2 ML/M^2
BH CV ECHO MEAS - SI(LVOT): 27.6 ML/M^2
BH CV ECHO MEAS - SI(MOD-SP2): 23.2 ML/M^2
BH CV ECHO MEAS - SI(MOD-SP4): 31 ML/M^2
BH CV ECHO MEAS - SI(TEICH): 36.3 ML/M^2
BH CV ECHO MEAS - SV(CUBED): 83.5 ML
BH CV ECHO MEAS - SV(LVOT): 49.9 ML
BH CV ECHO MEAS - SV(MOD-SP2): 42 ML
BH CV ECHO MEAS - SV(MOD-SP4): 56 ML
BH CV ECHO MEAS - SV(TEICH): 65.6 ML
BH CV ECHO MEAS - TAPSE (>1.6): 1.6 CM
BH CV ECHO MEAS - TR MAX PG: 24 MMHG
BH CV ECHO MEAS - TR MAX VEL: 246.7 CM/SEC
BH CV ECHO MEASUREMENTS AVERAGE E/E' RATIO: 12.39
BH CV VAS BP RIGHT ARM: NORMAL MMHG
BH CV XLRA - RV BASE: 4.6 CM
BH CV XLRA - RV LENGTH: 6.2 CM
BH CV XLRA - RV MID: 3.4 CM
BH CV XLRA - TDI S': 15.8 CM/SEC
LEFT ATRIUM VOLUME INDEX: 68 ML/M^2
LEFT ATRIUM VOLUME: 123 ML
MAXIMAL PREDICTED HEART RATE: 144 BPM
STRESS TARGET HR: 122 BPM

## 2021-11-23 PROCEDURE — 93306 TTE W/DOPPLER COMPLETE: CPT | Performed by: INTERNAL MEDICINE

## 2021-11-23 PROCEDURE — 93306 TTE W/DOPPLER COMPLETE: CPT

## 2021-11-23 PROCEDURE — 99214 OFFICE O/P EST MOD 30 MIN: CPT | Performed by: INTERNAL MEDICINE

## 2021-11-23 NOTE — PROGRESS NOTES
[facility]  Office visit  Michael Mallory  1945    There is no work phone number on file.    VISIT DATE:  11/23/2021    PCP: Jerome Jara MD  [address] [place] [id number]    CC:  Chief Complaint   Patient presents with   • Dilated cardiomyopathy (CMS/[facility]     Cardiac studies and procedures:  October 2017 echo: EF 55 to 60%, grade 2 diastolic dysfunction, mild LVH, mild LV dilation-LVIDd 5.9 cm, LVIDs 3.7 cm, moderate to severe left atrial enlargement, moderate right atrial enlargement, prolapse of P2 segment with severe mitral regurgitation, estimated RVSP 50 to 55 mmHg.    August 2018 [facility]: Mitral valve repair with placement of a 35 mm Xiong annuloplasty ring, exclusion of left atrial appendage.    September 2018 echo [facility]: LVEF 45 ± 5, trivial MR status post mitral valve repair.    October 2018 echo  · The left ventricular cavity is severely dilated. Global left ventricular ejection fraction is estimated at 25-30%.  · Left ventricular wall thickness is consistent with mild concentric hypertrophy.  · Left ventricular diastolic dysfunction (grade II) consistent with pseudonormalization.  · Right ventricular cavity is borderline dilated.  · Moderately reduced right ventricular systolic function noted.  · Left atrial cavity size is moderate-to-severely dilated.  · Moderate mitral valve regurgitation is present  · Mild to moderate tricuspid valve regurgitation is present.  · Mild dilation of the aortic root is present. No dissection or aneurysm is identified.    November 2018 MUGA  #1.  Dilated left ventricle.  #2.  Global left ventricular ejection fraction of 23%.    May 2019 echo  · Calculated EF = 47.0%. Estimated EF appears to be in the range of 46 - 50%  · The left ventricular cavity is borderline dilated.  · Left ventricular diastolic dysfunction (grade II) consistent with pseudonormalization.  · Moderate-to-severe mitral valve regurgitation is present  · Left  atrial cavity size is moderately dilated.  · Calculated right ventricular systolic pressure from tricuspid regurgitation is 35 mmHg.    October 2019 2-week amatory ECG monitor:An abnormal monitor study. Frequent PVCs, 33.5%, which are rarely symptomatic.    November 2019 echo  · Estimated EF appears to be in the range of 46 - 50%.  · Left ventricular wall thickness is consistent with borderline concentric hypertrophy.  · The left ventricular cavity is borderline dilated.  · Left atrial cavity size is severely dilated.  · Moderate-to-severe mitral valve regurgitation is present  · Xiong Mitral Valve Annuloplasty Ring (size #35) and NEELA clip at [facility] in August 2018.    October 2020 echo  · Calculated left ventricular 3D EF = 60% Left ventricular ejection fraction appears to be 56 - 60%. Left ventricular systolic function is normal.  · Left ventricular diastolic function is consistent with (grade III w/high LAP) restrictive pattern.  · The left ventricular cavity is borderline dilated.  · The left atrial cavity is severely dilated.  · Moderate to severe mitral valve regurgitation is present with an anteriorly-directed jet noted.  · Estimated right ventricular systolic pressure from tricuspid regurgitation is normal (<35 mmHg).  · Mild dilation of the ascending aorta is present.    ASSESSMENT:   Diagnosis Plan   1. Paroxysmal atrial fibrillation (HCC)     2. Dilated cardiomyopathy (HCC)     3. H/O mitral valve repair     4. Nonrheumatic mitral valve regurgitation         PLAN:  Congestive heart failure, chronic, systolic: Secondary to valvular heart disease.  Currently euvolemic and compensated with preserved functional capacity, [place] heart class I.    Vincenzo EF 23% by MUGA, 25% by echo.   Stable EF on medical therapy, 55 to 60% on echo today.  Currently on maximally tolerated medical therapy, continue current doses of carvedilol 6.25 mg every morning and 12.5 mg every afternoon and enalapril 2.5 mg p.o.  daily.    Hypertension: Goal less than 130/80 mmHg.  Blood pressures predominantly running less than 130/80 mmHg.  Continue current medical therapy. [place] hypertension today.    PVCs: Currently asymptomatic.  Secondary to underlying cardiomyopathy.    Continue beta-blockade.  Amiodarone was discontinued due to his potential risk of long-term toxicities, he did have an interval increase in his PVC burden after discontinuation of amiodarone.    Unclear what percentage of his underlying cardiomyopathy is due to history of valvular heart disease versus a developing PVC induced cardiomyopathy but it appears his EF is improving current medical therapy compared to his immediate postop EF after a transient drop to a letty EF of 23% in November 2018.  EP consultation complete, PVC ablation and antiarrhythmic were not recommended.      Persistent atrial fibrillation: Appear to be in atrial flutter on echo today. Relatively asymptomatic. Off anticoagulation status post left atrial clipping.    Mitral regurgitation: Persistent severe mitral regurgitation status post mitral valve repair.  Currently asymptomatic, [place] heart class I.  Will trend LV systolic function closely, would likely require a surgical mitral valve replacement in the future unless valve can be approached percutaneously.  Echo every 12 months.      Subjective  75-year-old gentleman with long-standing history of mitral valve prolapse with significant mitral regurgitation who underwent mitral valve repair and placement of a Xiong Mitral Valve Annuloplasty Ring (size #35) and NEELA clip at [facility] in August 2018.  Preoperative EF of 65%, immediately postoperative 45%.  Did develop postoperative atrial fibrillation which was treated by amiodarone and elective cardioversion on September 2, 2018 with maintenance of sinus rhythm since that time.  He was on a transient course of Coumadin but eventually decided to go off of this with his primary  "cardiologist due to dietary limitations.  Follow-up EF assessment by echo and MUGA scan in October and November 2018 revealed an EF of 25 to 30% by echo and an EF of 23% by MUGA scan.  Has had previous intermittent issues with blood pressure lability.  Still walking on a regular basis and doing daily calisthenics.  Blood pressures at home running less than 125/80 mmHg on current medical therapy.  Tolerating current medical regimen very well.    PHYSICAL EXAMINATION:  Vitals:    11/23/21 1412   BP: 160/92   BP Location: Left arm   Patient Position: Sitting   Pulse: (!) 48   SpO2: 98%   Weight: 68 kg (150 lb)   Height: 170.2 cm (67\")     General Appearance:    Alert, cooperative, no distress, appears stated age   Head:    Normocephalic, without obvious abnormality, atraumatic   Eyes:    conjunctiva/corneas clear   Nose:   Nares normal, septum midline, mucosa normal, no drainage   Throat:   Lips, teeth and gums normal   Neck:   Supple, symmetrical, trachea midline, no carotid    bruit or JVD   Lungs:     Clear to auscultation bilaterally, respirations unlabored   Chest Wall:    No tenderness or deformity    Heart:   Irregularly irregular, S1 and S2 normal, III/VI hs murmur apex, rub   or gallop, normal carotid impulse bilaterally without bruit.   Abdomen:     Soft, non-tender   Extremities:   Extremities normal, atraumatic, no cyanosis or edema   Pulses:   2+ and symmetric all extremities   Skin:   Skin color, texture, turgor normal, no rashes or lesions       Diagnostic Data:  Procedures  No results found for: CHLPL, TRIG, HDL, LDLDIRECT  Lab Results   Component Value Date    GLUCOSE 102 (H) 09/11/2019    BUN 17 09/11/2019    CREATININE 1.07 09/11/2019     09/11/2019    K 4.6 09/11/2019     09/11/2019    CO2 30.0 (H) 09/11/2019     No results found for: HGBA1C  Lab Results   Component Value Date    WBC 8.92 09/11/2019    HGB 15.5 09/11/2019    HCT 45.5 09/11/2019     09/11/2019       Allergies  No " Known Allergies    Current Medications    Current Outpatient Medications:   •  aspirin 81 MG EC tablet, Take 81 mg by mouth Daily., Disp: , Rfl:   •  carvedilol (COREG) 12.5 MG tablet, TAKE ONE-HALF (1/2) TABLET EVERY MORNING AND 1 TABLET EVERY EVENING, Disp: 135 tablet, Rfl: 1  •  cloNIDine (CATAPRES) 0.1 MG tablet, TAKE ONE AND ONE-HALF TABLETS (0.15 MG) EVERY MORNING AND TAKE ONE TABLET AT BEDTIME, Disp: 225 tablet, Rfl: 3  •  diazePAM (VALIUM) 2 MG tablet, Take 2 mg by mouth Daily As Needed. 1 to 1.5 mg monthly, Disp: , Rfl:   •  enalapril (Vasotec) 2.5 MG tablet, Take 1 tablet by mouth Daily., Disp: 90 tablet, Rfl: 3          ROS  Review of Systems   Eyes: Negative for visual disturbance.   Cardiovascular: Positive for irregular heartbeat. Negative for chest pain, dyspnea on exertion and palpitations.   Respiratory: Negative for cough and shortness of breath.          SOCIAL HX  Social History     Socioeconomic History   • Marital status:    Tobacco Use   • Smoking status: Never Smoker   • Smokeless tobacco: Never Used   Vaping Use   • Vaping Use: Never used   Substance and Sexual Activity   • Alcohol use: Yes     Alcohol/week: 0.0 standard drinks     Comment: Occasionally very small amounts   • Drug use: No   • Sexual activity: Yes     Partners: Female       FAMILY HX  Family History   Problem Relation Age of Onset   • Heart attack Father         46   • Transient ischemic attack Mother    • Cancer Mother              Howard Rivera III, MD, FACC

## 2022-02-09 RX ORDER — CARVEDILOL 12.5 MG/1
TABLET ORAL
Qty: 135 TABLET | Refills: 1 | Status: SHIPPED | OUTPATIENT
Start: 2022-02-09 | End: 2022-08-08

## 2022-04-18 RX ORDER — ENALAPRIL MALEATE 2.5 MG/1
TABLET ORAL
Qty: 90 TABLET | Refills: 0 | Status: SHIPPED | OUTPATIENT
Start: 2022-04-18 | End: 2022-07-18

## 2022-04-26 ENCOUNTER — OFFICE VISIT (OUTPATIENT)
Dept: FAMILY MEDICINE CLINIC | Facility: CLINIC | Age: 77
End: 2022-04-26

## 2022-04-26 VITALS
RESPIRATION RATE: 18 BRPM | DIASTOLIC BLOOD PRESSURE: 92 MMHG | TEMPERATURE: 96.6 F | HEIGHT: 67 IN | WEIGHT: 148 LBS | HEART RATE: 63 BPM | SYSTOLIC BLOOD PRESSURE: 150 MMHG | BODY MASS INDEX: 23.23 KG/M2 | OXYGEN SATURATION: 100 %

## 2022-04-26 DIAGNOSIS — B37.0 ORAL THRUSH: ICD-10-CM

## 2022-04-26 DIAGNOSIS — Z00.00 ROUTINE GENERAL MEDICAL EXAMINATION AT A HEALTH CARE FACILITY: Primary | ICD-10-CM

## 2022-04-26 DIAGNOSIS — Z12.5 SCREENING FOR PROSTATE CANCER: ICD-10-CM

## 2022-04-26 DIAGNOSIS — I10 PRIMARY HYPERTENSION: ICD-10-CM

## 2022-04-26 DIAGNOSIS — I48.0 PAROXYSMAL ATRIAL FIBRILLATION: ICD-10-CM

## 2022-04-26 DIAGNOSIS — I42.0 DILATED CARDIOMYOPATHY: ICD-10-CM

## 2022-04-26 DIAGNOSIS — Z98.890 H/O MITRAL VALVE REPAIR: ICD-10-CM

## 2022-04-26 PROBLEM — I34.1 MITRAL VALVE PROLAPSE: Status: ACTIVE | Noted: 2018-08-26

## 2022-04-26 PROBLEM — F41.9 ANXIETY: Status: ACTIVE | Noted: 2018-09-04

## 2022-04-26 PROCEDURE — 99387 INIT PM E/M NEW PAT 65+ YRS: CPT | Performed by: FAMILY MEDICINE

## 2022-04-26 RX ORDER — IVERMECTIN 3 MG/1
TABLET ORAL
COMMUNITY
End: 2022-04-26

## 2022-04-26 NOTE — PROGRESS NOTES
"Chief Complaint  Establish Care (Needing PCP)    Subjective          Michael Mallory presents to [facility]  Patient is here to get established as a primary care patient and to get an annual physical.  The patient states that he wanted to get some routine blood work because it has not been done in a couple of years.  The patient has hypertension, atrial fibrillation, cardiomyopathy, mitral valve regurgitation s/p repair.  The patient states that he does not need any refills on his medications at this time.  However the patient is concerned that he may have an oral thrush because he is got his white streaks inside his mouth and he has tried every home remedy that he can but it still will not go away.      Objective   Vital Signs:   /92 (BP Location: Left arm, Patient Position: Sitting, Cuff Size: Adult)   Pulse 63   Temp 96.6 °F (35.9 °C) (Temporal)   Resp 18   Ht 170.2 cm (67\")   Wt 67.1 kg (148 lb)   SpO2 100%   BMI 23.18 kg/m²     Physical Exam  Constitutional:       Appearance: Normal appearance.   HENT:      Head: Normocephalic and atraumatic.      Right Ear: Tympanic membrane and ear canal normal.      Left Ear: Tympanic membrane and ear canal normal.      Nose: Nose normal.      Mouth/Throat:      Mouth: Mucous membranes are moist. Oral lesions ( white lesions on the inner cheeks) present.      Pharynx: Oropharynx is clear.   Eyes:      Extraocular Movements: Extraocular movements intact.      Conjunctiva/sclera: Conjunctivae normal.      Pupils: Pupils are equal, round, and reactive to light.   Cardiovascular:      Rate and Rhythm: Normal rate and regular rhythm.      Pulses: Normal pulses.      Heart sounds: Murmur heard.    Systolic murmur is present with a grade of 3/6.  Pulmonary:      Effort: Pulmonary effort is normal.      Breath sounds: Normal breath sounds. No decreased breath sounds, wheezing, rhonchi or rales.   Chest:   Breasts:      Right: No axillary " adenopathy or supraclavicular adenopathy.      Left: No axillary adenopathy or supraclavicular adenopathy.       Abdominal:      General: Bowel sounds are normal.      Tenderness: There is no abdominal tenderness. There is no right CVA tenderness, left CVA tenderness, guarding or rebound.      Hernia: No hernia is present.   Musculoskeletal:      Cervical back: Normal range of motion and neck supple.      Right lower leg: No edema.      Left lower leg: No edema.   Feet:      Right foot:      Skin integrity: Skin integrity normal.      Toenail Condition: Right toenails are normal.      Left foot:      Toenail Condition: Left toenails are normal.   Lymphadenopathy:      Head:      Right side of head: No submental, submandibular, preauricular, posterior auricular or occipital adenopathy.      Left side of head: No submental, submandibular, preauricular, posterior auricular or occipital adenopathy.      Cervical: No cervical adenopathy.      Upper Body:      Right upper body: No supraclavicular or axillary adenopathy.      Left upper body: No supraclavicular or axillary adenopathy.   Skin:     General: Skin is warm.      Capillary Refill: Capillary refill takes less than 2 seconds.      Findings: No rash.      Nails: There is no clubbing.   Neurological:      Mental Status: He is alert and oriented to person, place, and time.      Cranial Nerves: Cranial nerves are intact.      Sensory: Sensation is intact.      Motor: Motor function is intact.      Coordination: Coordination is intact.      Gait: Gait is intact.      Deep Tendon Reflexes: Reflexes are normal and symmetric.   Psychiatric:         Attention and Perception: Attention and perception normal.         Mood and Affect: Mood and affect normal.         Speech: Speech normal.         Behavior: Behavior normal. Behavior is cooperative.         Thought Content: Thought content normal.         Cognition and Memory: Cognition normal.         Judgment: Judgment  normal.        Result Review :                 Assessment and Plan    Diagnoses and all orders for this visit:    1. Routine general medical examination at a health care facility (Primary)  -     CBC & Differential; Future  -     Comprehensive Metabolic Panel; Future  -     Lipid Panel; Future  -     TSH; Future  -     POC Urinalysis Dipstick; Future  -     Hepatitis C Antibody; Future    2. Primary hypertension    3. Paroxysmal atrial fibrillation (HCC)    4. Dilated cardiomyopathy (HCC)    5. Oral thrush  -     nystatin (MYCOSTATIN) 100,000 unit/mL suspension; Swish and swallow 5 mL 4 (Four) Times a Day for 7 days.  Dispense: 140 mL; Refill: 0    6. Screening for prostate cancer  -     PSA Screen; Future    7. H/O mitral valve repair      BMI is within normal parameters. No follow-up required.         Follow Up   Return in about 3 months (around 7/26/2022) for Medicare Wellness.  Patient was given instructions and counseling regarding his condition or for health maintenance advice. Please see specific information pulled into the AVS if appropriate.     The preventive exam has been reviewed in detail.  The patient has been fully counseled on preventative guidelines for vaccines, cancer screenings, and other health maintenance needs.   The patient has been counseled on guidelines for maintaining a lifestyle to promote good health and to minimize chronic diseases.  The patient has been assisted with scheduling these healthcare procedures for the coming year and given a written document of health maintenance and anticipatory guidance for age with the AVS.    The patient will return to the clinic to get his preventative labs as ordered above including PSA screening.  The patient was advised to continue all his regular medications as prescribed.  The patient has a log of his blood pressure at home and all of the readings were below 140/90, he stated that his blood pressure always goes up when he goes to any clinic.  He  was also counseled regarding the importance of diet, exercise and medication compliance.            This document has been electronically signed by Jordi Ruiz MD  April 26, 2022 17:25 EDT

## 2022-04-28 ENCOUNTER — LAB (OUTPATIENT)
Dept: FAMILY MEDICINE CLINIC | Facility: CLINIC | Age: 77
End: 2022-04-28

## 2022-04-28 DIAGNOSIS — Z00.00 ROUTINE GENERAL MEDICAL EXAMINATION AT A HEALTH CARE FACILITY: ICD-10-CM

## 2022-04-28 DIAGNOSIS — Z12.5 SCREENING FOR PROSTATE CANCER: ICD-10-CM

## 2022-04-28 LAB
ALBUMIN SERPL-MCNC: 4.38 G/DL (ref 3.5–5.2)
ALBUMIN/GLOB SERPL: 1.7 G/DL
ALP SERPL-CCNC: 74 U/L (ref 39–117)
ALT SERPL W P-5'-P-CCNC: 19 U/L (ref 1–41)
ANION GAP SERPL CALCULATED.3IONS-SCNC: 8.6 MMOL/L (ref 5–15)
AST SERPL-CCNC: 20 U/L (ref 1–40)
BASOPHILS # BLD AUTO: 0.04 10*3/MM3 (ref 0–0.2)
BASOPHILS NFR BLD AUTO: 0.6 % (ref 0–1.5)
BILIRUB BLD-MCNC: NEGATIVE MG/DL
BILIRUB SERPL-MCNC: 3.2 MG/DL (ref 0–1.2)
BUN SERPL-MCNC: 18 MG/DL (ref 8–23)
BUN/CREAT SERPL: 18.2 (ref 7–25)
CALCIUM SPEC-SCNC: 10 MG/DL (ref 8.6–10.5)
CHLORIDE SERPL-SCNC: 103 MMOL/L (ref 98–107)
CHOLEST SERPL-MCNC: 172 MG/DL (ref 0–200)
CLARITY, POC: CLEAR
CO2 SERPL-SCNC: 26.4 MMOL/L (ref 22–29)
COLOR UR: YELLOW
CREAT SERPL-MCNC: 0.99 MG/DL (ref 0.76–1.27)
DEPRECATED RDW RBC AUTO: 42.9 FL (ref 37–54)
EGFRCR SERPLBLD CKD-EPI 2021: 78.9 ML/MIN/1.73
EOSINOPHIL # BLD AUTO: 0.12 10*3/MM3 (ref 0–0.4)
EOSINOPHIL NFR BLD AUTO: 1.9 % (ref 0.3–6.2)
ERYTHROCYTE [DISTWIDTH] IN BLOOD BY AUTOMATED COUNT: 13.1 % (ref 12.3–15.4)
GLOBULIN UR ELPH-MCNC: 2.6 GM/DL
GLUCOSE SERPL-MCNC: 88 MG/DL (ref 65–99)
GLUCOSE UR STRIP-MCNC: NEGATIVE MG/DL
HCT VFR BLD AUTO: 48.7 % (ref 37.5–51)
HCV AB SER DONR QL: NORMAL
HDLC SERPL-MCNC: 40 MG/DL (ref 40–60)
HGB BLD-MCNC: 16.5 G/DL (ref 13–17.7)
IMM GRANULOCYTES # BLD AUTO: 0.02 10*3/MM3 (ref 0–0.05)
IMM GRANULOCYTES NFR BLD AUTO: 0.3 % (ref 0–0.5)
KETONES UR QL: NEGATIVE
LDLC SERPL CALC-MCNC: 113 MG/DL (ref 0–100)
LDLC/HDLC SERPL: 2.8 {RATIO}
LEUKOCYTE EST, POC: NEGATIVE
LYMPHOCYTES # BLD AUTO: 0.95 10*3/MM3 (ref 0.7–3.1)
LYMPHOCYTES NFR BLD AUTO: 15.1 % (ref 19.6–45.3)
MCH RBC QN AUTO: 30.3 PG (ref 26.6–33)
MCHC RBC AUTO-ENTMCNC: 33.9 G/DL (ref 31.5–35.7)
MCV RBC AUTO: 89.4 FL (ref 79–97)
MONOCYTES # BLD AUTO: 0.56 10*3/MM3 (ref 0.1–0.9)
MONOCYTES NFR BLD AUTO: 8.9 % (ref 5–12)
NEUTROPHILS NFR BLD AUTO: 4.62 10*3/MM3 (ref 1.7–7)
NEUTROPHILS NFR BLD AUTO: 73.2 % (ref 42.7–76)
NITRITE UR-MCNC: NEGATIVE MG/ML
NRBC BLD AUTO-RTO: 0 /100 WBC (ref 0–0.2)
PH UR: 6 [PH] (ref 5–8)
PLATELET # BLD AUTO: 171 10*3/MM3 (ref 140–450)
PMV BLD AUTO: 11.2 FL (ref 6–12)
POTASSIUM SERPL-SCNC: 4.6 MMOL/L (ref 3.5–5.2)
PROT SERPL-MCNC: 7 G/DL (ref 6–8.5)
PROT UR STRIP-MCNC: NEGATIVE MG/DL
RBC # BLD AUTO: 5.45 10*6/MM3 (ref 4.14–5.8)
RBC # UR STRIP: NEGATIVE /UL
SODIUM SERPL-SCNC: 138 MMOL/L (ref 136–145)
SP GR UR: 1.03 (ref 1–1.03)
TRIGL SERPL-MCNC: 101 MG/DL (ref 0–150)
TSH SERPL DL<=0.05 MIU/L-ACNC: 4.33 UIU/ML (ref 0.27–4.2)
UROBILINOGEN UR QL: NORMAL
VLDLC SERPL-MCNC: 19 MG/DL (ref 5–40)
WBC NRBC COR # BLD: 6.31 10*3/MM3 (ref 3.4–10.8)

## 2022-04-28 PROCEDURE — 80061 LIPID PANEL: CPT | Performed by: FAMILY MEDICINE

## 2022-04-28 PROCEDURE — 85025 COMPLETE CBC W/AUTO DIFF WBC: CPT | Performed by: FAMILY MEDICINE

## 2022-04-28 PROCEDURE — 86803 HEPATITIS C AB TEST: CPT | Performed by: FAMILY MEDICINE

## 2022-04-28 PROCEDURE — G0103 PSA SCREENING: HCPCS | Performed by: FAMILY MEDICINE

## 2022-04-28 PROCEDURE — 84443 ASSAY THYROID STIM HORMONE: CPT | Performed by: FAMILY MEDICINE

## 2022-04-28 PROCEDURE — 80053 COMPREHEN METABOLIC PANEL: CPT | Performed by: FAMILY MEDICINE

## 2022-04-28 PROCEDURE — 81002 URINALYSIS NONAUTO W/O SCOPE: CPT | Performed by: FAMILY MEDICINE

## 2022-04-29 LAB — PSA SERPL-MCNC: 3.5 NG/ML (ref 0–4)

## 2022-05-24 ENCOUNTER — OFFICE VISIT (OUTPATIENT)
Dept: CARDIOLOGY | Facility: CLINIC | Age: 77
End: 2022-05-24

## 2022-05-24 VITALS
HEIGHT: 68 IN | WEIGHT: 145 LBS | HEART RATE: 67 BPM | SYSTOLIC BLOOD PRESSURE: 110 MMHG | OXYGEN SATURATION: 97 % | DIASTOLIC BLOOD PRESSURE: 62 MMHG | BODY MASS INDEX: 21.98 KG/M2

## 2022-05-24 DIAGNOSIS — Z98.890 H/O MITRAL VALVE REPAIR: ICD-10-CM

## 2022-05-24 DIAGNOSIS — I42.0 DILATED CARDIOMYOPATHY: ICD-10-CM

## 2022-05-24 DIAGNOSIS — I48.0 PAROXYSMAL ATRIAL FIBRILLATION: Primary | ICD-10-CM

## 2022-05-24 DIAGNOSIS — I05.9 MITRAL VALVE DISORDER: ICD-10-CM

## 2022-05-24 PROCEDURE — 99214 OFFICE O/P EST MOD 30 MIN: CPT | Performed by: INTERNAL MEDICINE

## 2022-05-24 NOTE — PROGRESS NOTES
[facility]  Office visit  Michael Mallory  1945    There is no work phone number on file.    VISIT DATE:  5/24/2022    PCP: Jordi Ruiz MD  [address]  [place] [address]    CC:  Chief Complaint   Patient presents with   • Paroxysmal atrial fibrillation (HCC)     Cardiac studies and procedures:  October 2017 echo: EF 55 to 60%, grade 2 diastolic dysfunction, mild LVH, mild LV dilation-LVIDd 5.9 cm, LVIDs 3.7 cm, moderate to severe left atrial enlargement, moderate right atrial enlargement, prolapse of P2 segment with severe mitral regurgitation, estimated RVSP 50 to 55 mmHg.    August 2018 [facility]: Mitral valve repair with placement of a 35 mm Xiong annuloplasty ring, exclusion of left atrial appendage.    September 2018 echo [facility]: LVEF 45 ± 5, trivial MR status post mitral valve repair.    October 2018 echo  · The left ventricular cavity is severely dilated. Global left ventricular ejection fraction is estimated at 25-30%.  · Left ventricular wall thickness is consistent with mild concentric hypertrophy.  · Left ventricular diastolic dysfunction (grade II) consistent with pseudonormalization.  · Right ventricular cavity is borderline dilated.  · Moderately reduced right ventricular systolic function noted.  · Left atrial cavity size is moderate-to-severely dilated.  · Moderate mitral valve regurgitation is present  · Mild to moderate tricuspid valve regurgitation is present.  · Mild dilation of the aortic root is present. No dissection or aneurysm is identified.    November 2018 MUGA  #1.  Dilated left ventricle.  #2.  Global left ventricular ejection fraction of 23%.    May 2019 echo  · Calculated EF = 47.0%. Estimated EF appears to be in the range of 46 - 50%  · The left ventricular cavity is borderline dilated.  · Left ventricular diastolic dysfunction (grade II) consistent with pseudonormalization.  · Moderate-to-severe mitral valve regurgitation is present  · Left atrial  cavity size is moderately dilated.  · Calculated right ventricular systolic pressure from tricuspid regurgitation is 35 mmHg.    October 2019 2-week amatory ECG monitor:An abnormal monitor study. Frequent PVCs, 33.5%, which are rarely symptomatic.    November 2019 echo  · Estimated EF appears to be in the range of 46 - 50%.  · Left ventricular wall thickness is consistent with borderline concentric hypertrophy.  · The left ventricular cavity is borderline dilated.  · Left atrial cavity size is severely dilated.  · Moderate-to-severe mitral valve regurgitation is present  · Xiong Mitral Valve Annuloplasty Ring (size #35) and NEELA clip at [facility] in August 2018.    October 2020 echo  · Calculated left ventricular 3D EF = 60% Left ventricular ejection fraction appears to be 56 - 60%. Left ventricular systolic function is normal.  · Left ventricular diastolic function is consistent with (grade III w/high LAP) restrictive pattern.  · The left ventricular cavity is borderline dilated.  · The left atrial cavity is severely dilated.  · Moderate to severe mitral valve regurgitation is present with an anteriorly-directed jet noted.  · Estimated right ventricular systolic pressure from tricuspid regurgitation is normal (<35 mmHg).  · Mild dilation of the ascending aorta is present.    ASSESSMENT:   Diagnosis Plan   1. Paroxysmal atrial fibrillation (HCC)     2. Dilated cardiomyopathy (HCC)     3. H/O mitral valve repair     4. Mitral valve disorder         PLAN:  Congestive heart failure, chronic, systolic: Secondary to valvular heart disease.  Currently euvolemic and compensated with preserved functional capacity, [place] heart class I.    Vincenzo EF 23% by MUGA, 25% by echo.   Stable EF on medical therapy, 55 to 60% on echo today.  Currently on maximally tolerated medical therapy, continue current doses of carvedilol 6.25 mg every morning and 12.5 mg every afternoon and enalapril 2.5 mg p.o. daily.    Hypertension:  Goal less than 130/80 mmHg.  Blood pressures predominantly running less than 130/80 mmHg.  Continue current medical therapy.  History of whitecoat hypertension.    PVCs: Currently asymptomatic.  Secondary to underlying cardiomyopathy.    Continue beta-blockade.  Amiodarone was discontinued due to his potential risk of long-term toxicities, he did have an interval increase in his PVC burden after discontinuation of amiodarone.    Unclear what percentage of his underlying cardiomyopathy is due to history of valvular heart disease versus a developing PVC induced cardiomyopathy but it appears his EF is improving current medical therapy compared to his immediate postop EF after a transient drop to a letty EF of 23% in November 2018.  EP consultation complete, PVC ablation and antiarrhythmic were not recommended.      Persistent atrial fibrillation: asymptomatic. Off anticoagulation status post left atrial clipping.    Mitral regurgitation: Persistent moderate to severe mitral regurgitation status post mitral valve repair.  Currently asymptomatic, [place] heart class I.  Will trend LV systolic function closely, would likely require a surgical mitral valve replacement in the future unless valve can be approached percutaneously.  Echo every 12 months.      Subjective  76-year-old gentleman with long-standing history of mitral valve prolapse with significant mitral regurgitation who underwent mitral valve repair and placement of a Xiong Mitral Valve Annuloplasty Ring (size #35) and NEELA clip at [facility] in August 2018.  Preoperative EF of 65%, immediately postoperative 45%.  Did develop postoperative atrial fibrillation which was treated by amiodarone and elective cardioversion on September 2, 2018 with maintenance of sinus rhythm since that time.  He was on a transient course of Coumadin but eventually decided to go off of this with his primary cardiologist due to dietary limitations.  Follow-up EF assessment by  "echo and MUGA scan in October and November 2018 revealed an EF of 25 to 30% by echo and an EF of 23% by MUGA scan.  Has had previous intermittent issues with blood pressure lability.  Still walking on a regular basis and doing daily calisthenics.  Blood pressures at home running less than 125/80 mmHg on current medical therapy.  Tolerating current medical regimen very well.    PHYSICAL EXAMINATION:  Vitals:    05/24/22 1445   BP: 110/62   BP Location: Left arm   Patient Position: Sitting   Pulse: 67   SpO2: 97%   Weight: 65.8 kg (145 lb)   Height: 172.7 cm (68\")     General Appearance:    Alert, cooperative, no distress, appears stated age   Head:    Normocephalic, without obvious abnormality, atraumatic   Eyes:    conjunctiva/corneas clear   Nose:   Nares normal, septum midline, mucosa normal, no drainage   Throat:   Lips, teeth and gums normal   Neck:   Supple, symmetrical, trachea midline, no carotid    bruit or JVD   Lungs:     Clear to auscultation bilaterally, respirations unlabored   Chest Wall:    No tenderness or deformity    Heart:   Irregularly irregular, S1 and S2 normal, III/VI hs murmur apex, rub   or gallop, normal carotid impulse bilaterally without bruit.   Abdomen:     Soft, non-tender   Extremities:   Extremities normal, atraumatic, no cyanosis or edema   Pulses:   2+ and symmetric all extremities   Skin:   Skin color, texture, turgor normal, no rashes or lesions       Diagnostic Data:  Procedures  Lab Results   Component Value Date    TRIG 101 04/28/2022    HDL 40 04/28/2022     Lab Results   Component Value Date    GLUCOSE 88 04/28/2022    BUN 18 04/28/2022    CREATININE 0.99 04/28/2022     04/28/2022    K 4.6 04/28/2022     04/28/2022    CO2 26.4 04/28/2022     No results found for: HGBA1C  Lab Results   Component Value Date    WBC 6.31 04/28/2022    HGB 16.5 04/28/2022    HCT 48.7 04/28/2022     04/28/2022       Allergies  No Known Allergies    Current Medications    Current " Outpatient Medications:   •  aspirin 81 MG EC tablet, Take 81 mg by mouth Daily., Disp: , Rfl:   •  carvedilol (COREG) 12.5 MG tablet, TAKE ONE-HALF (1/2) TABLET EVERY MORNING AND 1 TABLET EVERY EVENING (Patient taking differently: Pt takes 6.25 mg in am and 12.5 mg  in pm), Disp: 135 tablet, Rfl: 1  •  cloNIDine (CATAPRES) 0.1 MG tablet, TAKE ONE AND ONE-HALF TABLETS (0.15 MG) EVERY MORNING AND TAKE ONE TABLET AT BEDTIME (Patient taking differently: TAKE ONE AND ONE-HALF TABLETS (0.15 MG) EVERY MORNING AND TAKE ONE TABLET AT BEDTIME (0.1mg)), Disp: 225 tablet, Rfl: 3  •  enalapril (VASOTEC) 2.5 MG tablet, TAKE 1 TABLET DAILY, Disp: 90 tablet, Rfl: 0  •  diazePAM (VALIUM) 2 MG tablet, Take 2 mg by mouth Daily As Needed. 1 to 1.5 mg monthly. Pt. Reports he very seldom takes  meds, Disp: , Rfl:           ROS  Review of Systems   Eyes: Negative for visual disturbance.   Cardiovascular: Positive for irregular heartbeat. Negative for chest pain, dyspnea on exertion and palpitations.   Respiratory: Negative for cough and shortness of breath.          SOCIAL HX  Social History     Socioeconomic History   • Marital status:    Tobacco Use   • Smoking status: Never Smoker   • Smokeless tobacco: Never Used   Vaping Use   • Vaping Use: Never used   Substance and Sexual Activity   • Alcohol use: Yes     Comment: Insignificant amounts   • Drug use: No   • Sexual activity: Yes     Partners: Female       FAMILY HX  Family History   Problem Relation Age of Onset   • Heart attack Father         46   • Transient ischemic attack Mother    • Cancer Mother              Howard Rivera III, MD, FACC

## 2022-07-18 RX ORDER — ENALAPRIL MALEATE 2.5 MG/1
TABLET ORAL
Qty: 90 TABLET | Refills: 1 | Status: SHIPPED | OUTPATIENT
Start: 2022-07-18 | End: 2022-10-12 | Stop reason: DRUGHIGH

## 2022-07-27 ENCOUNTER — OFFICE VISIT (OUTPATIENT)
Dept: FAMILY MEDICINE CLINIC | Facility: CLINIC | Age: 77
End: 2022-07-27

## 2022-07-27 VITALS
OXYGEN SATURATION: 100 % | RESPIRATION RATE: 18 BRPM | HEART RATE: 89 BPM | BODY MASS INDEX: 22.13 KG/M2 | WEIGHT: 146 LBS | TEMPERATURE: 97.7 F | SYSTOLIC BLOOD PRESSURE: 160 MMHG | DIASTOLIC BLOOD PRESSURE: 90 MMHG | HEIGHT: 68 IN

## 2022-07-27 DIAGNOSIS — I34.1 MITRAL VALVE PROLAPSE: ICD-10-CM

## 2022-07-27 DIAGNOSIS — I49.3 PREMATURE VENTRICULAR CONTRACTIONS: ICD-10-CM

## 2022-07-27 DIAGNOSIS — I10 PRIMARY HYPERTENSION: Primary | ICD-10-CM

## 2022-07-27 DIAGNOSIS — F41.9 ANXIETY: ICD-10-CM

## 2022-07-27 PROCEDURE — 99213 OFFICE O/P EST LOW 20 MIN: CPT | Performed by: FAMILY MEDICINE

## 2022-07-27 NOTE — PROGRESS NOTES
"Chief Complaint  Follow-up (R/t HTN)    Subjective        Michael Mallory presents to [facility]  The patient is a 77 yo male who is here for follow up on his Hypertension, premature ventricular contractions, mitral valve prolapse and anxiety. He states that his BP is staying normal at home and he does not understand why it is elevated today.      Objective   Vital Signs:  /90 (BP Location: Right arm, Patient Position: Sitting, Cuff Size: Adult)   Pulse 89   Temp 97.7 °F (36.5 °C) (Temporal)   Resp 18   Ht 172.7 cm (68\")   Wt 66.2 kg (146 lb)   SpO2 100%   BMI 22.20 kg/m²   Estimated body mass index is 22.2 kg/m² as calculated from the following:    Height as of this encounter: 172.7 cm (68\").    Weight as of this encounter: 66.2 kg (146 lb).    BMI is within normal parameters. No other follow-up for BMI required.      Physical Exam  Vitals and nursing note reviewed.   Constitutional:       General: He is not in acute distress.     Appearance: Normal appearance. He is well-developed and well-groomed.   HENT:      Head: Normocephalic and atraumatic.      Jaw: No tenderness or pain on movement.      Salivary Glands: Right salivary gland is not diffusely enlarged. Left salivary gland is not diffusely enlarged.      Right Ear: Tympanic membrane and ear canal normal.      Left Ear: Tympanic membrane and ear canal normal.      Nose: No congestion or rhinorrhea.      Mouth/Throat:      Mouth: Mucous membranes are moist.      Pharynx: No oropharyngeal exudate or posterior oropharyngeal erythema.   Eyes:      General: Lids are normal. No allergic shiner or scleral icterus.     Conjunctiva/sclera: Conjunctivae normal.      Pupils: Pupils are equal, round, and reactive to light.   Neck:      Thyroid: No thyroid mass, thyromegaly or thyroid tenderness.      Trachea: Trachea normal.   Cardiovascular:      Rate and Rhythm: Normal rate. Rhythm irregularly irregular.  No extrasystoles are " present.     Pulses: Normal pulses.      Heart sounds: Murmur heard.    Systolic murmur is present with a grade of 3/6.  Pulmonary:      Effort: Pulmonary effort is normal. No respiratory distress.      Breath sounds: Normal breath sounds. No decreased breath sounds, wheezing, rhonchi or rales.   Chest:   Breasts:      Right: Normal. No axillary adenopathy or supraclavicular adenopathy.      Left: Normal. No axillary adenopathy or supraclavicular adenopathy.       Abdominal:      General: Bowel sounds are normal.      Palpations: Abdomen is soft.      Tenderness: There is no abdominal tenderness. There is no right CVA tenderness, left CVA tenderness, guarding or rebound.   Musculoskeletal:      Cervical back: Neck supple.      Right lower leg: No edema.      Left lower leg: No edema.   Lymphadenopathy:      Cervical: No cervical adenopathy.      Upper Body:      Right upper body: No supraclavicular or axillary adenopathy.      Left upper body: No supraclavicular or axillary adenopathy.   Skin:     General: Skin is warm.      Nails: There is no clubbing.   Neurological:      General: No focal deficit present.      Mental Status: He is alert and oriented to person, place, and time.      Sensory: Sensation is intact.      Motor: Motor function is intact.      Coordination: Coordination is intact.   Psychiatric:         Attention and Perception: Attention and perception normal.         Mood and Affect: Mood normal.         Speech: Speech normal.         Behavior: Behavior normal. Behavior is cooperative.         Thought Content: Thought content normal.        Result Review :                Assessment and Plan   Diagnoses and all orders for this visit:    1. Primary hypertension (Primary)    2. Premature ventricular contractions    3. Mitral valve prolapse    4. Anxiety             Follow Up   Return in about 6 months (around 1/27/2023) for Medicare Wellness.  Patient was given instructions and counseling regarding his  condition or for health maintenance advice. Please see specific information pulled into the AVS if appropriate.     The patient was advised to continue all his regular medications as prescribed.  He was also advised to check and record his blood pressure daily and if it staying above 140/90 to call his heart doctor, so his blood pressure medicine can be adjusted.  The patient was  counseled regarding the importance of diet, exercise and medication compliance.  The patient will come back in 6 months for his Medicare wellness exam.        This document has been electronically signed by Jordi Ruiz MD  July 27, 2022 16:09 EDT

## 2022-08-08 RX ORDER — CARVEDILOL 12.5 MG/1
TABLET ORAL
Qty: 135 TABLET | Refills: 3 | Status: SHIPPED | OUTPATIENT
Start: 2022-08-08

## 2022-10-12 ENCOUNTER — TELEPHONE (OUTPATIENT)
Dept: CARDIOLOGY | Facility: CLINIC | Age: 77
End: 2022-10-12

## 2022-10-12 RX ORDER — ENALAPRIL MALEATE 5 MG/1
5 TABLET ORAL DAILY
Qty: 90 TABLET | Refills: 1 | Status: SHIPPED | OUTPATIENT
Start: 2022-10-12 | End: 2023-03-27

## 2022-10-12 NOTE — TELEPHONE ENCOUNTER
B/P has been elevated this past week. Averaging @ 867/06. Wants to know if he needs to increase his carvedilol,clonidine or enalapril. Please advise.

## 2022-11-07 DIAGNOSIS — I10 ESSENTIAL HYPERTENSION: ICD-10-CM

## 2022-11-07 RX ORDER — CLONIDINE HYDROCHLORIDE 0.1 MG/1
TABLET ORAL
Qty: 225 TABLET | Refills: 3 | Status: SHIPPED | OUTPATIENT
Start: 2022-11-07

## 2022-12-13 ENCOUNTER — OFFICE VISIT (OUTPATIENT)
Dept: CARDIOLOGY | Facility: CLINIC | Age: 77
End: 2022-12-13

## 2022-12-13 VITALS
WEIGHT: 141.5 LBS | HEART RATE: 56 BPM | BODY MASS INDEX: 21.44 KG/M2 | OXYGEN SATURATION: 96 % | SYSTOLIC BLOOD PRESSURE: 142 MMHG | HEIGHT: 68 IN | DIASTOLIC BLOOD PRESSURE: 90 MMHG

## 2022-12-13 DIAGNOSIS — I05.9 MITRAL VALVE DISORDER: ICD-10-CM

## 2022-12-13 DIAGNOSIS — I42.0 DILATED CARDIOMYOPATHY: ICD-10-CM

## 2022-12-13 DIAGNOSIS — I48.0 PAROXYSMAL ATRIAL FIBRILLATION: Primary | ICD-10-CM

## 2022-12-13 DIAGNOSIS — I34.0 MITRAL VALVE INSUFFICIENCY, UNSPECIFIED ETIOLOGY: ICD-10-CM

## 2022-12-13 DIAGNOSIS — I49.3 PREMATURE VENTRICULAR CONTRACTIONS: ICD-10-CM

## 2022-12-13 PROCEDURE — 99214 OFFICE O/P EST MOD 30 MIN: CPT | Performed by: INTERNAL MEDICINE

## 2022-12-13 RX ORDER — CLOPIDOGREL BISULFATE 75 MG/1
75 TABLET ORAL DAILY
COMMUNITY
Start: 2022-12-05 | End: 2023-01-24

## 2022-12-13 NOTE — PROGRESS NOTES
[facility]  Office visit  Michael Mallory  1945    There is no work phone number on file.    VISIT DATE:  12/13/2022    PCP: Jordi Ruiz MD  [address]  [place] [address]    CC:  Chief Complaint   Patient presents with   • Paroxysmal atrial fibrillation   • Cardiomyopathy     Cardiac studies and procedures:  October 2017 echo: EF 55 to 60%, grade 2 diastolic dysfunction, mild LVH, mild LV dilation-LVIDd 5.9 cm, LVIDs 3.7 cm, moderate to severe left atrial enlargement, moderate right atrial enlargement, prolapse of P2 segment with severe mitral regurgitation, estimated RVSP 50 to 55 mmHg.    August 2018 [facility]: Mitral valve repair with placement of a 35 mm Xiong annuloplasty ring, exclusion of left atrial appendage.    September 2018 echo [facility]: LVEF 45 ± 5, trivial MR status post mitral valve repair.    October 2018 echo  · The left ventricular cavity is severely dilated. Global left ventricular ejection fraction is estimated at 25-30%.  · Left ventricular wall thickness is consistent with mild concentric hypertrophy.  · Left ventricular diastolic dysfunction (grade II) consistent with pseudonormalization.  · Right ventricular cavity is borderline dilated.  · Moderately reduced right ventricular systolic function noted.  · Left atrial cavity size is moderate-to-severely dilated.  · Moderate mitral valve regurgitation is present  · Mild to moderate tricuspid valve regurgitation is present.  · Mild dilation of the aortic root is present. No dissection or aneurysm is identified.    November 2018 MUGA  #1.  Dilated left ventricle.  #2.  Global left ventricular ejection fraction of 23%.    May 2019 echo  · Calculated EF = 47.0%. Estimated EF appears to be in the range of 46 - 50%  · The left ventricular cavity is borderline dilated.  · Left ventricular diastolic dysfunction (grade II) consistent with pseudonormalization.  · Moderate-to-severe mitral valve regurgitation is  present  · Left atrial cavity size is moderately dilated.  · Calculated right ventricular systolic pressure from tricuspid regurgitation is 35 mmHg.    October 2019 2-week amatory ECG monitor:An abnormal monitor study. Frequent PVCs, 33.5%, which are rarely symptomatic.    November 2019 echo  · Estimated EF appears to be in the range of 46 - 50%.  · Left ventricular wall thickness is consistent with borderline concentric hypertrophy.  · The left ventricular cavity is borderline dilated.  · Left atrial cavity size is severely dilated.  · Moderate-to-severe mitral valve regurgitation is present  · Xiong Mitral Valve Annuloplasty Ring (size #35) and NEELA clip at [facility] in August 2018.    October 2020 echo  · Calculated left ventricular 3D EF = 60% Left ventricular ejection fraction appears to be 56 - 60%. Left ventricular systolic function is normal.  · Left ventricular diastolic function is consistent with (grade III w/high LAP) restrictive pattern.  · The left ventricular cavity is borderline dilated.  · The left atrial cavity is severely dilated.  · Moderate to severe mitral valve regurgitation is present with an anteriorly-directed jet noted.  · Estimated right ventricular systolic pressure from tricuspid regurgitation is normal (<35 mmHg).  · Mild dilation of the ascending aorta is present.    November 2021 TTE  • Left ventricular ejection fraction appears to be 56 - 60%. Left ventricular systolic function is normal.  • Left ventricular wall thickness is consistent with borderline concentric hypertrophy.  • Left atrial volume is severely increased.  • Status post mitral valve repair with 35 mm annuloplasty ring.  • Severe mitral valve regurgitation is present.    December 2022 CT head neck  Apparent focal severe stenosis of mid intradural left vertebral artery.  Mild to moderate stenosis of distal intradural right vertebral artery. Mild stenosis of proximal basilar artery.     Moderate stenosis of right  and mild stenosis of left supraclinoid internal carotid arteries. Mild to moderate segmental narrowing of proximal left A2 segment. Otherwise no definite large vessel high-grade stenosis or occlusion.     Crescentic mild hyperdensity projection along paraclinoid left internal carotid artery, probably representing a calcific plaque. Apparent adjacent subtle projection in paraclinoid left ICA measuring approximately 2 to 2.5 mm which may represent ulcerated plaque, although a shallow small aneurysm is also possible.     ASSESSMENT:   Diagnosis Plan   1. Paroxysmal atrial fibrillation (HCC)        2. Dilated cardiomyopathy (HCC)        3. Mitral valve disorder        4. Premature ventricular contractions            PLAN:  Congestive heart failure, chronic, systolic: Secondary to valvular heart disease.  Currently euvolemic and compensated with preserved functional capacity, [place] heart class I.    Letty EF 23% by MUGA, 25% by echo.   Stable EF on medical therapy, 55 to 60% on echo today.  Currently on maximally tolerated medical therapy, continue current doses of carvedilol 6.25 mg every morning and 12.5 mg every afternoon and enalapril 2.5 mg p.o. daily.    Hypertension: Goal less than 130/80 mmHg.  Blood pressures predominantly running less than 130/80 mmHg.  Continue current medical therapy.  History of whitecoat hypertension.    PVCs: Currently asymptomatic.  Secondary to underlying cardiomyopathy.    Continue beta-blockade.  Amiodarone was discontinued due to his potential risk of long-term toxicities, he did have an interval increase in his PVC burden after discontinuation of amiodarone.    Unclear what percentage of his underlying cardiomyopathy is due to history of valvular heart disease versus a developing PVC induced cardiomyopathy but it appears his EF is improving current medical therapy compared to his immediate postop EF after a transient drop to a letty EF of 23% in November 2018.  EP consultation  complete, PVC ablation and antiarrhythmic were not recommended.      Persistent atrial fibrillation: asymptomatic. Off anticoagulation status post left atrial clipping.    Mitral regurgitation: Persistent moderate to severe mitral regurgitation status post mitral valve repair.  Currently asymptomatic, [place] heart class I.  Will trend LV systolic function closely, would likely require a surgical mitral valve replacement in the future unless valve can be approached percutaneously.  Echo every 12 months, repeat pending.    Hyperlipidemia: Goal LDL less than 70.  Currently refusing statin therapy.  Continue dietary and lifestyle modification.    History of CVA, recent TIA: Has completed dual antiplatelet therapy.  Afterload well controlled based on home blood pressure monitoring.  I discussed statin therapy.  Repeat echo pending.  Upcoming follow-up with neurology.    Subjective  76-year-old gentleman with long-standing history of mitral valve prolapse with significant mitral regurgitation who underwent mitral valve repair and placement of a Xiong Mitral Valve Annuloplasty Ring (size #35) and NEELA clip at [facility] in August 2018.  Preoperative EF of 65%, immediately postoperative 45%.  Did develop postoperative atrial fibrillation which was treated by amiodarone and elective cardioversion on September 2, 2018 with maintenance of sinus rhythm since that time.  He was on a transient course of Coumadin but eventually decided to go off of this with his primary cardiologist due to dietary limitations.  Follow-up EF assessment by echo and MUGA scan in October and November 2018 revealed an EF of 25 to 30% by echo and an EF of 23% by MUGA scan.  Has had previous intermittent issues with blood pressure lability.  Still walking on a regular basis and doing daily calisthenics.  Blood pressures at home running less than 125/80 mmHg on current medical therapy.  Tolerating current medical regimen very well.  Recently  "experienced a transient episode of expressive aphasia.  Evaluated for potential TIA at [facility].  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  He has completed a 3-week course of dual antiplatelet therapy.  Statin therapy was recommended, he has refused that therapy.  Would rather proceed with dietary modifications.    PHYSICAL EXAMINATION:  Vitals:    12/13/22 1427   BP: 142/90   BP Location: Left arm   Patient Position: Sitting   Cuff Size: Adult   Pulse: 56   SpO2: 96%   Weight: 64.2 kg (141 lb 8 oz)   Height: 172.7 cm (68\")     General Appearance:    Alert, cooperative, no distress, appears stated age   Head:    Normocephalic, without obvious abnormality, atraumatic   Eyes:    conjunctiva/corneas clear   Nose:   Nares normal, septum midline, mucosa normal, no drainage   Throat:   Lips, teeth and gums normal   Neck:   Supple, symmetrical, trachea midline, no carotid    bruit or JVD   Lungs:     Clear to auscultation bilaterally, respirations unlabored   Chest Wall:    No tenderness or deformity    Heart:   Irregularly irregular, S1 and S2 normal, III/VI hs murmur apex, rub   or gallop, normal carotid impulse bilaterally without bruit.   Abdomen:     Soft, non-tender   Extremities:   Extremities normal, atraumatic, no cyanosis or edema   Pulses:   2+ and symmetric all extremities   Skin:   Skin color, texture, turgor normal, no rashes or lesions       Diagnostic Data:  Procedures  Lab Results   Component Value Date    TRIG 101 04/28/2022    HDL 40 04/28/2022     Lab Results   Component Value Date    GLUCOSE 88 04/28/2022    BUN 18 04/28/2022    CREATININE 0.99 04/28/2022     04/28/2022    K 4.6 04/28/2022     04/28/2022    CO2 26.4 04/28/2022     No results found for: HGBA1C  Lab Results   Component Value Date    WBC 7.71 12/05/2022    HGB 17.0 12/05/2022    HCT 49.0 12/05/2022     12/05/2022       Allergies  No Known Allergies    Current Medications    Current " Outpatient Medications:   •  aspirin 81 MG EC tablet, Take 81 mg by mouth Daily., Disp: , Rfl:   •  carvedilol (COREG) 12.5 MG tablet, Pt takes 6.25 mg in am and 12.5 mg  in pm, Disp: 135 tablet, Rfl: 3  •  cloNIDine (CATAPRES) 0.1 MG tablet, TAKE ONE AND ONE-HALF TABLETS (0.15 MG) EVERY MORNING AND TAKE ONE TABLET AT BEDTIME, Disp: 225 tablet, Rfl: 3  •  clopidogrel (PLAVIX) 75 MG tablet, Take 75 mg by mouth Daily., Disp: , Rfl:   •  enalapril (VASOTEC) 5 MG tablet, Take 1 tablet by mouth Daily., Disp: 90 tablet, Rfl: 1          ROS  Review of Systems   Eyes: Negative for visual disturbance.   Cardiovascular: Positive for irregular heartbeat. Negative for chest pain, dyspnea on exertion and palpitations.   Respiratory: Negative for cough and shortness of breath.          SOCIAL HX  Social History     Socioeconomic History   • Marital status:    Tobacco Use   • Smoking status: Never   • Smokeless tobacco: Never   Vaping Use   • Vaping Use: Never used   Substance and Sexual Activity   • Alcohol use: Yes     Comment: Insignificant amounts   • Drug use: No   • Sexual activity: Not Currently     Partners: Female       FAMILY HX  Family History   Problem Relation Age of Onset   • Heart attack Father         46   • Transient ischemic attack Mother    • Cancer Mother              Howard Rivera III, MD, FACC

## 2022-12-30 ENCOUNTER — HOSPITAL ENCOUNTER (OUTPATIENT)
Dept: CARDIOLOGY | Facility: HOSPITAL | Age: 77
Discharge: HOME | End: 2022-12-30
Admitting: INTERNAL MEDICINE

## 2022-12-30 VITALS — WEIGHT: 141.54 LBS | BODY MASS INDEX: 21.45 KG/M2 | HEIGHT: 68 IN

## 2022-12-30 DIAGNOSIS — I34.0 MITRAL VALVE INSUFFICIENCY, UNSPECIFIED ETIOLOGY: ICD-10-CM

## 2022-12-30 LAB
ASCENDING AORTA: 3.6 CM
BH CV ECHO MEAS - AI P1/2T: 563.3 MSEC
BH CV ECHO MEAS - AO MAX PG: 4 MMHG
BH CV ECHO MEAS - AO MEAN PG: 2 MMHG
BH CV ECHO MEAS - AO ROOT AREA (BSA CORRECTED): 2.3 CM2
BH CV ECHO MEAS - AO ROOT DIAM: 4.1 CM
BH CV ECHO MEAS - AO V2 MAX: 100 CM/SEC
BH CV ECHO MEAS - AO V2 VTI: 19.8 CM
BH CV ECHO MEAS - AVA(I,D): 2.24 CM2
BH CV ECHO MEAS - EDV(CUBED): 96.1 ML
BH CV ECHO MEAS - EDV(MOD-SP2): 147 ML
BH CV ECHO MEAS - EDV(MOD-SP4): 148 ML
BH CV ECHO MEAS - EF(MOD-BP): 57 %
BH CV ECHO MEAS - EF(MOD-SP2): 58.5 %
BH CV ECHO MEAS - EF(MOD-SP4): 55.4 %
BH CV ECHO MEAS - ESV(CUBED): 39.7 ML
BH CV ECHO MEAS - ESV(MOD-SP2): 61 ML
BH CV ECHO MEAS - ESV(MOD-SP4): 66 ML
BH CV ECHO MEAS - FS: 25.5 %
BH CV ECHO MEAS - IVS/LVPW: 0.92 CM
BH CV ECHO MEAS - IVSD: 1.2 CM
BH CV ECHO MEAS - LA DIMENSION: 5.3 CM
BH CV ECHO MEAS - LAT PEAK E' VEL: 13.9 CM/SEC
BH CV ECHO MEAS - LV DIASTOLIC VOL/BSA (35-75): 84 CM2
BH CV ECHO MEAS - LV MASS(C)D: 215.9 GRAMS
BH CV ECHO MEAS - LV MAX PG: 1.77 MMHG
BH CV ECHO MEAS - LV MEAN PG: 1 MMHG
BH CV ECHO MEAS - LV SYSTOLIC VOL/BSA (12-30): 37.5 CM2
BH CV ECHO MEAS - LV V1 MAX: 66.6 CM/SEC
BH CV ECHO MEAS - LV V1 VTI: 12.8 CM
BH CV ECHO MEAS - LVIDD: 4.6 CM
BH CV ECHO MEAS - LVIDS: 3.4 CM
BH CV ECHO MEAS - LVOT AREA: 3.5 CM2
BH CV ECHO MEAS - LVOT DIAM: 2.1 CM
BH CV ECHO MEAS - LVPWD: 1.3 CM
BH CV ECHO MEAS - MED PEAK E' VEL: 8.77 CM/SEC
BH CV ECHO MEAS - MR MAX PG: 148.9 MMHG
BH CV ECHO MEAS - MR MAX VEL: 610 CM/SEC
BH CV ECHO MEAS - MR MEAN PG: 96.5 MMHG
BH CV ECHO MEAS - MR MEAN VEL: 468.5 CM/SEC
BH CV ECHO MEAS - MR VTI: 202 CM
BH CV ECHO MEAS - MV DEC SLOPE: 808 CM/SEC2
BH CV ECHO MEAS - MV DEC TIME: 0.13 MSEC
BH CV ECHO MEAS - MV E MAX VEL: 138 CM/SEC
BH CV ECHO MEAS - MV MAX PG: 8.6 MMHG
BH CV ECHO MEAS - MV MEAN PG: 2.7 MMHG
BH CV ECHO MEAS - MV P1/2T: 56.5 MSEC
BH CV ECHO MEAS - MV V2 VTI: 32.4 CM
BH CV ECHO MEAS - MVA(P1/2T): 3.9 CM2
BH CV ECHO MEAS - MVA(VTI): 1.37 CM2
BH CV ECHO MEAS - PA ACC SLOPE: 426 CM/SEC2
BH CV ECHO MEAS - PA ACC TIME: 0.13 SEC
BH CV ECHO MEAS - PA PR(ACCEL): 21.9 MMHG
BH CV ECHO MEAS - PA V2 MAX: 109 CM/SEC
BH CV ECHO MEAS - PI END-D VEL: 129 CM/SEC
BH CV ECHO MEAS - RAP SYSTOLE: 8 MMHG
BH CV ECHO MEAS - RVSP: 33 MMHG
BH CV ECHO MEAS - SI(MOD-SP2): 48.8 ML/M2
BH CV ECHO MEAS - SI(MOD-SP4): 46.5 ML/M2
BH CV ECHO MEAS - SV(LVOT): 44.3 ML
BH CV ECHO MEAS - SV(MOD-SP2): 86 ML
BH CV ECHO MEAS - SV(MOD-SP4): 82 ML
BH CV ECHO MEAS - TAPSE (>1.6): 1.6 CM
BH CV ECHO MEAS - TR MAX PG: 25 MMHG
BH CV ECHO MEAS - TR MAX VEL: 250 CM/SEC
BH CV ECHO MEASUREMENTS AVERAGE E/E' RATIO: 12.17
BH CV VAS BP LEFT ARM: NORMAL MMHG
BH CV XLRA - RV BASE: 4.2 CM
BH CV XLRA - RV LENGTH: 7.2 CM
BH CV XLRA - RV MID: 2.8 CM
BH CV XLRA - TDI S': 10.9 CM/SEC
LEFT ATRIUM VOLUME INDEX: 71 ML/M2
MAXIMAL PREDICTED HEART RATE: 143 BPM
STRESS TARGET HR: 122 BPM

## 2022-12-30 PROCEDURE — 93306 TTE W/DOPPLER COMPLETE: CPT | Performed by: INTERNAL MEDICINE

## 2022-12-30 PROCEDURE — 93306 TTE W/DOPPLER COMPLETE: CPT

## 2023-01-03 ENCOUNTER — TELEPHONE (OUTPATIENT)
Dept: CARDIOLOGY | Facility: CLINIC | Age: 78
End: 2023-01-03
Payer: MEDICARE

## 2023-01-03 NOTE — TELEPHONE ENCOUNTER
----- Message from Howard Rivera III, MD sent at 1/3/2023  1:28 PM EST -----  Echo revealed that everything was stable with regard to his heart and valve function.

## 2023-01-24 ENCOUNTER — OFFICE VISIT (OUTPATIENT)
Dept: FAMILY MEDICINE CLINIC | Facility: CLINIC | Age: 78
End: 2023-01-24
Payer: MEDICARE

## 2023-01-24 VITALS
DIASTOLIC BLOOD PRESSURE: 84 MMHG | OXYGEN SATURATION: 100 % | HEIGHT: 68 IN | SYSTOLIC BLOOD PRESSURE: 142 MMHG | WEIGHT: 143.4 LBS | BODY MASS INDEX: 21.73 KG/M2 | HEART RATE: 64 BPM | TEMPERATURE: 98.4 F

## 2023-01-24 DIAGNOSIS — I48.0 PAROXYSMAL ATRIAL FIBRILLATION: ICD-10-CM

## 2023-01-24 DIAGNOSIS — F41.9 ANXIETY: ICD-10-CM

## 2023-01-24 DIAGNOSIS — I49.3 PREMATURE VENTRICULAR CONTRACTIONS: ICD-10-CM

## 2023-01-24 DIAGNOSIS — I34.1 MITRAL VALVE PROLAPSE: ICD-10-CM

## 2023-01-24 DIAGNOSIS — Z00.00 MEDICARE ANNUAL WELLNESS VISIT, SUBSEQUENT: Primary | ICD-10-CM

## 2023-01-24 PROCEDURE — 1159F MED LIST DOCD IN RCRD: CPT | Performed by: FAMILY MEDICINE

## 2023-01-24 PROCEDURE — G0439 PPPS, SUBSEQ VISIT: HCPCS | Performed by: FAMILY MEDICINE

## 2023-01-24 PROCEDURE — 1170F FXNL STATUS ASSESSED: CPT | Performed by: FAMILY MEDICINE

## 2023-01-24 PROCEDURE — 99213 OFFICE O/P EST LOW 20 MIN: CPT | Performed by: FAMILY MEDICINE

## 2023-01-24 NOTE — PROGRESS NOTES
The ABCs of the Annual Wellness Visit  Subsequent Medicare Wellness Visit    Subjective    Michael Mallory is a 77 y.o. male who presents for a Subsequent Medicare Wellness Visit.    The following portions of the patient's history were reviewed and   updated as appropriate: allergies, current medications, past family history, past medical history, past social history, past surgical history and problem list.    Compared to one year ago, the patient feels his physical   health is the same.    Compared to one year ago, the patient feels his mental   health is worse.    Recent Hospitalizations:  He was not admitted to the hospital during the last year.       Current Medical Providers:  Patient Care Team:  Jordi Ruiz MD as PCP - General (Family Medicine)  Howard Rivera III, MD as Cardiologist (Cardiology)    Outpatient Medications Prior to Visit   Medication Sig Dispense Refill   • aspirin 81 MG EC tablet Take 81 mg by mouth Daily.     • carvedilol (COREG) 12.5 MG tablet Pt takes 6.25 mg in am and 12.5 mg  in pm 135 tablet 3   • cloNIDine (CATAPRES) 0.1 MG tablet TAKE ONE AND ONE-HALF TABLETS (0.15 MG) EVERY MORNING AND TAKE ONE TABLET AT BEDTIME 225 tablet 3   • enalapril (VASOTEC) 5 MG tablet Take 1 tablet by mouth Daily. 90 tablet 1   • clopidogrel (PLAVIX) 75 MG tablet Take 75 mg by mouth Daily.       No facility-administered medications prior to visit.       No opioid medication identified on active medication list. I have reviewed chart for other potential  high risk medication/s and harmful drug interactions in the elderly.          Aspirin is on active medication list. Aspirin use is indicated based on review of current medical condition/s. Pros and cons of this therapy have been discussed today. Benefits of this medication outweigh potential harm.  Patient has been encouraged to continue taking this medication.  .      Patient Active Problem List   Diagnosis   • Atrial fibrillation (HCC)   • Hypertension  "  • Mitral valve disorder   • Palpitations   • Heart murmur   • Shortness of breath   • Mitral valve insufficiency   • Pulmonary hypertension (HCC)   • H/O mitral valve repair   • Cardiomyopathy (HCC)   • Premature ventricular contractions   • Valvular heart disease   • Anxiety   • Mitral valve prolapse     Advance Care Planning  Advance Directive is not on file.  ACP discussion was held with the patient during this visit. Patient does not have an advance directive, information provided.     Objective    Vitals:    01/24/23 0909   BP: 142/84   BP Location: Left arm   Patient Position: Sitting   Cuff Size: Adult   Pulse: 64   Temp: 98.4 °F (36.9 °C)   TempSrc: Temporal   SpO2: 100%   Weight: 65 kg (143 lb 6.4 oz)   Height: 172.7 cm (67.99\")     Estimated body mass index is 21.81 kg/m² as calculated from the following:    Height as of this encounter: 172.7 cm (67.99\").    Weight as of this encounter: 65 kg (143 lb 6.4 oz).    BMI is within normal parameters. No other follow-up for BMI required.      Does the patient have evidence of cognitive impairment? No    Lab Results   Component Value Date    HGBA1C 4.9 01/10/2023        HEALTH RISK ASSESSMENT    Smoking Status:  Social History     Tobacco Use   Smoking Status Never   Smokeless Tobacco Never     Alcohol Consumption:  Social History     Substance and Sexual Activity   Alcohol Use Not Currently    Comment: Insignificant amounts     Fall Risk Screen:    STEADI Fall Risk Assessment was completed, and patient is at LOW risk for falls.Assessment completed on:1/24/2023    Depression Screening:  PHQ-2/PHQ-9 Depression Screening 1/24/2023   Little Interest or Pleasure in Doing Things 0-->not at all   Feeling Down, Depressed or Hopeless 0-->not at all   Trouble Falling or Staying Asleep, or Sleeping Too Much -   Feeling Tired or Having Little Energy -   Poor Appetite or Overeating -   Feeling Bad about Yourself - or that You are a Failure or Have Let Yourself or Your " Family Down -   Trouble Concentrating on Things, Such as Reading the Newspaper or Watching Television -   Moving or Speaking So Slowly that Other People Could Have Noticed? Or the Opposite - Being So Fidgety -   Thoughts that You Would be Better Off Dead or of Hurting Yourself in Some Way -   PHQ-9: Brief Depression Severity Measure Score 0   If You Checked Off Any Problems, How Difficult Have These Problems Made It For You to Do Your Work, Take Care of Things at Home, or Get Along with Other People? -       Health Habits and Functional and Cognitive Screening:  Functional & Cognitive Status 1/24/2023   Do you have difficulty preparing food and eating? No   Do you have difficulty bathing yourself, getting dressed or grooming yourself? No   Do you have difficulty using the toilet? No   Do you have difficulty moving around from place to place? No   Do you have trouble with steps or getting out of a bed or a chair? No   Current Diet Well Balanced Diet   Dental Exam Up to date   Eye Exam Not up to date   Exercise (times per week) 7 times per week   Current Exercises Include Home Exercise Program (TV, Computer, Etc.);Walking   Do you need help using the phone?  No   Are you deaf or do you have serious difficulty hearing?  No   Do you need help with transportation? No   Do you need help shopping? No   Do you need help preparing meals?  No   Do you need help with housework?  No   Do you need help with laundry? No   Do you need help taking your medications? No   Do you need help managing money? No   Do you ever drive or ride in a car without wearing a seat belt? No   Have you felt unusual stress, anger or loneliness in the last month? No   Who do you live with? Spouse   If you need help, do you have trouble finding someone available to you? No   Have you been bothered in the last four weeks by sexual problems? No   Do you have difficulty concentrating, remembering or making decisions? No       Age-appropriate Screening  "Schedule:  Refer to the list below for future screening recommendations based on patient's age, sex and/or medical conditions. Orders for these recommended tests are listed in the plan section. The patient has been provided with a written plan.    Health Maintenance   Topic Date Due   • TDAP/TD VACCINES (1 - Tdap) 04/03/2023 (Originally 9/20/1964)   • ZOSTER VACCINE (1 of 2) 04/03/2023 (Originally 9/20/1995)   • INFLUENZA VACCINE  Discontinued                CMS Preventative Services Quick Reference  Risk Factors Identified During Encounter  None Identified  Atrial fibrillation, PVCs  The above risks/problems have been discussed with the patient.  Pertinent information has been shared with the patient in the After Visit Summary.  An After Visit Summary and PPPS were made available to the patient.    Follow Up:   Next Medicare Wellness visit to be scheduled in 1 year.       Additional E&M Note during same encounter follows:  Patient has multiple medical problems which are significant and separately identifiable that require additional work above and beyond the Medicare Wellness Visit.      Chief Complaint  Medicare Wellness-subsequent    Subjective        HPI  Michael Mallory is also being seen today for His hypertension, PVC, atrial fibrillation and anxiety.         Objective   Vital Signs:  /84 (BP Location: Left arm, Patient Position: Sitting, Cuff Size: Adult)   Pulse 64   Temp 98.4 °F (36.9 °C) (Temporal)   Ht 172.7 cm (67.99\")   Wt 65 kg (143 lb 6.4 oz)   SpO2 100%   BMI 21.81 kg/m²     Physical Exam                    Assessment and Plan   Diagnoses and all orders for this visit:    1. Medicare annual wellness visit, subsequent (Primary)    2. Paroxysmal atrial fibrillation (HCC)  Assessment & Plan:  Patient currently asymptomatic.  Only taking aspirin 81 mg p.o. daily.      3. Premature ventricular contractions  Assessment & Plan:  Will continue current medications        4. Mitral valve " prolapse  Assessment & Plan:  Patient asymptomatic      5. Anxiety  Assessment & Plan:  Patient doing better, he used to take BuSpar and Valium as needed.           I spent 30 minutes caring for Michael on this date of service. This time includes time spent by me in the following activities:preparing for the visit, performing a medically appropriate examination and/or evaluation , counseling and educating the patient/family/caregiver, ordering medications, tests, or procedures and documenting information in the medical record       Follow Up   Return in about 3 months (around 4/24/2023) for Annual physical, Blood work.  Patient was given instructions and counseling regarding his condition or for health maintenance advice. Please see specific information pulled into the AVS if appropriate.       The patient is advised to continue all of his regular medications as prescribed. He was counseled regarding the importance of diet, exercise and medication compliance.

## 2023-01-24 NOTE — PATIENT INSTRUCTIONS
"Hypertension, Adult  High blood pressure (hypertension) is when the force of blood pumping through the arteries is too strong. The arteries are the blood vessels that carry blood from the heart throughout the body. Hypertension forces the heart to work harder to pump blood and may cause arteries to become narrow or stiff. Untreated or uncontrolled hypertension can cause a heart attack, heart failure, a stroke, kidney disease, and other problems.  A blood pressure reading consists of a higher number over a lower number. Ideally, your blood pressure should be below 120/80. The first (\"top\") number is called the systolic pressure. It is a measure of the pressure in your arteries as your heart beats. The second (\"bottom\") number is called the diastolic pressure. It is a measure of the pressure in your arteries as the heart relaxes.  What are the causes?  The exact cause of this condition is not known. There are some conditions that result in or are related to high blood pressure.  What increases the risk?  Some risk factors for high blood pressure are under your control. The following factors may make you more likely to develop this condition:  Smoking.  Having type 2 diabetes mellitus, high cholesterol, or both.  Not getting enough exercise or physical activity.  Being overweight.  Having too much fat, sugar, calories, or salt (sodium) in your diet.  Drinking too much alcohol.  Some risk factors for high blood pressure may be difficult or impossible to change. Some of these factors include:  Having chronic kidney disease.  Having a family history of high blood pressure.  Age. Risk increases with age.  Race. You may be at higher risk if you are .  Gender. Men are at higher risk than women before age 45. After age 65, women are at higher risk than men.  Having obstructive sleep apnea.  Stress.  What are the signs or symptoms?  High blood pressure may not cause symptoms. Very high blood pressure " (hypertensive crisis) may cause:  Headache.  Anxiety.  Shortness of breath.  Nosebleed.  Nausea and vomiting.  Vision changes.  Severe chest pain.  Seizures.  How is this diagnosed?  This condition is diagnosed by measuring your blood pressure while you are seated, with your arm resting on a flat surface, your legs uncrossed, and your feet flat on the floor. The cuff of the blood pressure monitor will be placed directly against the skin of your upper arm at the level of your heart. It should be measured at least twice using the same arm. Certain conditions can cause a difference in blood pressure between your right and left arms.  Certain factors can cause blood pressure readings to be lower or higher than normal for a short period of time:  When your blood pressure is higher when you are in a health care provider's office than when you are at home, this is called white coat hypertension. Most people with this condition do not need medicines.  When your blood pressure is higher at home than when you are in a health care provider's office, this is called masked hypertension. Most people with this condition may need medicines to control blood pressure.  If you have a high blood pressure reading during one visit or you have normal blood pressure with other risk factors, you may be asked to:  Return on a different day to have your blood pressure checked again.  Monitor your blood pressure at home for 1 week or longer.  If you are diagnosed with hypertension, you may have other blood or imaging tests to help your health care provider understand your overall risk for other conditions.  How is this treated?  This condition is treated by making healthy lifestyle changes, such as eating healthy foods, exercising more, and reducing your alcohol intake. Your health care provider may prescribe medicine if lifestyle changes are not enough to get your blood pressure under control, and if:  Your systolic blood pressure is above  130.  Your diastolic blood pressure is above 80.  Your personal target blood pressure may vary depending on your medical conditions, your age, and other factors.  Follow these instructions at home:  Eating and drinking    Eat a diet that is high in fiber and potassium, and low in sodium, added sugar, and fat. An example eating plan is called the DASH (Dietary Approaches to Stop Hypertension) diet. To eat this way:  Eat plenty of fresh fruits and vegetables. Try to fill one half of your plate at each meal with fruits and vegetables.  Eat whole grains, such as whole-wheat pasta, brown rice, or whole-grain bread. Fill about one fourth of your plate with whole grains.  Eat or drink low-fat dairy products, such as skim milk or low-fat yogurt.  Avoid fatty cuts of meat, processed or cured meats, and poultry with skin. Fill about one fourth of your plate with lean proteins, such as fish, chicken without skin, beans, eggs, or tofu.  Avoid pre-made and processed foods. These tend to be higher in sodium, added sugar, and fat.  Reduce your daily sodium intake. Most people with hypertension should eat less than 1,500 mg of sodium a day.  Do not drink alcohol if:  Your health care provider tells you not to drink.  You are pregnant, may be pregnant, or are planning to become pregnant.  If you drink alcohol:  Limit how much you use to:  0-1 drink a day for women.  0-2 drinks a day for men.  Be aware of how much alcohol is in your drink. In the [place] one drink equals one 12 oz bottle of beer (355 mL), one 5 oz glass of wine (148 mL), or one 1½ oz glass of hard liquor (44 mL).  Lifestyle    Work with your health care provider to maintain a healthy body weight or to lose weight. Ask what an ideal weight is for you.  Get at least 30 minutes of exercise most days of the week. Activities may include walking, swimming, or biking.  Include exercise to strengthen your muscles (resistance exercise), such as Pilates or lifting weights, as  part of your weekly exercise routine. Try to do these types of exercises for 30 minutes at least 3 days a week.  Do not use any products that contain nicotine or tobacco, such as cigarettes, e-cigarettes, and chewing tobacco. If you need help quitting, ask your health care provider.  Monitor your blood pressure at home as told by your health care provider.  Keep all follow-up visits as told by your health care provider. This is important.  Medicines  Take over-the-counter and prescription medicines only as told by your health care provider. Follow directions carefully. Blood pressure medicines must be taken as prescribed.  Do not skip doses of blood pressure medicine. Doing this puts you at risk for problems and can make the medicine less effective.  Ask your health care provider about side effects or reactions to medicines that you should watch for.  Contact a health care provider if you:  Think you are having a reaction to a medicine you are taking.  Have headaches that keep coming back (recurring).  Feel dizzy.  Have swelling in your ankles.  Have trouble with your vision.  Get help right away if you:  Develop a severe headache or confusion.  Have unusual weakness or numbness.  Feel faint.  Have severe pain in your chest or abdomen.  Vomit repeatedly.  Have trouble breathing.  Summary  Hypertension is when the force of blood pumping through your arteries is too strong. If this condition is not controlled, it may put you at risk for serious complications.  Your personal target blood pressure may vary depending on your medical conditions, your age, and other factors. For most people, a normal blood pressure is less than 120/80.  Hypertension is treated with lifestyle changes, medicines, or a combination of both. Lifestyle changes include losing weight, eating a healthy, low-sodium diet, exercising more, and limiting alcohol.  This information is not intended to replace advice given to you by your health care  provider. Make sure you discuss any questions you have with your health care provider.  Document Revised: 08/28/2019 Document Reviewed: 08/28/2019  [employer] Patient Education © 2022 [employer]  Health Maintenance, Male  Adopting a healthy lifestyle and getting preventive care are important in promoting health and wellness. Ask your health care provider about:  The right schedule for you to have regular tests and exams.  Things you can do on your own to prevent diseases and keep yourself healthy.  What should I know about diet, weight, and exercise?  Eat a healthy diet    Eat a diet that includes plenty of vegetables, fruits, low-fat dairy products, and lean protein.  Do not eat a lot of foods that are high in solid fats, added sugars, or sodium.  Maintain a healthy weight  Body mass index (BMI) is a measurement that can be used to identify possible weight problems. It estimates body fat based on height and weight. Your health care provider can help determine your BMI and help you achieve or maintain a healthy weight.  Get regular exercise  Get regular exercise. This is one of the most important things you can do for your health. Most adults should:  Exercise for at least 150 minutes each week. The exercise should increase your heart rate and make you sweat (moderate-intensity exercise).  Do strengthening exercises at least twice a week. This is in addition to the moderate-intensity exercise.  Spend less time sitting. Even light physical activity can be beneficial.  Watch cholesterol and blood lipids  Have your blood tested for lipids and cholesterol at 20 years of age, then have this test every 5 years.  You may need to have your cholesterol levels checked more often if:  Your lipid or cholesterol levels are high.  You are older than 40 years of age.  You are at high risk for heart disease.  What should I know about cancer screening?  Many types of cancers can be detected early and may often be prevented.  Depending on your health history and family history, you may need to have cancer screening at various ages. This may include screening for:  Colorectal cancer.  Prostate cancer.  Skin cancer.  Lung cancer.  What should I know about heart disease, diabetes, and high blood pressure?  Blood pressure and heart disease  High blood pressure causes heart disease and increases the risk of stroke. This is more likely to develop in people who have high blood pressure readings or are overweight.  Talk with your health care provider about your target blood pressure readings.  Have your blood pressure checked:  Every 3-5 years if you are 18-39 years of age.  Every year if you are 40 years old or older.  If you are between the ages of 65 and 75 and are a current or former smoker, ask your health care provider if you should have a one-time screening for abdominal aortic aneurysm (AAA).  Diabetes  Have regular diabetes screenings. This checks your fasting blood sugar level. Have the screening done:  Once every three years after age 45 if you are at a normal weight and have a low risk for diabetes.  More often and at a younger age if you are overweight or have a high risk for diabetes.  What should I know about preventing infection?  Hepatitis B  If you have a higher risk for hepatitis B, you should be screened for this virus. Talk with your health care provider to find out if you are at risk for hepatitis B infection.  Hepatitis C  Blood testing is recommended for:  Everyone born from 1945 through 1965.  Anyone with known risk factors for hepatitis C.  Sexually transmitted infections (STIs)  You should be screened each year for STIs, including gonorrhea and chlamydia, if:  You are sexually active and are younger than 24 years of age.  You are older than 24 years of age and your health care provider tells you that you are at risk for this type of infection.  Your sexual activity has changed since you were last screened, and you are  at increased risk for chlamydia or gonorrhea. Ask your health care provider if you are at risk.  Ask your health care provider about whether you are at high risk for HIV. Your health care provider may recommend a prescription medicine to help prevent HIV infection. If you choose to take medicine to prevent HIV, you should first get tested for HIV. You should then be tested every 3 months for as long as you are taking the medicine.  Follow these instructions at home:  Alcohol use  Do not drink alcohol if your health care provider tells you not to drink.  If you drink alcohol:  Limit how much you have to 0-2 drinks a day.  Know how much alcohol is in your drink. In the [place] one drink equals one 12 oz bottle of beer (355 mL), one 5 oz glass of wine (148 mL), or one 1½ oz glass of hard liquor (44 mL).  Lifestyle  Do not use any products that contain nicotine or tobacco. These products include cigarettes, chewing tobacco, and vaping devices, such as e-cigarettes. If you need help quitting, ask your health care provider.  Do not use street drugs.  Do not share needles.  Ask your health care provider for help if you need support or information about quitting drugs.  General instructions  Schedule regular health, dental, and eye exams.  Stay current with your vaccines.  Tell your health care provider if:  You often feel depressed.  You have ever been abused or do not feel safe at home.  Summary  Adopting a healthy lifestyle and getting preventive care are important in promoting health and wellness.  Follow your health care provider's instructions about healthy diet, exercising, and getting tested or screened for diseases.  Follow your health care provider's instructions on monitoring your cholesterol and blood pressure.  This information is not intended to replace advice given to you by your health care provider. Make sure you discuss any questions you have with your health care provider.  Document Revised: 05/09/2022  Document Reviewed: 05/09/2022  [employer]vier Patient Education © 2022 [employer]

## 2023-03-27 RX ORDER — ENALAPRIL MALEATE 5 MG/1
TABLET ORAL
Qty: 90 TABLET | Refills: 1 | Status: SHIPPED | OUTPATIENT
Start: 2023-03-27

## 2023-04-12 ENCOUNTER — TELEPHONE (OUTPATIENT)
Dept: CARDIOLOGY | Facility: CLINIC | Age: 78
End: 2023-04-12
Payer: MEDICARE

## 2023-04-12 NOTE — TELEPHONE ENCOUNTER
LOUIE  Has a CT scan of his head and EKG during an ED visit on 4/7/23 at [facility]. He wants you to review them. Requested records.

## 2023-04-21 ENCOUNTER — TELEPHONE (OUTPATIENT)
Dept: FAMILY MEDICINE CLINIC | Facility: CLINIC | Age: 78
End: 2023-04-21
Payer: MEDICARE

## 2023-04-21 NOTE — TELEPHONE ENCOUNTER
Attempted to reach patient to confirm next [facility], patient did not answer. HUB okay to Confirm.

## 2023-04-24 ENCOUNTER — OFFICE VISIT (OUTPATIENT)
Dept: FAMILY MEDICINE CLINIC | Facility: CLINIC | Age: 78
End: 2023-04-24
Payer: MEDICARE

## 2023-04-24 VITALS
WEIGHT: 142.6 LBS | HEART RATE: 55 BPM | DIASTOLIC BLOOD PRESSURE: 96 MMHG | SYSTOLIC BLOOD PRESSURE: 142 MMHG | HEIGHT: 68 IN | RESPIRATION RATE: 18 BRPM | TEMPERATURE: 96.8 F | OXYGEN SATURATION: 99 % | BODY MASS INDEX: 21.61 KG/M2

## 2023-04-24 DIAGNOSIS — Z23 ENCOUNTER FOR IMMUNIZATION: ICD-10-CM

## 2023-04-24 DIAGNOSIS — I10 PRIMARY HYPERTENSION: ICD-10-CM

## 2023-04-24 DIAGNOSIS — I48.0 PAROXYSMAL ATRIAL FIBRILLATION: ICD-10-CM

## 2023-04-24 DIAGNOSIS — Z00.00 ROUTINE GENERAL MEDICAL EXAMINATION AT A HEALTH CARE FACILITY: Primary | ICD-10-CM

## 2023-04-24 NOTE — PROGRESS NOTES
"Chief Complaint  No chief complaint on file.    Subjective    {Problem List  Visit Diagnosis   Encounters  Notes  Medications  Labs  Result Review Imaging  Media :23}    Michael Mallory presents to [facility]  History of Present Illness    Objective   Vital Signs:  There were no vitals taken for this visit.  Estimated body mass index is 21.81 kg/m² as calculated from the following:    Height as of 1/24/23: 172.7 cm (67.99\").    Weight as of 1/24/23: 65 kg (143 lb 6.4 oz).       BMI is within normal parameters. No other follow-up for BMI required.      Physical Exam   Result Review :{Labs  Result Review  Imaging  Med Tab  Media  Procedures  :23}  {The following data was reviewed by (Optional):34639}  {Ambulatory Labs (Optional):05877}  {Data reviewed (Optional):34823:::1}             Assessment and Plan {CC Problem List  Visit Diagnosis   ROS  Review (Popup)  Health Maintenance  Quality  BestPractice  Medications  SmartSets  SnapShot Encounters  Media :23}  Diagnoses and all orders for this visit:    1. Routine general medical examination at a health care facility (Primary)    2. Primary hypertension    3. Paroxysmal atrial fibrillation           {Time Spent (Optional):81007}  Follow Up {Instructions Charge Capture  Follow-up Communications :23}  No follow-ups on file.  Patient was given instructions and counseling regarding his condition or for health maintenance advice. Please see specific information pulled into the AVS if appropriate.       "

## 2023-05-10 ENCOUNTER — OFFICE VISIT (OUTPATIENT)
Dept: FAMILY MEDICINE CLINIC | Facility: CLINIC | Age: 78
End: 2023-05-10
Payer: MEDICARE

## 2023-05-10 VITALS
RESPIRATION RATE: 18 BRPM | HEIGHT: 68 IN | WEIGHT: 143 LBS | SYSTOLIC BLOOD PRESSURE: 140 MMHG | BODY MASS INDEX: 21.67 KG/M2 | TEMPERATURE: 97.1 F | OXYGEN SATURATION: 98 % | HEART RATE: 67 BPM | DIASTOLIC BLOOD PRESSURE: 88 MMHG

## 2023-05-10 DIAGNOSIS — Z12.5 SCREENING FOR PROSTATE CANCER: ICD-10-CM

## 2023-05-10 DIAGNOSIS — I10 PRIMARY HYPERTENSION: Chronic | ICD-10-CM

## 2023-05-10 DIAGNOSIS — Z00.00 ROUTINE GENERAL MEDICAL EXAMINATION AT A HEALTH CARE FACILITY: Primary | ICD-10-CM

## 2023-05-10 DIAGNOSIS — I42.0 DILATED CARDIOMYOPATHY: ICD-10-CM

## 2023-05-10 DIAGNOSIS — I48.0 PAROXYSMAL ATRIAL FIBRILLATION: ICD-10-CM

## 2023-05-10 PROBLEM — R06.02 SHORTNESS OF BREATH: Status: RESOLVED | Noted: 2017-10-31 | Resolved: 2023-05-10

## 2023-05-10 LAB
ALBUMIN SERPL-MCNC: 4.4 G/DL (ref 3.5–5.2)
ALBUMIN/GLOB SERPL: 1.8 G/DL
ALP SERPL-CCNC: 87 U/L (ref 39–117)
ALT SERPL W P-5'-P-CCNC: 18 U/L (ref 1–41)
ANION GAP SERPL CALCULATED.3IONS-SCNC: 7 MMOL/L (ref 5–15)
AST SERPL-CCNC: 17 U/L (ref 1–40)
BASOPHILS # BLD AUTO: 0.05 10*3/MM3 (ref 0–0.2)
BASOPHILS NFR BLD AUTO: 0.8 % (ref 0–1.5)
BILIRUB BLD-MCNC: NEGATIVE MG/DL
BILIRUB SERPL-MCNC: 4.5 MG/DL (ref 0–1.2)
BUN SERPL-MCNC: 12 MG/DL (ref 8–23)
BUN/CREAT SERPL: 13.8 (ref 7–25)
CALCIUM SPEC-SCNC: 9.9 MG/DL (ref 8.6–10.5)
CHLORIDE SERPL-SCNC: 104 MMOL/L (ref 98–107)
CHOLEST SERPL-MCNC: 167 MG/DL (ref 0–200)
CLARITY, POC: CLEAR
CO2 SERPL-SCNC: 29 MMOL/L (ref 22–29)
COLOR UR: YELLOW
CREAT SERPL-MCNC: 0.87 MG/DL (ref 0.76–1.27)
DEPRECATED RDW RBC AUTO: 43.2 FL (ref 37–54)
EGFRCR SERPLBLD CKD-EPI 2021: 88.9 ML/MIN/1.73
EOSINOPHIL # BLD AUTO: 0.12 10*3/MM3 (ref 0–0.4)
EOSINOPHIL NFR BLD AUTO: 1.9 % (ref 0.3–6.2)
ERYTHROCYTE [DISTWIDTH] IN BLOOD BY AUTOMATED COUNT: 13 % (ref 12.3–15.4)
GLOBULIN UR ELPH-MCNC: 2.4 GM/DL
GLUCOSE SERPL-MCNC: 95 MG/DL (ref 65–99)
GLUCOSE UR STRIP-MCNC: NEGATIVE MG/DL
HCT VFR BLD AUTO: 47.9 % (ref 37.5–51)
HCV AB SER DONR QL: NORMAL
HDLC SERPL-MCNC: 46 MG/DL (ref 40–60)
HGB BLD-MCNC: 16.1 G/DL (ref 13–17.7)
IMM GRANULOCYTES # BLD AUTO: 0.02 10*3/MM3 (ref 0–0.05)
IMM GRANULOCYTES NFR BLD AUTO: 0.3 % (ref 0–0.5)
KETONES UR QL: NEGATIVE
LDLC SERPL CALC-MCNC: 103 MG/DL (ref 0–100)
LDLC/HDLC SERPL: 2.21 {RATIO}
LEUKOCYTE EST, POC: NEGATIVE
LYMPHOCYTES # BLD AUTO: 1.09 10*3/MM3 (ref 0.7–3.1)
LYMPHOCYTES NFR BLD AUTO: 17.7 % (ref 19.6–45.3)
MCH RBC QN AUTO: 30.4 PG (ref 26.6–33)
MCHC RBC AUTO-ENTMCNC: 33.6 G/DL (ref 31.5–35.7)
MCV RBC AUTO: 90.5 FL (ref 79–97)
MONOCYTES # BLD AUTO: 0.44 10*3/MM3 (ref 0.1–0.9)
MONOCYTES NFR BLD AUTO: 7.1 % (ref 5–12)
NEUTROPHILS NFR BLD AUTO: 4.45 10*3/MM3 (ref 1.7–7)
NEUTROPHILS NFR BLD AUTO: 72.2 % (ref 42.7–76)
NITRITE UR-MCNC: NEGATIVE MG/ML
NRBC BLD AUTO-RTO: 0 /100 WBC (ref 0–0.2)
PH UR: 6 [PH] (ref 5–8)
PLATELET # BLD AUTO: 152 10*3/MM3 (ref 140–450)
PMV BLD AUTO: 10.8 FL (ref 6–12)
POTASSIUM SERPL-SCNC: 5.5 MMOL/L (ref 3.5–5.2)
PROT SERPL-MCNC: 6.8 G/DL (ref 6–8.5)
PROT UR STRIP-MCNC: NEGATIVE MG/DL
RBC # BLD AUTO: 5.29 10*6/MM3 (ref 4.14–5.8)
RBC # UR STRIP: NEGATIVE /UL
SODIUM SERPL-SCNC: 140 MMOL/L (ref 136–145)
SP GR UR: 1.02 (ref 1–1.03)
TRIGL SERPL-MCNC: 96 MG/DL (ref 0–150)
TSH SERPL DL<=0.05 MIU/L-ACNC: 2.95 UIU/ML (ref 0.27–4.2)
UROBILINOGEN UR QL: NORMAL
VLDLC SERPL-MCNC: 18 MG/DL (ref 5–40)
WBC NRBC COR # BLD: 6.17 10*3/MM3 (ref 3.4–10.8)

## 2023-05-10 PROCEDURE — 86803 HEPATITIS C AB TEST: CPT | Performed by: FAMILY MEDICINE

## 2023-05-10 PROCEDURE — G0103 PSA SCREENING: HCPCS | Performed by: FAMILY MEDICINE

## 2023-05-10 PROCEDURE — 84443 ASSAY THYROID STIM HORMONE: CPT | Performed by: FAMILY MEDICINE

## 2023-05-10 PROCEDURE — 80061 LIPID PANEL: CPT | Performed by: FAMILY MEDICINE

## 2023-05-10 PROCEDURE — 85025 COMPLETE CBC W/AUTO DIFF WBC: CPT | Performed by: FAMILY MEDICINE

## 2023-05-10 PROCEDURE — 80053 COMPREHEN METABOLIC PANEL: CPT | Performed by: FAMILY MEDICINE

## 2023-05-10 NOTE — PROGRESS NOTES
"Chief Complaint  Annual Exam (Cont. Care of HTN, Anxiety, SOB. Pt states he is fasting for labs.)    Subjective        Michael Mallory presents to [facility]  History of Present Illness  Patient is a 77 y.o. male here today for annual physical, preventive labs and also to discuss chronic medical problems including hypertension and atrial fibrillation. Patient denies adverse effects of medications, headache, dizziness, chest pain, palpitations, shortness of breath, cough, nausea, vomiting, abdominal pain, muscle aches, joint pain and fatigue. Patient complains of no significant issue. Patient is here for monitoring of chronic issues due to need for monitoring of adverse effects and side effects.      Objective   Vital Signs:  /88 (BP Location: Right arm, Patient Position: Sitting, Cuff Size: Adult)   Pulse 67   Temp 97.1 °F (36.2 °C) (Temporal)   Resp 18   Ht 172.7 cm (68\")   Wt 64.9 kg (143 lb)   SpO2 98%   BMI 21.74 kg/m²   Estimated body mass index is 21.74 kg/m² as calculated from the following:    Height as of this encounter: 172.7 cm (68\").    Weight as of this encounter: 64.9 kg (143 lb).       BMI is within normal parameters. No other follow-up for BMI required.      Physical Exam  Constitutional:       Appearance: Normal appearance.   HENT:      Head: Normocephalic and atraumatic.      Right Ear: Tympanic membrane and ear canal normal.      Left Ear: Tympanic membrane and ear canal normal.      Nose: Nose normal.      Mouth/Throat:      Mouth: Mucous membranes are moist.      Pharynx: Oropharynx is clear.   Eyes:      Extraocular Movements: Extraocular movements intact.      Conjunctiva/sclera: Conjunctivae normal.      Pupils: Pupils are equal, round, and reactive to light.   Cardiovascular:      Rate and Rhythm: Normal rate and regular rhythm.      Pulses: Normal pulses.      Heart sounds: Murmur heard.    Systolic murmur is present with a grade of " 3/6.  Pulmonary:      Effort: Pulmonary effort is normal.      Breath sounds: Normal breath sounds. No decreased breath sounds, wheezing, rhonchi or rales.   Abdominal:      General: Bowel sounds are normal.      Tenderness: There is no abdominal tenderness. There is no right CVA tenderness, left CVA tenderness, guarding or rebound.      Hernia: No hernia is present.   Musculoskeletal:      Cervical back: Normal range of motion and neck supple.      Right lower leg: No edema.      Left lower leg: No edema.   Feet:      Right foot:      Skin integrity: Skin integrity normal.      Toenail Condition: Right toenails are normal.      Left foot:      Toenail Condition: Left toenails are normal.   Lymphadenopathy:      Head:      Right side of head: No submental, submandibular, preauricular, posterior auricular or occipital adenopathy.      Left side of head: No submental, submandibular, preauricular, posterior auricular or occipital adenopathy.      Cervical: No cervical adenopathy.      Upper Body:      Right upper body: No supraclavicular or axillary adenopathy.      Left upper body: No supraclavicular or axillary adenopathy.   Skin:     General: Skin is warm.      Capillary Refill: Capillary refill takes less than 2 seconds.      Findings: No rash.      Nails: There is no clubbing.   Neurological:      Mental Status: He is alert and oriented to person, place, and time.      Sensory: Sensation is intact.      Motor: Motor function is intact.      Coordination: Coordination is intact.      Gait: Gait is intact.      Deep Tendon Reflexes: Reflexes are normal and symmetric.   Psychiatric:         Attention and Perception: Attention and perception normal.         Mood and Affect: Mood and affect normal.         Speech: Speech normal.         Behavior: Behavior normal. Behavior is cooperative.         Thought Content: Thought content normal.         Cognition and Memory: Cognition normal.         Judgment: Judgment normal.         Result Review :                   Assessment and Plan   Diagnoses and all orders for this visit:    1. Routine general medical examination at a health care facility (Primary)  -     CBC & Differential  -     Comprehensive Metabolic Panel  -     Lipid Panel  -     TSH  -     POC Urinalysis Dipstick  -     Hepatitis C Antibody    2. Primary hypertension  Assessment & Plan:  Hypertension is stable.  Continue current treatment regimen.  Dietary sodium restriction.  Continue current medications.  Blood pressure will be reassessed at the next regular appointment.      3. Paroxysmal atrial fibrillation  Assessment & Plan:  Stable. Following up with Cardiology regularly.      4. Dilated cardiomyopathy  Assessment & Plan:  Congestive heart failure due to heart valve disease.  Heart failure is improving with treatment.  NYHA Class I.  Continue current treatment regimen.  Continue current medications.  Heart failure will be reassessed in 1 year.      5. Screening for prostate cancer  -     PSA Screen         I spent 30 minutes caring for Michael on this date of service. This time includes time spent by me in the following activities:preparing for the visit, performing a medically appropriate examination and/or evaluation , counseling and educating the patient/family/caregiver and documenting information in the medical record       Follow Up   Return in about 1 year (around 5/10/2024) for Annual physical, Blood work.  Patient was given instructions and counseling regarding his condition or for health maintenance advice. Please see specific information pulled into the AVS if appropriate.     The patient is advised to continue all of his regular medications as prescribed. He was counseled regarding the importance of diet, exercise and medication compliance.    The preventive exam has been reviewed in detail.  The patient has been fully counseled on preventative guidelines for vaccines, cancer screenings, and other health  maintenance needs.   The patient has been counseled on guidelines for maintaining a lifestyle to promote good health and to minimize chronic diseases.  The patient has been assisted with scheduling these healthcare procedures for the coming year and given a written document of health maintenance and anticipatory guidance for age with the AVS.      This document has been electronically signed by Jordi Ruiz MD  May 10, 2023 13:17 EDT

## 2023-05-10 NOTE — ASSESSMENT & PLAN NOTE
Congestive heart failure due to heart valve disease.  Heart failure is improving with treatment.  NYHA Class I.  Continue current treatment regimen.  Continue current medications.  Heart failure will be reassessed in 1 year.

## 2023-05-10 NOTE — PATIENT INSTRUCTIONS
Health Maintenance, Male  Adopting a healthy lifestyle and getting preventive care are important in promoting health and wellness. Ask your health care provider about:  The right schedule for you to have regular tests and exams.  Things you can do on your own to prevent diseases and keep yourself healthy.  What should I know about diet, weight, and exercise?  Eat a healthy diet    Eat a diet that includes plenty of vegetables, fruits, low-fat dairy products, and lean protein.  Do not eat a lot of foods that are high in solid fats, added sugars, or sodium.  Maintain a healthy weight  Body mass index (BMI) is a measurement that can be used to identify possible weight problems. It estimates body fat based on height and weight. Your health care provider can help determine your BMI and help you achieve or maintain a healthy weight.  Get regular exercise  Get regular exercise. This is one of the most important things you can do for your health. Most adults should:  Exercise for at least 150 minutes each week. The exercise should increase your heart rate and make you sweat (moderate-intensity exercise).  Do strengthening exercises at least twice a week. This is in addition to the moderate-intensity exercise.  Spend less time sitting. Even light physical activity can be beneficial.  Watch cholesterol and blood lipids  Have your blood tested for lipids and cholesterol at 20 years of age, then have this test every 5 years.  You may need to have your cholesterol levels checked more often if:  Your lipid or cholesterol levels are high.  You are older than 40 years of age.  You are at high risk for heart disease.  What should I know about cancer screening?  Many types of cancers can be detected early and may often be prevented. Depending on your health history and family history, you may need to have cancer screening at various ages. This may include screening for:  Colorectal cancer.  Prostate cancer.  Skin cancer.  Lung  cancer.  What should I know about heart disease, diabetes, and high blood pressure?  Blood pressure and heart disease  High blood pressure causes heart disease and increases the risk of stroke. This is more likely to develop in people who have high blood pressure readings or are overweight.  Talk with your health care provider about your target blood pressure readings.  Have your blood pressure checked:  Every 3-5 years if you are 18-39 years of age.  Every year if you are 40 years old or older.  If you are between the ages of 65 and 75 and are a current or former smoker, ask your health care provider if you should have a one-time screening for abdominal aortic aneurysm (AAA).  Diabetes  Have regular diabetes screenings. This checks your fasting blood sugar level. Have the screening done:  Once every three years after age 45 if you are at a normal weight and have a low risk for diabetes.  More often and at a younger age if you are overweight or have a high risk for diabetes.  What should I know about preventing infection?  Hepatitis B  If you have a higher risk for hepatitis B, you should be screened for this virus. Talk with your health care provider to find out if you are at risk for hepatitis B infection.  Hepatitis C  Blood testing is recommended for:  Everyone born from 1945 through 1965.  Anyone with known risk factors for hepatitis C.  Sexually transmitted infections (STIs)  You should be screened each year for STIs, including gonorrhea and chlamydia, if:  You are sexually active and are younger than 24 years of age.  You are older than 24 years of age and your health care provider tells you that you are at risk for this type of infection.  Your sexual activity has changed since you were last screened, and you are at increased risk for chlamydia or gonorrhea. Ask your health care provider if you are at risk.  Ask your health care provider about whether you are at high risk for HIV. Your health care provider  may recommend a prescription medicine to help prevent HIV infection. If you choose to take medicine to prevent HIV, you should first get tested for HIV. You should then be tested every 3 months for as long as you are taking the medicine.  Follow these instructions at home:  Alcohol use  Do not drink alcohol if your health care provider tells you not to drink.  If you drink alcohol:  Limit how much you have to 0-2 drinks a day.  Know how much alcohol is in your drink. In the [place] one drink equals one 12 oz bottle of beer (355 mL), one 5 oz glass of wine (148 mL), or one 1½ oz glass of hard liquor (44 mL).  Lifestyle  Do not use any products that contain nicotine or tobacco. These products include cigarettes, chewing tobacco, and vaping devices, such as e-cigarettes. If you need help quitting, ask your health care provider.  Do not use street drugs.  Do not share needles.  Ask your health care provider for help if you need support or information about quitting drugs.  General instructions  Schedule regular health, dental, and eye exams.  Stay current with your vaccines.  Tell your health care provider if:  You often feel depressed.  You have ever been abused or do not feel safe at home.  Summary  Adopting a healthy lifestyle and getting preventive care are important in promoting health and wellness.  Follow your health care provider's instructions about healthy diet, exercising, and getting tested or screened for diseases.  Follow your health care provider's instructions on monitoring your cholesterol and blood pressure.  This information is not intended to replace advice given to you by your health care provider. Make sure you discuss any questions you have with your health care provider.  Document Revised: 05/09/2022 Document Reviewed: 05/09/2022  Elsevier Patient Education © 2022 [employer]  Preventive Care 21-39 Years Old, Male  Preventive care refers to lifestyle choices and visits with your health care  provider that can promote health and wellness. Preventive care visits are also called wellness exams.  What can I expect for my preventive care visit?  Counseling  During your preventive care visit, your health care provider may ask about your:  Medical history, including:  Past medical problems.  Family medical history.  Current health, including:  Emotional well-being.  Home life and relationship well-being.  Sexual activity.  Lifestyle, including:  Alcohol, nicotine or tobacco, and drug use.  Access to firearms.  Diet, exercise, and sleep habits.  Safety issues such as seatbelt and bike helmet use.  Sunscreen use.  Work and work environment.  Physical exam  Your health care provider may check your:  Height and weight. These may be used to calculate your BMI (body mass index). BMI is a measurement that tells if you are at a healthy weight.  Waist circumference. This measures the distance around your waistline. This measurement also tells if you are at a healthy weight and may help predict your risk of certain diseases, such as type 2 diabetes and high blood pressure.  Heart rate and blood pressure.  Body temperature.  Skin for abnormal spots.  What immunizations do I need?  Vaccines are usually given at various ages, according to a schedule. Your health care provider will recommend vaccines for you based on your age, medical history, and lifestyle or other factors, such as travel or where you work.  What tests do I need?  Screening  Your health care provider may recommend screening tests for certain conditions. This may include:  Lipid and cholesterol levels.  Diabetes screening. This is done by checking your blood sugar (glucose) after you have not eaten for a while (fasting).  Hepatitis B test.  Hepatitis C test.  HIV (human immunodeficiency virus) test.  STI (sexually transmitted infection) testing, if you are at risk.  Talk with your health care provider about your test results, treatment options, and if  necessary, the need for more tests.  Follow these instructions at home:  Eating and drinking    Eat a healthy diet that includes fresh fruits and vegetables, whole grains, lean protein, and low-fat dairy products.  Drink enough fluid to keep your urine pale yellow.  Take vitamin and mineral supplements as recommended by your health care provider.  Do not drink alcohol if your health care provider tells you not to drink.  If you drink alcohol:  Limit how much you have to 0-2 drinks a day.  Know how much alcohol is in your drink. In the [place] one drink equals one 12 oz bottle of beer (355 mL), one 5 oz glass of wine (148 mL), or one 1½ oz glass of hard liquor (44 mL).  Lifestyle  Brush your teeth every morning and night with fluoride toothpaste. Floss one time each day.  Exercise for at least 30 minutes 5 or more days each week.  Do not use any products that contain nicotine or tobacco. These products include cigarettes, chewing tobacco, and vaping devices, such as e-cigarettes. If you need help quitting, ask your health care provider.  Do not use drugs.  If you are sexually active, practice safe sex. Use a condom or other form of protection to prevent STIs.  Find healthy ways to manage stress, such as:  Meditation, yoga, or listening to music.  Journaling.  Talking to a trusted person.  Spending time with friends and family.  Minimize exposure to UV radiation to reduce your risk of skin cancer.  Safety  Always wear your seat belt while driving or riding in a vehicle.  Do not drive:  If you have been drinking alcohol. Do not ride with someone who has been drinking.  If you have been using any mind-altering substances or drugs.  While texting.  When you are tired or distracted.  Wear a helmet and other protective equipment during sports activities.  If you have firearms in your house, make sure you follow all gun safety procedures.  Seek help if you have been physically or sexually abused.  What's next?  Go to your  "health care provider once a year for an annual wellness visit.  Ask your health care provider how often you should have your eyes and teeth checked.  Stay up to date on all vaccines.  This information is not intended to replace advice given to you by your health care provider. Make sure you discuss any questions you have with your health care provider.  Document Revised: 06/15/2022 Document Reviewed: 06/15/2022  [employer] Patient Education © 2022 [employer]  Hypertension, Adult  High blood pressure (hypertension) is when the force of blood pumping through the arteries is too strong. The arteries are the blood vessels that carry blood from the heart throughout the body. Hypertension forces the heart to work harder to pump blood and may cause arteries to become narrow or stiff. Untreated or uncontrolled hypertension can cause a heart attack, heart failure, a stroke, kidney disease, and other problems.  A blood pressure reading consists of a higher number over a lower number. Ideally, your blood pressure should be below 120/80. The first (\"top\") number is called the systolic pressure. It is a measure of the pressure in your arteries as your heart beats. The second (\"bottom\") number is called the diastolic pressure. It is a measure of the pressure in your arteries as the heart relaxes.  What are the causes?  The exact cause of this condition is not known. There are some conditions that result in or are related to high blood pressure.  What increases the risk?  Some risk factors for high blood pressure are under your control. The following factors may make you more likely to develop this condition:  Smoking.  Having type 2 diabetes mellitus, high cholesterol, or both.  Not getting enough exercise or physical activity.  Being overweight.  Having too much fat, sugar, calories, or salt (sodium) in your diet.  Drinking too much alcohol.  Some risk factors for high blood pressure may be difficult or impossible to change. " Some of these factors include:  Having chronic kidney disease.  Having a family history of high blood pressure.  Age. Risk increases with age.  Race. You may be at higher risk if you are .  Gender. Men are at higher risk than women before age 45. After age 65, women are at higher risk than men.  Having obstructive sleep apnea.  Stress.  What are the signs or symptoms?  High blood pressure may not cause symptoms. Very high blood pressure (hypertensive crisis) may cause:  Headache.  Anxiety.  Shortness of breath.  Nosebleed.  Nausea and vomiting.  Vision changes.  Severe chest pain.  Seizures.  How is this diagnosed?  This condition is diagnosed by measuring your blood pressure while you are seated, with your arm resting on a flat surface, your legs uncrossed, and your feet flat on the floor. The cuff of the blood pressure monitor will be placed directly against the skin of your upper arm at the level of your heart. It should be measured at least twice using the same arm. Certain conditions can cause a difference in blood pressure between your right and left arms.  Certain factors can cause blood pressure readings to be lower or higher than normal for a short period of time:  When your blood pressure is higher when you are in a health care provider's office than when you are at home, this is called white coat hypertension. Most people with this condition do not need medicines.  When your blood pressure is higher at home than when you are in a health care provider's office, this is called masked hypertension. Most people with this condition may need medicines to control blood pressure.  If you have a high blood pressure reading during one visit or you have normal blood pressure with other risk factors, you may be asked to:  Return on a different day to have your blood pressure checked again.  Monitor your blood pressure at home for 1 week or longer.  If you are diagnosed with hypertension, you may have  other blood or imaging tests to help your health care provider understand your overall risk for other conditions.  How is this treated?  This condition is treated by making healthy lifestyle changes, such as eating healthy foods, exercising more, and reducing your alcohol intake. Your health care provider may prescribe medicine if lifestyle changes are not enough to get your blood pressure under control, and if:  Your systolic blood pressure is above 130.  Your diastolic blood pressure is above 80.  Your personal target blood pressure may vary depending on your medical conditions, your age, and other factors.  Follow these instructions at home:  Eating and drinking    Eat a diet that is high in fiber and potassium, and low in sodium, added sugar, and fat. An example eating plan is called the DASH (Dietary Approaches to Stop Hypertension) diet. To eat this way:  Eat plenty of fresh fruits and vegetables. Try to fill one half of your plate at each meal with fruits and vegetables.  Eat whole grains, such as whole-wheat pasta, brown rice, or whole-grain bread. Fill about one fourth of your plate with whole grains.  Eat or drink low-fat dairy products, such as skim milk or low-fat yogurt.  Avoid fatty cuts of meat, processed or cured meats, and poultry with skin. Fill about one fourth of your plate with lean proteins, such as fish, chicken without skin, beans, eggs, or tofu.  Avoid pre-made and processed foods. These tend to be higher in sodium, added sugar, and fat.  Reduce your daily sodium intake. Most people with hypertension should eat less than 1,500 mg of sodium a day.  Do not drink alcohol if:  Your health care provider tells you not to drink.  You are pregnant, may be pregnant, or are planning to become pregnant.  If you drink alcohol:  Limit how much you use to:  0-1 drink a day for women.  0-2 drinks a day for men.  Be aware of how much alcohol is in your drink. In the [place] one drink equals one 12 oz bottle  of beer (355 mL), one 5 oz glass of wine (148 mL), or one 1½ oz glass of hard liquor (44 mL).  Lifestyle    Work with your health care provider to maintain a healthy body weight or to lose weight. Ask what an ideal weight is for you.  Get at least 30 minutes of exercise most days of the week. Activities may include walking, swimming, or biking.  Include exercise to strengthen your muscles (resistance exercise), such as Pilates or lifting weights, as part of your weekly exercise routine. Try to do these types of exercises for 30 minutes at least 3 days a week.  Do not use any products that contain nicotine or tobacco, such as cigarettes, e-cigarettes, and chewing tobacco. If you need help quitting, ask your health care provider.  Monitor your blood pressure at home as told by your health care provider.  Keep all follow-up visits as told by your health care provider. This is important.  Medicines  Take over-the-counter and prescription medicines only as told by your health care provider. Follow directions carefully. Blood pressure medicines must be taken as prescribed.  Do not skip doses of blood pressure medicine. Doing this puts you at risk for problems and can make the medicine less effective.  Ask your health care provider about side effects or reactions to medicines that you should watch for.  Contact a health care provider if you:  Think you are having a reaction to a medicine you are taking.  Have headaches that keep coming back (recurring).  Feel dizzy.  Have swelling in your ankles.  Have trouble with your vision.  Get help right away if you:  Develop a severe headache or confusion.  Have unusual weakness or numbness.  Feel faint.  Have severe pain in your chest or abdomen.  Vomit repeatedly.  Have trouble breathing.  Summary  Hypertension is when the force of blood pumping through your arteries is too strong. If this condition is not controlled, it may put you at risk for serious complications.  Your  personal target blood pressure may vary depending on your medical conditions, your age, and other factors. For most people, a normal blood pressure is less than 120/80.  Hypertension is treated with lifestyle changes, medicines, or a combination of both. Lifestyle changes include losing weight, eating a healthy, low-sodium diet, exercising more, and limiting alcohol.  This information is not intended to replace advice given to you by your health care provider. Make sure you discuss any questions you have with your health care provider.  Document Revised: 08/28/2019 Document Reviewed: 08/28/2019  Elsevier Patient Education © 2022 [employer]

## 2023-05-10 NOTE — ASSESSMENT & PLAN NOTE
Hypertension is stable.  Continue current treatment regimen.  Dietary sodium restriction.  Continue current medications.  Blood pressure will be reassessed at the next regular appointment.

## 2023-05-11 LAB — PSA SERPL-MCNC: 3.22 NG/ML (ref 0–4)

## 2023-07-25 ENCOUNTER — TELEPHONE (OUTPATIENT)
Dept: FAMILY MEDICINE CLINIC | Facility: CLINIC | Age: 78
End: 2023-07-25

## 2023-07-25 NOTE — TELEPHONE ENCOUNTER
Caller: TONJA    Relationship:     CALLER FROM [facility]    Best call back number:      [phone]     What is the best time to reach you:     M - F BETWEEN 9:00 AND 5:30 - STANDARD EASTERN TIME    Who are you requesting to speak with (clinical staff, provider,  specific staff member):         What was the call regarding:     CALLER REQUESTED A CALL BACK WITH CONFIRMATION OF PATIENT'S MOST RECENT APPOINTMENTS FROM THE START OF 2023 FOR BLOOD PRESSURE CHECK INCLUDING THE BLOOD PRESSURE READINGS FROM THOSE APPOINTMENTS

## 2023-08-03 RX ORDER — CARVEDILOL 12.5 MG/1
TABLET ORAL
Qty: 135 TABLET | Refills: 3 | Status: SHIPPED | OUTPATIENT
Start: 2023-08-03

## 2023-09-02 ENCOUNTER — APPOINTMENT (OUTPATIENT)
Dept: CT IMAGING | Facility: HOSPITAL | Age: 78
End: 2023-09-02
Payer: MEDICARE

## 2023-09-02 ENCOUNTER — HOSPITAL ENCOUNTER (OUTPATIENT)
Facility: HOSPITAL | Age: 78
Setting detail: OBSERVATION
Discharge: HOME | End: 2023-09-04
Attending: EMERGENCY MEDICINE | Admitting: FAMILY MEDICINE
Payer: MEDICARE

## 2023-09-02 DIAGNOSIS — K92.2 ACUTE GI BLEEDING: Primary | ICD-10-CM

## 2023-09-02 LAB
ABO GROUP BLD: NORMAL
ABO GROUP BLD: NORMAL
ALBUMIN SERPL-MCNC: 4.5 G/DL (ref 3.5–5.2)
ALBUMIN/GLOB SERPL: 1.7 G/DL
ALP SERPL-CCNC: 97 U/L (ref 39–117)
ALT SERPL W P-5'-P-CCNC: 21 U/L (ref 1–41)
ANION GAP SERPL CALCULATED.3IONS-SCNC: 13 MMOL/L (ref 5–15)
AST SERPL-CCNC: 27 U/L (ref 1–40)
BASOPHILS # BLD AUTO: 0.03 10*3/MM3 (ref 0–0.2)
BASOPHILS NFR BLD AUTO: 0.4 % (ref 0–1.5)
BILIRUB SERPL-MCNC: 4.2 MG/DL (ref 0–1.2)
BLD GP AB SCN SERPL QL: NEGATIVE
BUN SERPL-MCNC: 16 MG/DL (ref 8–23)
BUN/CREAT SERPL: 18 (ref 7–25)
CALCIUM SPEC-SCNC: 10.1 MG/DL (ref 8.6–10.5)
CHLORIDE SERPL-SCNC: 106 MMOL/L (ref 98–107)
CO2 SERPL-SCNC: 22 MMOL/L (ref 22–29)
CREAT SERPL-MCNC: 0.89 MG/DL (ref 0.76–1.27)
D-LACTATE SERPL-SCNC: 1.5 MMOL/L (ref 0.5–2)
D-LACTATE SERPL-SCNC: 2.6 MMOL/L (ref 0.5–2)
DEPRECATED RDW RBC AUTO: 41.7 FL (ref 37–54)
DEVELOPER EXPIRATION DATE: ABNORMAL
DEVELOPER LOT NUMBER: ABNORMAL
EGFRCR SERPLBLD CKD-EPI 2021: 88.3 ML/MIN/1.73
EOSINOPHIL # BLD AUTO: 0.17 10*3/MM3 (ref 0–0.4)
EOSINOPHIL NFR BLD AUTO: 2.2 % (ref 0.3–6.2)
ERYTHROCYTE [DISTWIDTH] IN BLOOD BY AUTOMATED COUNT: 12.7 % (ref 12.3–15.4)
EXPIRATION DATE: ABNORMAL
FECAL OCCULT BLOOD SCREEN, POC: POSITIVE
GLOBULIN UR ELPH-MCNC: 2.7 GM/DL
GLUCOSE SERPL-MCNC: 123 MG/DL (ref 65–99)
HCT VFR BLD AUTO: 47.7 % (ref 37.5–51)
HGB BLD-MCNC: 16.2 G/DL (ref 13–17.7)
HOLD SPECIMEN: NORMAL
IMM GRANULOCYTES # BLD AUTO: 0.02 10*3/MM3 (ref 0–0.05)
IMM GRANULOCYTES NFR BLD AUTO: 0.3 % (ref 0–0.5)
LYMPHOCYTES # BLD AUTO: 1.43 10*3/MM3 (ref 0.7–3.1)
LYMPHOCYTES NFR BLD AUTO: 18.1 % (ref 19.6–45.3)
Lab: ABNORMAL
MCH RBC QN AUTO: 30.6 PG (ref 26.6–33)
MCHC RBC AUTO-ENTMCNC: 34 G/DL (ref 31.5–35.7)
MCV RBC AUTO: 90.2 FL (ref 79–97)
MONOCYTES # BLD AUTO: 0.67 10*3/MM3 (ref 0.1–0.9)
MONOCYTES NFR BLD AUTO: 8.5 % (ref 5–12)
NEGATIVE CONTROL: POSITIVE
NEUTROPHILS NFR BLD AUTO: 5.57 10*3/MM3 (ref 1.7–7)
NEUTROPHILS NFR BLD AUTO: 70.5 % (ref 42.7–76)
NRBC BLD AUTO-RTO: 0 /100 WBC (ref 0–0.2)
PLATELET # BLD AUTO: 163 10*3/MM3 (ref 140–450)
PMV BLD AUTO: 10.8 FL (ref 6–12)
POSITIVE CONTROL: POSITIVE
POTASSIUM SERPL-SCNC: 4.4 MMOL/L (ref 3.5–5.2)
PROT SERPL-MCNC: 7.2 G/DL (ref 6–8.5)
RBC # BLD AUTO: 5.29 10*6/MM3 (ref 4.14–5.8)
RH BLD: NEGATIVE
RH BLD: NEGATIVE
SODIUM SERPL-SCNC: 141 MMOL/L (ref 136–145)
T&S EXPIRATION DATE: NORMAL
WBC NRBC COR # BLD: 7.89 10*3/MM3 (ref 3.4–10.8)
WHOLE BLOOD HOLD COAG: NORMAL
WHOLE BLOOD HOLD SPECIMEN: NORMAL

## 2023-09-02 PROCEDURE — 86850 RBC ANTIBODY SCREEN: CPT

## 2023-09-02 PROCEDURE — G0378 HOSPITAL OBSERVATION PER HR: HCPCS

## 2023-09-02 PROCEDURE — 85025 COMPLETE CBC W/AUTO DIFF WBC: CPT

## 2023-09-02 PROCEDURE — 86900 BLOOD TYPING SEROLOGIC ABO: CPT

## 2023-09-02 PROCEDURE — 99285 EMERGENCY DEPT VISIT HI MDM: CPT

## 2023-09-02 PROCEDURE — 86901 BLOOD TYPING SEROLOGIC RH(D): CPT

## 2023-09-02 PROCEDURE — 80053 COMPREHEN METABOLIC PANEL: CPT

## 2023-09-02 PROCEDURE — 83605 ASSAY OF LACTIC ACID: CPT

## 2023-09-02 PROCEDURE — 82270 OCCULT BLOOD FECES: CPT

## 2023-09-02 PROCEDURE — 74178 CT ABD&PLV WO CNTR FLWD CNTR: CPT

## 2023-09-02 PROCEDURE — 25510000001 IOPAMIDOL PER 1 ML: Performed by: EMERGENCY MEDICINE

## 2023-09-02 PROCEDURE — 36415 COLL VENOUS BLD VENIPUNCTURE: CPT

## 2023-09-02 RX ORDER — SODIUM CHLORIDE, SODIUM LACTATE, POTASSIUM CHLORIDE, CALCIUM CHLORIDE 600; 310; 30; 20 MG/100ML; MG/100ML; MG/100ML; MG/100ML
75 INJECTION, SOLUTION INTRAVENOUS CONTINUOUS
Status: ACTIVE | OUTPATIENT
Start: 2023-09-03 | End: 2023-09-03

## 2023-09-02 RX ORDER — SODIUM CHLORIDE 0.9 % (FLUSH) 0.9 %
10 SYRINGE (ML) INJECTION AS NEEDED
Status: DISCONTINUED | OUTPATIENT
Start: 2023-09-02 | End: 2023-09-04 | Stop reason: HOSPADM

## 2023-09-02 RX ADMIN — IOPAMIDOL 100 ML: 755 INJECTION, SOLUTION INTRAVENOUS at 21:14

## 2023-09-02 RX ADMIN — SODIUM CHLORIDE 1000 ML: 9 INJECTION, SOLUTION INTRAVENOUS at 20:08

## 2023-09-02 NOTE — Clinical Note
Level of Care: Telemetry [5]   Diagnosis: Acute GI bleeding [[id number]]   Admitting Physician: PATI LEWIS [[id number]]   Attending Physician: PATI LEWIS [[id number]]

## 2023-09-03 LAB
ANION GAP SERPL CALCULATED.3IONS-SCNC: 6 MMOL/L (ref 5–15)
BASOPHILS # BLD AUTO: 0.02 10*3/MM3 (ref 0–0.2)
BASOPHILS NFR BLD AUTO: 0.4 % (ref 0–1.5)
BUN SERPL-MCNC: 12 MG/DL (ref 8–23)
BUN/CREAT SERPL: 14.5 (ref 7–25)
CALCIUM SPEC-SCNC: 9.3 MG/DL (ref 8.6–10.5)
CHLORIDE SERPL-SCNC: 107 MMOL/L (ref 98–107)
CO2 SERPL-SCNC: 26 MMOL/L (ref 22–29)
CREAT SERPL-MCNC: 0.83 MG/DL (ref 0.76–1.27)
DEPRECATED RDW RBC AUTO: 41.6 FL (ref 37–54)
EGFRCR SERPLBLD CKD-EPI 2021: 90.1 ML/MIN/1.73
EOSINOPHIL # BLD AUTO: 0.12 10*3/MM3 (ref 0–0.4)
EOSINOPHIL NFR BLD AUTO: 2.2 % (ref 0.3–6.2)
ERYTHROCYTE [DISTWIDTH] IN BLOOD BY AUTOMATED COUNT: 12.6 % (ref 12.3–15.4)
GLUCOSE SERPL-MCNC: 98 MG/DL (ref 65–99)
HCT VFR BLD AUTO: 41.2 % (ref 37.5–51)
HCT VFR BLD AUTO: 43.5 % (ref 37.5–51)
HGB BLD-MCNC: 13.9 G/DL (ref 13–17.7)
HGB BLD-MCNC: 14.6 G/DL (ref 13–17.7)
IMM GRANULOCYTES # BLD AUTO: 0.01 10*3/MM3 (ref 0–0.05)
IMM GRANULOCYTES NFR BLD AUTO: 0.2 % (ref 0–0.5)
LYMPHOCYTES # BLD AUTO: 1.02 10*3/MM3 (ref 0.7–3.1)
LYMPHOCYTES NFR BLD AUTO: 18.7 % (ref 19.6–45.3)
MCH RBC QN AUTO: 30.8 PG (ref 26.6–33)
MCHC RBC AUTO-ENTMCNC: 33.7 G/DL (ref 31.5–35.7)
MCV RBC AUTO: 91.2 FL (ref 79–97)
MONOCYTES # BLD AUTO: 0.57 10*3/MM3 (ref 0.1–0.9)
MONOCYTES NFR BLD AUTO: 10.5 % (ref 5–12)
NEUTROPHILS NFR BLD AUTO: 3.71 10*3/MM3 (ref 1.7–7)
NEUTROPHILS NFR BLD AUTO: 68 % (ref 42.7–76)
NRBC BLD AUTO-RTO: 0 /100 WBC (ref 0–0.2)
PLATELET # BLD AUTO: 128 10*3/MM3 (ref 140–450)
PMV BLD AUTO: 10.9 FL (ref 6–12)
POTASSIUM SERPL-SCNC: 4.1 MMOL/L (ref 3.5–5.2)
RBC # BLD AUTO: 4.52 10*6/MM3 (ref 4.14–5.8)
SODIUM SERPL-SCNC: 139 MMOL/L (ref 136–145)
WBC NRBC COR # BLD: 5.45 10*3/MM3 (ref 3.4–10.8)

## 2023-09-03 PROCEDURE — 96366 THER/PROPH/DIAG IV INF ADDON: CPT

## 2023-09-03 PROCEDURE — G0378 HOSPITAL OBSERVATION PER HR: HCPCS

## 2023-09-03 PROCEDURE — 80048 BASIC METABOLIC PNL TOTAL CA: CPT | Performed by: INTERNAL MEDICINE

## 2023-09-03 PROCEDURE — 85025 COMPLETE CBC W/AUTO DIFF WBC: CPT | Performed by: INTERNAL MEDICINE

## 2023-09-03 PROCEDURE — 85018 HEMOGLOBIN: CPT | Performed by: INTERNAL MEDICINE

## 2023-09-03 PROCEDURE — 85014 HEMATOCRIT: CPT | Performed by: INTERNAL MEDICINE

## 2023-09-03 PROCEDURE — 96365 THER/PROPH/DIAG IV INF INIT: CPT

## 2023-09-03 PROCEDURE — 99204 OFFICE O/P NEW MOD 45 MIN: CPT | Performed by: NURSE PRACTITIONER

## 2023-09-03 RX ORDER — ENALAPRIL MALEATE 5 MG/1
5 TABLET ORAL DAILY
Status: DISCONTINUED | OUTPATIENT
Start: 2023-09-03 | End: 2023-09-04

## 2023-09-03 RX ORDER — BISACODYL 10 MG
10 SUPPOSITORY, RECTAL RECTAL DAILY PRN
Status: DISCONTINUED | OUTPATIENT
Start: 2023-09-03 | End: 2023-09-04

## 2023-09-03 RX ORDER — CLONIDINE HYDROCHLORIDE 0.1 MG/1
0.15 TABLET ORAL EVERY 12 HOURS SCHEDULED
Status: DISCONTINUED | OUTPATIENT
Start: 2023-09-03 | End: 2023-09-04 | Stop reason: HOSPADM

## 2023-09-03 RX ORDER — SODIUM CHLORIDE 9 MG/ML
40 INJECTION, SOLUTION INTRAVENOUS AS NEEDED
Status: DISCONTINUED | OUTPATIENT
Start: 2023-09-03 | End: 2023-09-04 | Stop reason: HOSPADM

## 2023-09-03 RX ORDER — ACETAMINOPHEN 325 MG/1
650 TABLET ORAL EVERY 4 HOURS PRN
Status: DISCONTINUED | OUTPATIENT
Start: 2023-09-03 | End: 2023-09-04 | Stop reason: HOSPADM

## 2023-09-03 RX ORDER — ACETAMINOPHEN 160 MG/5ML
650 SOLUTION ORAL EVERY 4 HOURS PRN
Status: DISCONTINUED | OUTPATIENT
Start: 2023-09-03 | End: 2023-09-04 | Stop reason: HOSPADM

## 2023-09-03 RX ORDER — ACETAMINOPHEN 650 MG/1
650 SUPPOSITORY RECTAL EVERY 4 HOURS PRN
Status: DISCONTINUED | OUTPATIENT
Start: 2023-09-03 | End: 2023-09-04 | Stop reason: HOSPADM

## 2023-09-03 RX ORDER — POLYETHYLENE GLYCOL 3350 17 G/17G
17 POWDER, FOR SOLUTION ORAL DAILY PRN
Status: DISCONTINUED | OUTPATIENT
Start: 2023-09-03 | End: 2023-09-04

## 2023-09-03 RX ORDER — LORAZEPAM 0.5 MG/1
0.5 TABLET ORAL EVERY 8 HOURS PRN
Status: DISCONTINUED | OUTPATIENT
Start: 2023-09-03 | End: 2023-09-04 | Stop reason: HOSPADM

## 2023-09-03 RX ORDER — BISACODYL 5 MG/1
5 TABLET, DELAYED RELEASE ORAL DAILY PRN
Status: DISCONTINUED | OUTPATIENT
Start: 2023-09-03 | End: 2023-09-04

## 2023-09-03 RX ORDER — CARVEDILOL 6.25 MG/1
6.25 TABLET ORAL 2 TIMES DAILY WITH MEALS
Status: DISCONTINUED | OUTPATIENT
Start: 2023-09-03 | End: 2023-09-04 | Stop reason: HOSPADM

## 2023-09-03 RX ORDER — AMOXICILLIN 250 MG
2 CAPSULE ORAL 2 TIMES DAILY
Status: DISCONTINUED | OUTPATIENT
Start: 2023-09-03 | End: 2023-09-04

## 2023-09-03 RX ORDER — CHOLECALCIFEROL (VITAMIN D3) 125 MCG
5 CAPSULE ORAL NIGHTLY PRN
Status: DISCONTINUED | OUTPATIENT
Start: 2023-09-03 | End: 2023-09-04 | Stop reason: HOSPADM

## 2023-09-03 RX ORDER — SODIUM CHLORIDE 0.9 % (FLUSH) 0.9 %
10 SYRINGE (ML) INJECTION AS NEEDED
Status: DISCONTINUED | OUTPATIENT
Start: 2023-09-03 | End: 2023-09-04 | Stop reason: HOSPADM

## 2023-09-03 RX ORDER — SODIUM CHLORIDE 0.9 % (FLUSH) 0.9 %
10 SYRINGE (ML) INJECTION EVERY 12 HOURS SCHEDULED
Status: DISCONTINUED | OUTPATIENT
Start: 2023-09-03 | End: 2023-09-04 | Stop reason: HOSPADM

## 2023-09-03 RX ORDER — ONDANSETRON 2 MG/ML
4 INJECTION INTRAMUSCULAR; INTRAVENOUS EVERY 6 HOURS PRN
Status: DISCONTINUED | OUTPATIENT
Start: 2023-09-03 | End: 2023-09-04 | Stop reason: HOSPADM

## 2023-09-03 RX ADMIN — Medication 5 MG: at 00:57

## 2023-09-03 RX ADMIN — Medication 10 ML: at 00:47

## 2023-09-03 RX ADMIN — Medication 10 ML: at 20:30

## 2023-09-03 RX ADMIN — LORAZEPAM 0.5 MG: 0.5 TABLET ORAL at 23:43

## 2023-09-03 RX ADMIN — NICARDIPINE HYDROCHLORIDE 5 MG/HR: 25 INJECTION, SOLUTION INTRAVENOUS at 00:37

## 2023-09-03 RX ADMIN — CLONIDINE HYDROCHLORIDE 0.15 MG: 0.1 TABLET ORAL at 20:22

## 2023-09-03 RX ADMIN — Medication 5 MG: at 23:43

## 2023-09-03 RX ADMIN — CLONIDINE HYDROCHLORIDE 0.15 MG: 0.1 TABLET ORAL at 00:38

## 2023-09-03 RX ADMIN — SODIUM CHLORIDE, POTASSIUM CHLORIDE, SODIUM LACTATE AND CALCIUM CHLORIDE 75 ML/HR: 600; 310; 30; 20 INJECTION, SOLUTION INTRAVENOUS at 00:48

## 2023-09-03 RX ADMIN — CARVEDILOL 6.25 MG: 6.25 TABLET, FILM COATED ORAL at 20:21

## 2023-09-03 RX ADMIN — LORAZEPAM 0.5 MG: 0.5 TABLET ORAL at 00:57

## 2023-09-03 NOTE — H&P
"    [facility] Medicine Services  HISTORY AND PHYSICAL    Patient Name: Michael Mallory  : 1945  MRN: [id number]  Primary Care Physician: Jordi Ruiz MD  Date of admission: 2023      Subjective   Subjective     Chief Complaint:  brbpr    HPI:  Michael Mallory is a 77 y.o. male with past medical history of paroxysmal atrial fibrillation not on anticoagulation but is on 81 mg aspirin, history of TIA, mitral valve insufficiency with history of mitral valve repair, pulmonary hypertension, anxiety, and essential hypertension who presents to the ER today with complaints of bright red blood per rectum.    Patient states that at approximately 4:30 PM today he began having bright red blood per rectum.  Reports he felt the urge to urinate and while in the bathroom checked his underwear which was noted to have large amount of bright red blood with clots.  No recurrence since this time.  He denies any prior history of this.  Denies any abdominal or rectal pain.  He reports that he previously had a colonoscopy about 4 to 5 years ago with \"small polyps\".  He is not on any anticoagulants.  Only takes 81 mg aspirin.  Does not take any other NSAIDs regularly.  Patient noted to be anxious and hypertensive in the ER.  ER discussed case with Dr. Amanda of gastroenterology who recommends admission.    Review of Systems   Gen- No fevers, chills  CV- No chest pain, palpitations  Resp- No cough, dyspnea  GI- No N/V/D, abd pain      Personal History     Past Medical History:   Diagnosis Date    Anxiety     Arrhythmia     Atrial fibrillation 2016    CHF (congestive heart failure) 2018    Heart murmur 1997    Hypertension 2016    Mini stroke 2022    TIA    Mitral valve disorder     Palpitations 2016    Stroke 2015    TIA    TIA (transient ischemic attack)              Past Surgical History:   Procedure Laterality Date    ABLATION OF DYSRHYTHMIC FOCUS  2018    Perhaps    AORTIC " VALVE SURGERY  2018    ATRIAL APPENDAGE EXCLUSION LEFT WITH TRANSESOPHAGEAL ECHOCARDIOGRAM      CARDIAC CATHETERIZATION  2018    Perhaps    CARDIAC VALVE REPLACEMENT  2018    Mitral valve repair    COLONOSCOPY  2013/14    HEMORRHOIDECTOMY      HERNIA REPAIR      MITRAL VALVE REPAIR/REPLACEMENT         Family History: family history includes Cancer in his mother; Heart attack in his father; Transient ischemic attack in his mother.     Social History:  reports that he has never smoked. He has never used smokeless tobacco. He reports that he does not currently use alcohol. He reports that he does not use drugs.  Social History     Social History Narrative    Caffeine: 1 serving per day.       Medications:  Available home medication information reviewed.  (Not in a hospital admission)      No Known Allergies    Objective   Objective     Vital Signs:   Temp:  [97.7 °F (36.5 °C)] 97.7 °F (36.5 °C)  Heart Rate:  [64-85] 74  Resp:  [18] 18  BP: (155-199)/(106-162) 188/139       Physical Exam   Constitutional: Awake, alert, nontoxic, anxious  Eyes: PERRLA, sclerae anicteric, no conjunctival injection  HENT: NCAT, mucous membranes moist  Neck: Supple, no thyromegaly, no lymphadenopathy, trachea midline  Respiratory: Clear to auscultation bilaterally, nonlabored respirations   Cardiovascular: Regular rate, irregularly irregular rhythm, systolic murmur, palpable pedal pulses bilaterally  Gastrointestinal: Positive bowel sounds, soft, nontender, nondistended  Musculoskeletal: No bilateral ankle edema, no clubbing or cyanosis to extremities  Psychiatric: Anxious affect, cooperative  Neurologic: Oriented x 3, strength symmetric in all extremities, Cranial Nerves grossly intact to confrontation, speech clear  Skin: No rashes        Result Review:  I have personally reviewed the results from the time of this admission to 9/2/2023 23:53 EDT and agree with these findings:  [x]  Laboratory list / accordion  []  Microbiology  [x]   Radiology  []  EKG/Telemetry   []  Cardiology/Vascular   []  Pathology  []  Old records  []  Other:  Most notable findings include: CT of the abdomen pelvis without any evidence of acute extravasation.  Lactic acid downtrending from 2.6-1.5.  Hemoglobin is 16.2        LAB RESULTS:      Lab 09/02/23 2219 09/02/23 1908   WBC  --  7.89   HEMOGLOBIN  --  16.2   HEMATOCRIT  --  47.7   PLATELETS  --  163   NEUTROS ABS  --  5.57   IMMATURE GRANS (ABS)  --  0.02   LYMPHS ABS  --  1.43   MONOS ABS  --  0.67   EOS ABS  --  0.17   MCV  --  90.2   LACTATE 1.5 2.6*         Lab 09/02/23 1905   SODIUM 141   POTASSIUM 4.4   CHLORIDE 106   CO2 22.0   ANION GAP 13.0   BUN 16   CREATININE 0.89   EGFR 88.3   GLUCOSE 123*   CALCIUM 10.1         Lab 09/02/23 1905   TOTAL PROTEIN 7.2   ALBUMIN 4.5   GLOBULIN 2.7   ALT (SGPT) 21   AST (SGOT) 27   BILIRUBIN 4.2*   ALK PHOS 97                 Lab 09/02/23 2005 09/02/23 1926   ABO TYPING O O   RH TYPING Negative Negative   ANTIBODY SCREEN  --  Negative         UA          5/10/2023    09:49   Urinalysis   Ketones, UA Negative    Leukocytes, UA Negative        Microbiology Results (last 10 days)       ** No results found for the last 240 hours. **            CT Abdomen Pelvis With & Without Contrast    Result Date: 9/2/2023  CT ABDOMEN PELVIS W WO CONTRAST Date of Exam: 9/2/2023 8:47 PM EDT Indication: GI bleed, lower. Comparison: None available. Technique: Axial CT images were obtained of the abdomen and pelvis before and after the uneventful intravenous administration of intravenous contrast. Sagittal and coronal reconstructions were performed.  Automated exposure control and iterative reconstruction methods were used. Findings: Lung Bases:   The visualized lung bases and lower mediastinal structures are unremarkable. The heart appears enlarged. Liver: Liver is normal in size and CT density. No focal lesions. Biliary/Gallbladder:  The gallbladder is normal without evidence of  radiopaque stones. The biliary tree is nondilated. Spleen: Spleen is normal in size and CT density. Pancreas:  Pancreas is normal. There is no evidence of pancreatic mass or peripancreatic fluid. Kidneys:  Kidneys are normal in size. There are no stones or hydronephrosis. Simple cyst seen arising from the left kidney. Adrenals:  Adrenal glands are unremarkable. Retroperitoneal/Lymph Nodes/Vasculature:  No retroperitoneal adenopathy is identified. No evidence of contrast extravasation. Mild atherosclerotic calcifications are present. Gastrointestinal/Mesentery:  The bowel loops are non-dilated without wall thickening or mass. The appendix appears within normal limits. No evidence of obstruction. No free air. No mesenteric fluid collections identified. Moderate to large stool burden present. Bladder:  The bladder is normal. Genital:   The prostate appears enlarged. No evidence of adenopathy. No significant free fluid. Bony Structures:   Visualized bony structures are consistent with the patient's age.     Impression: Impression: 1. No evidence of active contrast extravasation to suggest GI bleed. Moderate to large stool burden present. No acute intra-abdominal or intrapelvic process. 2. Ancillary findings as described above. Electronically Signed: Hollie Maza MD  9/2/2023 9:36 PM EDT  Workstation ID: [id number]     Results for orders placed during the hospital encounter of 12/30/22    Adult Transthoracic Echo Complete W/ Cont if Necessary Per Protocol    Interpretation Summary    Left ventricular systolic function is normal. Calculated left ventricular EF = 57% Left ventricular ejection fraction appears to be 56 - 60%.    Left ventricular wall thickness is consistent with mild concentric hypertrophy.    Left atrial volume is severely increased.    The right atrial cavity is dilated.    Severe mitral valve regurgitation is present with an anteriorly-directed jet noted.    There is a mitral valve ring present.     Estimated right ventricular systolic pressure from tricuspid regurgitation is normal (<35 mmHg). Calculated right ventricular systolic pressure from tricuspid regurgitation is 33 mmHg.      Assessment & Plan   Assessment & Plan     Active Hospital Problems    Diagnosis  POA    **Acute GI bleeding [K92.2]  Yes    Premature ventricular contractions [I49.3]  Yes    Cardiomyopathy [I42.9]  Yes    H/O mitral valve repair [Z98.890]  Not Applicable    Anxiety [F41.9]  Yes     Formatting of this note might be different from the original.  History: Pt. notes he's a very anxious person by nature.  No pre-op anxiolytics.  Assessment: Pt. has phobia about taking medications and prefers to let his body heal naturally with food and rest.  Plan: Continue Xanax prn.  Work with patient re: necessary medications to optimize health outcomes post-op.      Mitral valve insufficiency [I34.0]  Yes    Pulmonary hypertension [I27.20]  Yes    Heart murmur [R01.1]  Yes    Hypertension [I10]  Yes    Paroxysmal atrial fibrillation [I48.0]  Yes    Mitral valve disorder [I05.9]  Yes       GI bleed, acute  --suspected lower GI bleed  --GI consult  --Monitor serial H&H  --Type and screen  --Transfuse as needed for Hb <7 or symptomatic with active bleeding  --NPO     Essential hypertension/hypertensive urgency  -- Cardene drip  -- Restart home meds  -- Anxiety likely contributing    Paroxysmal atrial fibrillation  -- Not on anticoagulation  -- Continue Coreg  -- Currently in rate controlled A-fib    Mitral valve insufficiency status post history of mitral valve replacement  Pulmonary hypertension  Anxiety  --Continue home meds      Total time spent: 55  Time spent includes time reviewing chart, face-to-face time, counseling patient/family/caregiver, ordering medications/tests/procedures, communicating with other health care professionals, documenting clinical information in the electronic health record, and coordination of care.      DVT  prophylaxis:  scds      CODE STATUS:  full  There are no questions and answers to display.       Expected Discharge   Expected discharge date/ time has not been documented.     Jeyson Delatorre DO  09/02/23

## 2023-09-03 NOTE — CONSULTS
[facility]    Referring Provider: No ref. provider found    PCP: Jordi Ruiz MD    Reason for Consultation: GI bleed    Chief complaint: Lower GI bleed    History of present illness:    Michael Mallory is a 77 y.o. male who is admitted with acute onset of lower gastrointestinal bleeding at 4:30 PM yesterday.  Patient notes that he went to the restroom to urinate when he noticed blood seeping from his rectum with a small amount of blood clot in his underwear.  Does not report any abdominal pain or cramping associated with onset.  No prior history gastrointestinal bleeding.  Reports had a colonoscopy 5 years ago with only small polyps but no other abnormalities.  Does not endorse any nausea, vomiting, diarrhea, abdominal pain, shortness of breath, chest pain, or fever/chills associated with onset.  No recent change in bowel habits or stool caliber.  No unintentional weight loss endorsed.  Denies any use of NSAIDs and only uses a daily baby aspirin. Past medical, surgical, social, and family histories are reviewed for accuracy.  No documented alleviating or exacerbating factors.  Does not endorse pain at time of exam.    Allergies:  Patient has no known allergies.    Scheduled Meds:  carvedilol, 6.25 mg, Oral, BID With Meals  cloNIDine, 0.15 mg, Oral, Q12H  enalapril, 5 mg, Oral, Daily  senna-docusate sodium, 2 tablet, Oral, BID  sodium chloride, 10 mL, Intravenous, Q12H         Infusions:  lactated ringers, 75 mL/hr, Last Rate: 75 mL/hr (09/03/23 0048)        PRN Meds:    acetaminophen **OR** acetaminophen **OR** acetaminophen    senna-docusate sodium **AND** polyethylene glycol **AND** bisacodyl **AND** bisacodyl    LORazepam    melatonin    ondansetron    sodium chloride    sodium chloride    sodium chloride    Home Meds:  Medications Prior to Admission   Medication Sig Dispense Refill Last Dose    aspirin 81 MG EC tablet Take 1 tablet by mouth Daily.       atorvastatin (LIPITOR) 40 MG tablet  Take 1 tablet by mouth Daily. 30 tablet 11     carvedilol (COREG) 12.5 MG tablet TAKE ONE-HALF (1/2) TABLET (6.25 MG) IN THE MORNING AND 1 TABLET (12.5 MG) IN THE EVENING 135 tablet 3     cloNIDine (CATAPRES) 0.1 MG tablet TAKE ONE AND ONE-HALF TABLETS (0.15 MG) EVERY MORNING AND TAKE ONE TABLET AT BEDTIME 225 tablet 3     enalapril (VASOTEC) 5 MG tablet TAKE 1 TABLET DAILY 90 tablet 1        ROS: Review of Systems   Constitutional:  Positive for activity change and appetite change. Negative for chills, diaphoresis, fatigue, fever and unexpected weight change.   HENT:  Negative for sore throat, trouble swallowing and voice change.    Eyes: Negative.    Respiratory:  Negative for apnea, cough, choking, chest tightness, shortness of breath, wheezing and stridor.    Cardiovascular:  Negative for chest pain, palpitations and leg swelling.   Gastrointestinal:  Positive for anal bleeding and blood in stool. Negative for abdominal distention, abdominal pain, constipation, diarrhea, nausea, rectal pain and vomiting.   Endocrine: Negative.    Genitourinary: Negative.    Musculoskeletal: Negative.    Skin: Negative.    Allergic/Immunologic: Negative.    Neurological: Negative.    Hematological: Negative.    Psychiatric/Behavioral: Negative.     All other systems reviewed and are negative.    PAST MED HX:  Past Medical History:   Diagnosis Date    Anxiety     Arrhythmia     Atrial fibrillation 08/22/2016    CHF (congestive heart failure) 2018    Heart murmur 1997    Hypertension 08/22/2016    Mini stroke 12/2022    TIA    Mitral valve disorder     Palpitations 08/22/2016    Stroke 2015    TIA    TIA (transient ischemic attack)        PAST SURG HX:  Past Surgical History:   Procedure Laterality Date    ABLATION OF DYSRHYTHMIC FOCUS  2018    Perhaps    AORTIC VALVE SURGERY  2018    ATRIAL APPENDAGE EXCLUSION LEFT WITH TRANSESOPHAGEAL ECHOCARDIOGRAM      CARDIAC CATHETERIZATION  2018    Perhaps    CARDIAC VALVE REPLACEMENT  2018  "   Mitral valve repair    COLONOSCOPY  2013/14    HEMORRHOIDECTOMY      HERNIA REPAIR      MITRAL VALVE REPAIR/REPLACEMENT         FAM HX:  Family History   Problem Relation Age of Onset    Heart attack Father         46    Transient ischemic attack Mother     Cancer Mother         83       SOC HX:  Social History     Socioeconomic History    Marital status:    Tobacco Use    Smoking status: Never    Smokeless tobacco: Never   Vaping Use    Vaping Use: Never used   Substance and Sexual Activity    Alcohol use: Not Currently     Comment: Insignificant amounts    Drug use: No    Sexual activity: Yes     Partners: Female       PHYSICAL EXAM  /77 (BP Location: Left arm, Patient Position: Lying)   Pulse 60   Temp 96.7 °F (35.9 °C) (Oral)   Resp 18   Ht 172.7 cm (67.99\")   Wt 63.5 kg (139 lb 14.4 oz)   SpO2 97%   BMI 21.28 kg/m²   Wt Readings from Last 3 Encounters:   09/03/23 63.5 kg (139 lb 14.4 oz)   06/27/23 63.5 kg (140 lb)   05/10/23 64.9 kg (143 lb)   ,body mass index is 21.28 kg/m².  Physical Exam  Vitals and nursing note reviewed.   Constitutional:       General: He is not in acute distress.     Appearance: Normal appearance. He is normal weight. He is ill-appearing. He is not toxic-appearing.   HENT:      Head: Normocephalic and atraumatic.   Eyes:      General: No scleral icterus.     Extraocular Movements: Extraocular movements intact.      Conjunctiva/sclera: Conjunctivae normal.      Pupils: Pupils are equal, round, and reactive to light.   Cardiovascular:      Rate and Rhythm: Normal rate and regular rhythm.      Pulses: Normal pulses.      Heart sounds: Murmur heard.   Pulmonary:      Effort: Pulmonary effort is normal. No respiratory distress.      Breath sounds: Normal breath sounds.   Abdominal:      General: Abdomen is flat. Bowel sounds are normal. There is no distension.      Palpations: Abdomen is soft. There is no mass.      Tenderness: There is no abdominal tenderness. There is " no guarding or rebound.      Hernia: No hernia is present.   Genitourinary:     Rectum: Guaiac result positive.   Musculoskeletal:         General: Normal range of motion.      Right lower leg: No edema.      Left lower leg: No edema.   Skin:     General: Skin is warm and dry.      Coloration: Skin is pale. Skin is not jaundiced.   Neurological:      General: No focal deficit present.      Mental Status: He is alert and oriented to person, place, and time.   Psychiatric:         Mood and Affect: Mood normal.         Behavior: Behavior normal.         Thought Content: Thought content normal.         Judgment: Judgment normal.       Results Review:   I reviewed the patient's new clinical results.  I reviewed the patient's new imaging results and agree with the interpretation.  I reviewed the patient's other test results and agree with the interpretation  I personally viewed and interpreted the patient's EKG/Telemetry data    Lab Results   Component Value Date    WBC 5.45 09/03/2023    HGB 13.9 09/03/2023    HGB 14.6 09/03/2023    HGB 16.2 09/02/2023    HCT 41.2 09/03/2023    MCV 91.2 09/03/2023     (L) 09/03/2023       Lab Results   Component Value Date    INR 1.1 12/05/2022    INR 3.10 (A) 11/26/2018    INR 4.00 (A) 11/19/2018       Lab Results   Component Value Date    GLUCOSE 98 09/03/2023    BUN 12 09/03/2023    CREATININE 0.83 09/03/2023    EGFRIFNONA 68 09/11/2019    EGFRIFAFRI >60 09/14/2018    BCR 14.5 09/03/2023     09/03/2023    K 4.1 09/03/2023    CO2 26.0 09/03/2023    CALCIUM 9.3 09/03/2023    ALBUMIN 4.5 09/02/2023    ALKPHOS 97 09/02/2023    BILITOT 4.2 (H) 09/02/2023    ALT 21 09/02/2023    AST 27 09/02/2023       CT Abdomen Pelvis With & Without Contrast    Result Date: 9/2/2023  CT ABDOMEN PELVIS W WO CONTRAST Date of Exam: 9/2/2023 8:47 PM EDT Indication: GI bleed, lower. Comparison: None available. Technique: Axial CT images were obtained of the abdomen and pelvis before and after the  uneventful intravenous administration of intravenous contrast. Sagittal and coronal reconstructions were performed.  Automated exposure control and iterative reconstruction methods were used. Findings: Lung Bases:   The visualized lung bases and lower mediastinal structures are unremarkable. The heart appears enlarged. Liver: Liver is normal in size and CT density. No focal lesions. Biliary/Gallbladder:  The gallbladder is normal without evidence of radiopaque stones. The biliary tree is nondilated. Spleen: Spleen is normal in size and CT density. Pancreas:  Pancreas is normal. There is no evidence of pancreatic mass or peripancreatic fluid. Kidneys:  Kidneys are normal in size. There are no stones or hydronephrosis. Simple cyst seen arising from the left kidney. Adrenals:  Adrenal glands are unremarkable. Retroperitoneal/Lymph Nodes/Vasculature:  No retroperitoneal adenopathy is identified. No evidence of contrast extravasation. Mild atherosclerotic calcifications are present. Gastrointestinal/Mesentery:  The bowel loops are non-dilated without wall thickening or mass. The appendix appears within normal limits. No evidence of obstruction. No free air. No mesenteric fluid collections identified. Moderate to large stool burden present. Bladder:  The bladder is normal. Genital:   The prostate appears enlarged. No evidence of adenopathy. No significant free fluid. Bony Structures:   Visualized bony structures are consistent with the patient's age.     Impression: 1. No evidence of active contrast extravasation to suggest GI bleed. Moderate to large stool burden present. No acute intra-abdominal or intrapelvic process. 2. Ancillary findings as described above. Electronically Signed: Hollie Maza MD  2023 9:36 PM EDT  Workstation ID: [id number]    EEG    Result Date: 2023  Table formatting from the original result was not included. Electroencephalogram Report Patient: Michael Mallory : 1945 MRN: [id number]  SEX: male Referring Provider: HOSSEIN Garcia EEG Reading Physician: Timur Moon Study Type: R-OP Begin Date: 8/8/2023 Begin Time: 12:46 PM End Date: 8/8/2023 End Time: 1:10 PM Total EEG Recording Time: 25 minutes Indication for study: Concern for seizures SEIZURE HISTORY: Mr. Mallory is a 78yo male who we follow for history of TIA with recurrence and concern for seizures. Current Relevant Therapy and Side Effects: Medications: Current Outpatient Medications Medication Sig Dispense Refill  atorvastatin (Lipitor) 40 MG tablet Take 1 tablet (40 mg) by mouth 1 (one) time each day. 90 tablet 3  carvedilol (Coreg) 12.5 MG tablet      carvedilol (Coreg) 6.25 MG tablet Take by mouth 2 (two) times a day with meals.    cloNIDine (Catapres) 0.1 MG tablet 0.1 mg 1 (one) time each day. Take 1 in p.m.    cloNIDine (Catapres) 0.1 MG tablet Take 0.1 mg by mouth 1 (one) time each day. Take 1 and 1/2 in the a.m.    enalapril (Vasotec) 5 MG tablet Take 2.5 mg by mouth 1 (one) time each day.   No current facility-administered medications for this encounter. ROUTINE-EEG MONITORING METHODOLOGY: This is a routine  EEG with monitoring using 21-channel recordings (unless otherwise specified) in a 10/20 system with [employer] software and hardware. Additional electrodes: none FT9/FT10: yes Other: none The seizure detection computer was used for detection of ictal discharges (subclinical and clinical), interictal discharges, and to record ictal events that were documented by depression of the event button in the patient's room. Analyses of the monitoring data were performed using the following techniques: 1. Review of the relevant video-EEG data. 2. Review of events detected by the computer system in detail. 3. Review of clinical seizures, with both detailed review of EEG and video and playback using multiple montages. A variety of referential and bipolar montages were used. Results of the monitoring were related to the treating team  frequently throughout the study, at least once a day to help guide treatment via verbal or written communication. Updates and response to treatment were communicated as requested by the requesting physician or the team. CLINICAL AND EEG ANALYSIS INTERICTAL EEG DESCRIPTION: State of patient: awake Awake  100% Sleep: none Awake background: 10-11 Hz PDR noted. In anterior regions frontocentral beta was noted. Posterior dominant rhythm: 10-11  Hz Voltage: 30-40 uV Organization: well organised Reactivity to eye opening/closure: good Sleep background: stages of sleep were not attained. ACTIVATION PROCEDURES: none Hyperventilation: No hyperventilation was performed Photic stimulation: Photic stimulation was performed and driving response   noted from 9 to 30 Hz. It was symmetrical and bioccipital. Reactivity to stimulation: reactive NONEPILEPTIFORM INTERICTAL ABNORMALITIES None EPILEPTIFORM INTERICTAL ABNORMALITIES None ICTAL / EVENT DESCRIPTION: Clinical Description: none EEG Description: none Abnormalities: None CLINICAL INTERPRETATION: This is a normal awake EEG . Absence of epileptiform discharges does not rule out epilepsy. If clinical suspicion is high , and EMU admission may be requested for characterization of spells. Timur Moon MD  [facility] [phone]     No results found for: COVID19    ASSESSMENTS/PLANS  1.  Acute lower gastrointestinal bleeding, suspect outlet source  2.  Hypertensive urgency, ongoing  3.  Atrial fibrillation, not anticoagulated  4.  Valvular heart disease    Michael Mallory is a 77 y.o. male who presents to hospital with acute onset of lower gastrointestinal bleeding.  Reports some incontinence with seepage of blood and clot product in his trousers yesterday and further bloody bowel movements.  Patient describes brown stool intermixed with blood and subsequent bowel movements.  Suspect outlet source i.e. hemorrhoidal in nature.   Recommend conservative management with Anusol and fiber supplementation; if no improvement in symptoms overnight, will need colonoscopy on Tuesday.    >>> Okay to advance diet as tolerated  >>> Anusol suppositories twice daily x5 days  >>> Daily MiraLAX  >>> Obtain GI PCR to rule out any other infectious etiologies.    I discussed the patient's findings and my recommendations with patient, family, and nursing staff    ANA Cobb  09/03/23  10:02 EDT

## 2023-09-03 NOTE — PROGRESS NOTES
[facility] Medicine Services  PROGRESS NOTE    Patient Name: Michael Mallory  : 1945  MRN: [id number]    Date of Admission: 2023  Primary Care Physician: Jordi Ruiz MD    Subjective   Subjective     CC:  GI bleed    HPI:  Patient presented with bright red blood per rectum.  GI consult is pending.  He tells me his last bowel movement was around 10 PM last night and it did have blood in it.  It was bright red.  He does have a history of hemorrhoids.  He does not feel weak or tired.  He just wants to eat something.  He does complain of left-sided abdominal cramping.    ROS:  Gen- No fevers, chills  CV- No chest pain, palpitations  Resp- No cough, dyspnea  GI-positive left-sided abdominal pain and blood in stool and blood on his underwear       Objective   Objective     Vital Signs:   Temp:  [96.7 °F (35.9 °C)-97.9 °F (36.6 °C)] 96.7 °F (35.9 °C)  Heart Rate:  [59-91] 60  Resp:  [16-18] 18  BP: ()/() 105/77     Physical Exam:  Constitutional: No acute distress, awake, alert  HENT: NCAT, mucous membranes moist  Respiratory: Clear to auscultation bilaterally, respiratory effort normal   Cardiovascular: RRR  Gastrointestinal: Positive bowel sounds, soft, nontender, nondistended  Musculoskeletal: No bilateral ankle edema  Psychiatric: Appropriate affect, cooperative  Neurologic: Oriented x 3, strength symmetric in all extremities, Cranial Nerves grossly intact to confrontation, speech clear  Skin: No rashes      Results Reviewed:  LAB RESULTS:      Lab 23  0033 239 23  1908   WBC  --   --  7.89   HEMOGLOBIN 14.6  --  16.2   HEMATOCRIT 43.5  --  47.7   PLATELETS  --   --  163   NEUTROS ABS  --   --  5.57   IMMATURE GRANS (ABS)  --   --  0.02   LYMPHS ABS  --   --  1.43   MONOS ABS  --   --  0.67   EOS ABS  --   --  0.17   MCV  --   --  90.2   LACTATE  --  1.5 2.6*         Lab 23   SODIUM 141   POTASSIUM 4.4   CHLORIDE 106   CO2 22.0    ANION GAP 13.0   BUN 16   CREATININE 0.89   EGFR 88.3   GLUCOSE 123*   CALCIUM 10.1         Lab 09/02/23  1905   TOTAL PROTEIN 7.2   ALBUMIN 4.5   GLOBULIN 2.7   ALT (SGPT) 21   AST (SGOT) 27   BILIRUBIN 4.2*   ALK PHOS 97                 Lab 09/02/23 2005 09/02/23  1926   ABO TYPING O O   RH TYPING Negative Negative   ANTIBODY SCREEN  --  Negative         Brief Urine Lab Results  (Last result in the past 365 days)        Color   Clarity   Blood   Leuk Est   Nitrite   Protein   CREAT   Urine HCG        05/10/23 0949 Yellow   Clear   Negative   Negative   Negative   Negative                   Microbiology Results Abnormal       None            CT Abdomen Pelvis With & Without Contrast    Result Date: 9/2/2023  CT ABDOMEN PELVIS W WO CONTRAST Date of Exam: 9/2/2023 8:47 PM EDT Indication: GI bleed, lower. Comparison: None available. Technique: Axial CT images were obtained of the abdomen and pelvis before and after the uneventful intravenous administration of intravenous contrast. Sagittal and coronal reconstructions were performed.  Automated exposure control and iterative reconstruction methods were used. Findings: Lung Bases:   The visualized lung bases and lower mediastinal structures are unremarkable. The heart appears enlarged. Liver: Liver is normal in size and CT density. No focal lesions. Biliary/Gallbladder:  The gallbladder is normal without evidence of radiopaque stones. The biliary tree is nondilated. Spleen: Spleen is normal in size and CT density. Pancreas:  Pancreas is normal. There is no evidence of pancreatic mass or peripancreatic fluid. Kidneys:  Kidneys are normal in size. There are no stones or hydronephrosis. Simple cyst seen arising from the left kidney. Adrenals:  Adrenal glands are unremarkable. Retroperitoneal/Lymph Nodes/Vasculature:  No retroperitoneal adenopathy is identified. No evidence of contrast extravasation. Mild atherosclerotic calcifications are present.  Gastrointestinal/Mesentery:  The bowel loops are non-dilated without wall thickening or mass. The appendix appears within normal limits. No evidence of obstruction. No free air. No mesenteric fluid collections identified. Moderate to large stool burden present. Bladder:  The bladder is normal. Genital:   The prostate appears enlarged. No evidence of adenopathy. No significant free fluid. Bony Structures:   Visualized bony structures are consistent with the patient's age.     Impression: Impression: 1. No evidence of active contrast extravasation to suggest GI bleed. Moderate to large stool burden present. No acute intra-abdominal or intrapelvic process. 2. Ancillary findings as described above. Electronically Signed: Hollie Maza MD  9/2/2023 9:36 PM EDT  Workstation ID: [id number]     Results for orders placed during the hospital encounter of 12/30/22    Adult Transthoracic Echo Complete W/ Cont if Necessary Per Protocol    Interpretation Summary    Left ventricular systolic function is normal. Calculated left ventricular EF = 57% Left ventricular ejection fraction appears to be 56 - 60%.    Left ventricular wall thickness is consistent with mild concentric hypertrophy.    Left atrial volume is severely increased.    The right atrial cavity is dilated.    Severe mitral valve regurgitation is present with an anteriorly-directed jet noted.    There is a mitral valve ring present.    Estimated right ventricular systolic pressure from tricuspid regurgitation is normal (<35 mmHg). Calculated right ventricular systolic pressure from tricuspid regurgitation is 33 mmHg.      Current medications:  Scheduled Meds:carvedilol, 6.25 mg, Oral, BID With Meals  cloNIDine, 0.15 mg, Oral, Q12H  enalapril, 5 mg, Oral, Daily  senna-docusate sodium, 2 tablet, Oral, BID  sodium chloride, 10 mL, Intravenous, Q12H      Continuous Infusions:lactated ringers, 75 mL/hr, Last Rate: 75 mL/hr (09/03/23 0048)  niCARdipine, 5-15 mg/hr, Last Rate:  Stopped (09/03/23 0233)      PRN Meds:.  acetaminophen **OR** acetaminophen **OR** acetaminophen    senna-docusate sodium **AND** polyethylene glycol **AND** bisacodyl **AND** bisacodyl    LORazepam    melatonin    ondansetron    sodium chloride    sodium chloride    sodium chloride    Assessment & Plan   Assessment & Plan     Active Hospital Problems    Diagnosis  POA    **Acute GI bleeding [K92.2]  Yes    Premature ventricular contractions [I49.3]  Yes    Cardiomyopathy [I42.9]  Yes    H/O mitral valve repair [Z98.890]  Not Applicable    Anxiety [F41.9]  Yes    Mitral valve insufficiency [I34.0]  Yes    Pulmonary hypertension [I27.20]  Yes    Heart murmur [R01.1]  Yes    Hypertension [I10]  Yes    Paroxysmal atrial fibrillation [I48.0]  Yes    Mitral valve disorder [I05.9]  Yes      Resolved Hospital Problems   No resolved problems to display.        Brief Hospital Course to date:  Michael Mallory is a 77 y.o. male admitted with acute lower GI bleed.    Acute lower GI bleed  Suspected hemorrhoids  Left-sided abdominal pain  -GI consult requested  -Monitor hemoglobin  -Transfuse to keep hemoglobin greater than 7    Hypertensive urgency  -Started on a Cardene drip, now discontinued as he subsequently became hypotensive  -Resume home meds  Continue to monitor    Paroxysmal A-fib  -Continue Coreg, currently rate controlled  -Not on chronic anticoagulation    Mitral valve insufficiency with mitral valve replacement  Pulmonary hypertension  -Resume home meds      Expected Discharge Location and Transportation: Home, private transport  Expected Discharge   Expected Discharge Date: 9/4/2023; Expected Discharge Time:      DVT prophylaxis:  Mechanical DVT prophylaxis orders are present.          CODE STATUS:   Code Status and Medical Interventions:   Ordered at: 09/03/23 0000     Code Status (Patient has no pulse and is not breathing):    CPR (Attempt to Resuscitate)     Medical Interventions (Patient has pulse or is  breathing):    Full Support       Melissa Esacmilla MD  09/03/23

## 2023-09-03 NOTE — PLAN OF CARE
Problem: Adult Inpatient Plan of Care  Goal: Plan of Care Review  Outcome: Ongoing, Progressing  Goal: Patient-Specific Goal (Individualized)  Outcome: Ongoing, Progressing  Goal: Absence of Hospital-Acquired Illness or Injury  Outcome: Ongoing, Progressing  Intervention: Identify and Manage Fall Risk  Description: Perform standard risk assessment on admission using a validated tool or comprehensive approach appropriate to the patient; reassess fall risk frequently, with change in status or transfer to another level of care.  Communicate fall injury risk to interprofessional healthcare team.  Determine need for increased observation, equipment and environmental modification, such as low bed, signage and supportive, nonskid footwear.  Adjust safety measures to individual developmental age, stage and identified risk factors.  Reinforce the importance of safety and physical activity with patient and family.  Perform regular intentional rounding to assess need for position change, pain assessment and personal needs, including assistance with toileting.  Recent Flowsheet Documentation  Taken 9/3/2023 0247 by Anahy Barbosa, RN  Safety Promotion/Fall Prevention:   activity supervised   assistive device/personal items within reach   clutter free environment maintained   fall prevention program maintained   lighting adjusted   mobility aid in reach   nonskid shoes/slippers when out of bed   room organization consistent   safety round/check completed  Taken 9/3/2023 0124 by Anahy Barbosa, RN  Safety Promotion/Fall Prevention:   activity supervised   assistive device/personal items within reach   clutter free environment maintained   fall prevention program maintained   lighting adjusted   mobility aid in reach   nonskid shoes/slippers when out of bed   room organization consistent   safety round/check completed  Intervention: Prevent Skin Injury  Description: Perform a screening for skin injury risk, such as pressure or  moisture associated skin damage on admission and at regular intervals throughout hospital stay.  Keep all areas of skin (especially folds) clean and dry.  Maintain adequate skin hydration.  Relieve and redistribute pressure and protect bony prominences; implement measures based on patient-specific risk factors.  Match turning and repositioning schedule to clinical condition.  Encourage weight shift frequently; assist with reposition if unable to complete independently.  Float heels off bed; avoid pressure on the Achilles tendon.  Keep skin free from extended contact with medical devices.  Encourage functional activity and mobility, as early as tolerated.  Use aids (e.g., slide boards, mechanical lift) during transfer.  Recent Flowsheet Documentation  Taken 9/3/2023 0247 by Anahy Barbosa RN  Body Position: position changed independently  Taken 9/3/2023 0124 by Anahy Barbosa RN  Body Position: position changed independently  Skin Protection:   adhesive use limited   tubing/devices free from skin contact   transparent dressing maintained   skin-to-skin areas padded   skin-to-device areas padded   incontinence pads utilized  Intervention: Prevent and Manage VTE (Venous Thromboembolism) Risk  Description: Assess for VTE (venous thromboembolism) risk.  Encourage and assist with early ambulation.  Initiate and maintain compression or other therapy, as indicated, based on identified risk in accordance with organizational protocol and provider order.  Encourage both active and passive leg exercises while in bed, if unable to ambulate.  Recent Flowsheet Documentation  Taken 9/3/2023 0247 by Anahy Barbosa RN  Activity Management: activity encouraged  Taken 9/3/2023 0124 by Anahy Barbosa RN  Activity Management: activity encouraged  Intervention: Prevent Infection  Description: Maintain skin and mucous membrane integrity; promote hand, oral and pulmonary hygiene.  Optimize fluid balance, nutrition, sleep and glycemic  control to maximize infection resistance.  Identify potential sources of infection early to prevent or mitigate progression of infection (e.g., wound, lines, devices).  Evaluate ongoing need for invasive devices; remove promptly when no longer indicated.  Recent Flowsheet Documentation  Taken 9/3/2023 0124 by Anahy Barbosa RN  Infection Prevention:   environmental surveillance performed   equipment surfaces disinfected   hand hygiene promoted   personal protective equipment utilized   rest/sleep promoted  Goal: Optimal Comfort and Wellbeing  Outcome: Ongoing, Progressing  Intervention: Provide Person-Centered Care  Description: Use a family-focused approach to care.  Develop trust and rapport by proactively providing information, encouraging questions, addressing concerns and offering reassurance.  Acknowledge emotional response to hospitalization.  Recognize and utilize personal coping strategies.  Honor spiritual and cultural preferences.  Recent Flowsheet Documentation  Taken 9/3/2023 0124 by Anahy Barbosa RN  Trust Relationship/Rapport:   care explained   choices provided   emotional support provided   empathic listening provided   questions answered   questions encouraged   thoughts/feelings acknowledged  Goal: Readiness for Transition of Care  Outcome: Ongoing, Progressing  Intervention: Mutually Develop Transition Plan  Description: Identify available resources for support (e.g., family, friends, community).  Identify and address barriers to ongoing treatment and home management (e.g., environmental, financial).  Provide opportunities to practice self-management skills.  Assess and monitor emotional readiness for transition.  Establish or reconnect linkage with outpatient providers or community-based services.  Recent Flowsheet Documentation  Taken 9/3/2023 0025 by Anahy Barbosa RN  Transportation Anticipated: family or friend will provide  Patient/Family Anticipated Services at Transition:  none  Patient/Family Anticipates Transition to: home  Taken 9/3/2023 0024 by Anahy Barbosa, RN  Equipment Currently Used at Home: none   Goal Outcome Evaluation:

## 2023-09-04 VITALS
HEIGHT: 68 IN | WEIGHT: 139.9 LBS | OXYGEN SATURATION: 97 % | RESPIRATION RATE: 16 BRPM | BODY MASS INDEX: 21.2 KG/M2 | DIASTOLIC BLOOD PRESSURE: 130 MMHG | TEMPERATURE: 95.2 F | SYSTOLIC BLOOD PRESSURE: 197 MMHG | HEART RATE: 75 BPM

## 2023-09-04 LAB
ANION GAP SERPL CALCULATED.3IONS-SCNC: 6 MMOL/L (ref 5–15)
BASOPHILS # BLD AUTO: 0.05 10*3/MM3 (ref 0–0.2)
BASOPHILS NFR BLD AUTO: 0.8 % (ref 0–1.5)
BUN SERPL-MCNC: 14 MG/DL (ref 8–23)
BUN/CREAT SERPL: 17.7 (ref 7–25)
CALCIUM SPEC-SCNC: 9.3 MG/DL (ref 8.6–10.5)
CHLORIDE SERPL-SCNC: 105 MMOL/L (ref 98–107)
CO2 SERPL-SCNC: 25 MMOL/L (ref 22–29)
CREAT SERPL-MCNC: 0.79 MG/DL (ref 0.76–1.27)
DEPRECATED RDW RBC AUTO: 41.1 FL (ref 37–54)
EGFRCR SERPLBLD CKD-EPI 2021: 91.5 ML/MIN/1.73
EOSINOPHIL # BLD AUTO: 0.14 10*3/MM3 (ref 0–0.4)
EOSINOPHIL NFR BLD AUTO: 2.1 % (ref 0.3–6.2)
ERYTHROCYTE [DISTWIDTH] IN BLOOD BY AUTOMATED COUNT: 12.6 % (ref 12.3–15.4)
GLUCOSE SERPL-MCNC: 87 MG/DL (ref 65–99)
HCT VFR BLD AUTO: 40.6 % (ref 37.5–51)
HGB BLD-MCNC: 14.1 G/DL (ref 13–17.7)
IMM GRANULOCYTES # BLD AUTO: 0.02 10*3/MM3 (ref 0–0.05)
IMM GRANULOCYTES NFR BLD AUTO: 0.3 % (ref 0–0.5)
LYMPHOCYTES # BLD AUTO: 1.46 10*3/MM3 (ref 0.7–3.1)
LYMPHOCYTES NFR BLD AUTO: 22.2 % (ref 19.6–45.3)
MCH RBC QN AUTO: 30.9 PG (ref 26.6–33)
MCHC RBC AUTO-ENTMCNC: 34.7 G/DL (ref 31.5–35.7)
MCV RBC AUTO: 88.8 FL (ref 79–97)
MONOCYTES # BLD AUTO: 0.65 10*3/MM3 (ref 0.1–0.9)
MONOCYTES NFR BLD AUTO: 9.9 % (ref 5–12)
NEUTROPHILS NFR BLD AUTO: 4.27 10*3/MM3 (ref 1.7–7)
NEUTROPHILS NFR BLD AUTO: 64.7 % (ref 42.7–76)
NRBC BLD AUTO-RTO: 0 /100 WBC (ref 0–0.2)
PLATELET # BLD AUTO: 120 10*3/MM3 (ref 140–450)
PMV BLD AUTO: 11 FL (ref 6–12)
POTASSIUM SERPL-SCNC: 3.7 MMOL/L (ref 3.5–5.2)
RBC # BLD AUTO: 4.57 10*6/MM3 (ref 4.14–5.8)
SODIUM SERPL-SCNC: 136 MMOL/L (ref 136–145)
WBC NRBC COR # BLD: 6.59 10*3/MM3 (ref 3.4–10.8)

## 2023-09-04 PROCEDURE — 80048 BASIC METABOLIC PNL TOTAL CA: CPT | Performed by: INTERNAL MEDICINE

## 2023-09-04 PROCEDURE — 85025 COMPLETE CBC W/AUTO DIFF WBC: CPT | Performed by: INTERNAL MEDICINE

## 2023-09-04 PROCEDURE — G0378 HOSPITAL OBSERVATION PER HR: HCPCS

## 2023-09-04 RX ORDER — POLYETHYLENE GLYCOL 3350 17 G/17G
17 POWDER, FOR SOLUTION ORAL DAILY
Qty: 30 PACKET | Refills: 1 | Status: SHIPPED | OUTPATIENT
Start: 2023-09-05 | End: 2023-11-04

## 2023-09-04 RX ORDER — HYDROCORTISONE ACETATE 25 MG/1
25 SUPPOSITORY RECTAL 2 TIMES DAILY
Qty: 9 SUPPOSITORY | Refills: 0 | Status: SHIPPED | OUTPATIENT
Start: 2023-09-04 | End: 2023-09-09

## 2023-09-04 RX ORDER — HYDROCORTISONE ACETATE 25 MG/1
25 SUPPOSITORY RECTAL 2 TIMES DAILY
Status: DISCONTINUED | OUTPATIENT
Start: 2023-09-04 | End: 2023-09-04 | Stop reason: HOSPADM

## 2023-09-04 RX ORDER — ENALAPRIL MALEATE 10 MG/1
10 TABLET ORAL DAILY
Status: DISCONTINUED | OUTPATIENT
Start: 2023-09-04 | End: 2023-09-04 | Stop reason: HOSPADM

## 2023-09-04 RX ORDER — POLYETHYLENE GLYCOL 3350 17 G/17G
17 POWDER, FOR SOLUTION ORAL DAILY
Status: DISCONTINUED | OUTPATIENT
Start: 2023-09-04 | End: 2023-09-04 | Stop reason: HOSPADM

## 2023-09-04 RX ADMIN — ENALAPRIL MALEATE 10 MG: 5 TABLET ORAL at 10:05

## 2023-09-04 RX ADMIN — CARVEDILOL 6.25 MG: 6.25 TABLET, FILM COATED ORAL at 10:07

## 2023-09-04 RX ADMIN — CLONIDINE HYDROCHLORIDE 0.15 MG: 0.1 TABLET ORAL at 10:06

## 2023-09-04 RX ADMIN — HYDROCORTISONE ACETATE 25 MG: 25 SUPPOSITORY RECTAL at 10:07

## 2023-09-04 NOTE — DISCHARGE SUMMARY
[facility] Medicine Services  DISCHARGE SUMMARY    Patient Name: Michael Mallory  : 1945  MRN: [id number]    Date of Admission: 2023  7:21 PM  Date of Discharge:  23    Primary Care Physician: Jordi Ruiz MD    Consults       Date and Time Order Name Status Description    2023 11:02 PM Inpatient Gastroenterology Consult Completed             Hospital Course     Presenting Problem: gi bleed    Active Hospital Problems    Diagnosis  POA    **Acute GI bleeding [K92.2]  Yes    Premature ventricular contractions [I49.3]  Yes    Cardiomyopathy [I42.9]  Yes    H/O mitral valve repair [Z98.890]  Not Applicable    Anxiety [F41.9]  Yes    Mitral valve insufficiency [I34.0]  Yes    Pulmonary hypertension [I27.20]  Yes    Heart murmur [R01.1]  Yes    Hypertension [I10]  Yes    Paroxysmal atrial fibrillation [I48.0]  Yes    Mitral valve disorder [I05.9]  Yes      Resolved Hospital Problems   No resolved problems to display.          Hospital Course:  Mcihael Mallory is a 77 y.o. male admitted with acute lower GI bleed.     Acute lower GI bleed  Suspected hemorrhoids  Left-sided abdominal pain  -GI consulted, recommended Anusol and MiraLAX  - Hemoglobin stable  -Follow-up outpatient and plan outpatient colonoscopy in the next 2 to 4 weeks     Hypertensive urgency  - Initially started on a Cardene drip, discontinued as he subsequently became hypotensive  -Resume home meds     Paroxysmal A-fib  -Continue Coreg, currently rate controlled  -Not on chronic anticoagulation     Mitral valve insufficiency with mitral valve replacement  Pulmonary hypertension  -Resume home meds         Discharge Follow Up Recommendations for outpatient labs/diagnostics:  Follow-up with PCP within 1 week to check hemoglobin and schedule colonoscopy Dr. Suazo within 1 month.  Return to the hospital for any significant further GI bleeding    Day of Discharge     HPI:   Patient feeling well and had a  bowel movement with blood in it yesterday but none since then.  Denies pain.  Not dizzy.  No shortness of breath or abdominal discomfort.  Reviewed recommendations from GI including Anusol suppositories to decrease inflammation and MiraLAX daily.    Review of Systems  Gen- No fevers, chills  CV- No chest pain, palpitations  Resp- No cough, dyspnea  GI- No N/V/D, abd pain      Vital Signs:   Temp:  [95.2 °F (35.1 °C)-97.2 °F (36.2 °C)] 95.2 °F (35.1 °C)  Heart Rate:  [49-81] 75  Resp:  [16-18] 16  BP: (129-197)/() 197/130      Physical Exam:  Constitutional: No acute distress, awake, alert  HENT: NCAT, mucous membranes moist  Respiratory: Clear to auscultation bilaterally, respiratory effort normal   Cardiovascular: RRR  Gastrointestinal: Positive bowel sounds, soft, nontender, nondistended  Musculoskeletal: No bilateral ankle edema  Psychiatric: Appropriate affect, cooperative  Neurologic: Oriented x 3, strength symmetric in all extremities, Cranial Nerves grossly intact to confrontation, speech clear  Skin: No rashes      Pertinent  and/or Most Recent Results     LAB RESULTS:      Lab 09/04/23 0420 09/03/23  0840 09/03/23  0033 09/02/23 2219 09/02/23  1908   WBC 6.59 5.45  --   --  7.89   HEMOGLOBIN 14.1 13.9 14.6  --  16.2   HEMATOCRIT 40.6 41.2 43.5  --  47.7   PLATELETS 120* 128*  --   --  163   NEUTROS ABS 4.27 3.71  --   --  5.57   IMMATURE GRANS (ABS) 0.02 0.01  --   --  0.02   LYMPHS ABS 1.46 1.02  --   --  1.43   MONOS ABS 0.65 0.57  --   --  0.67   EOS ABS 0.14 0.12  --   --  0.17   MCV 88.8 91.2  --   --  90.2   LACTATE  --   --   --  1.5 2.6*         Lab 09/04/23  0420 09/03/23  0840 09/02/23  1905   SODIUM 136 139 141   POTASSIUM 3.7 4.1 4.4   CHLORIDE 105 107 106   CO2 25.0 26.0 22.0   ANION GAP 6.0 6.0 13.0   BUN 14 12 16   CREATININE 0.79 0.83 0.89   EGFR 91.5 90.1 88.3   GLUCOSE 87 98 123*   CALCIUM 9.3 9.3 10.1         Lab 09/02/23  1905   TOTAL PROTEIN 7.2   ALBUMIN 4.5   GLOBULIN 2.7    ALT (SGPT) 21   AST (SGOT) 27   BILIRUBIN 4.2*   ALK PHOS 97                 Lab 09/02/23 2005 09/02/23  1926   ABO TYPING O O   RH TYPING Negative Negative   ANTIBODY SCREEN  --  Negative         Brief Urine Lab Results  (Last result in the past 365 days)        Color   Clarity   Blood   Leuk Est   Nitrite   Protein   CREAT   Urine HCG        05/10/23 0949 Yellow   Clear   Negative   Negative   Negative   Negative                 Microbiology Results (last 10 days)       ** No results found for the last 240 hours. **            CT Abdomen Pelvis With & Without Contrast    Result Date: 9/2/2023  CT ABDOMEN PELVIS W WO CONTRAST Date of Exam: 9/2/2023 8:47 PM EDT Indication: GI bleed, lower. Comparison: None available. Technique: Axial CT images were obtained of the abdomen and pelvis before and after the uneventful intravenous administration of intravenous contrast. Sagittal and coronal reconstructions were performed.  Automated exposure control and iterative reconstruction methods were used. Findings: Lung Bases:   The visualized lung bases and lower mediastinal structures are unremarkable. The heart appears enlarged. Liver: Liver is normal in size and CT density. No focal lesions. Biliary/Gallbladder:  The gallbladder is normal without evidence of radiopaque stones. The biliary tree is nondilated. Spleen: Spleen is normal in size and CT density. Pancreas:  Pancreas is normal. There is no evidence of pancreatic mass or peripancreatic fluid. Kidneys:  Kidneys are normal in size. There are no stones or hydronephrosis. Simple cyst seen arising from the left kidney. Adrenals:  Adrenal glands are unremarkable. Retroperitoneal/Lymph Nodes/Vasculature:  No retroperitoneal adenopathy is identified. No evidence of contrast extravasation. Mild atherosclerotic calcifications are present. Gastrointestinal/Mesentery:  The bowel loops are non-dilated without wall thickening or mass. The appendix appears within normal limits.  No evidence of obstruction. No free air. No mesenteric fluid collections identified. Moderate to large stool burden present. Bladder:  The bladder is normal. Genital:   The prostate appears enlarged. No evidence of adenopathy. No significant free fluid. Bony Structures:   Visualized bony structures are consistent with the patient's age.     Impression: 1. No evidence of active contrast extravasation to suggest GI bleed. Moderate to large stool burden present. No acute intra-abdominal or intrapelvic process. 2. Ancillary findings as described above. Electronically Signed: Hollie Maza MD  9/2/2023 9:36 PM EDT  Workstation ID: [id number]             Results for orders placed during the hospital encounter of 12/30/22    Adult Transthoracic Echo Complete W/ Cont if Necessary Per Protocol    Interpretation Summary    Left ventricular systolic function is normal. Calculated left ventricular EF = 57% Left ventricular ejection fraction appears to be 56 - 60%.    Left ventricular wall thickness is consistent with mild concentric hypertrophy.    Left atrial volume is severely increased.    The right atrial cavity is dilated.    Severe mitral valve regurgitation is present with an anteriorly-directed jet noted.    There is a mitral valve ring present.    Estimated right ventricular systolic pressure from tricuspid regurgitation is normal (<35 mmHg). Calculated right ventricular systolic pressure from tricuspid regurgitation is 33 mmHg.      Plan for Follow-up of Pending Labs/Results: Stool was not collected  Pending Labs       Order Current Status    Occult Blood X 1, Stool - Stool, Per Rectum Collected (09/02/23 1910)          Discharge Details        Discharge Medications        New Medications        Instructions Start Date   hydrocortisone 25 MG suppository  Commonly known as: ANUSOL-HC   25 mg, Rectal, 2 Times Daily      polyethylene glycol 17 g packet  Commonly known as: MIRALAX   17 g, Oral, Daily   Start Date: September  5, 2023            Continue These Medications        Instructions Start Date   aspirin 81 MG EC tablet   81 mg, Oral, Daily      atorvastatin 40 MG tablet  Commonly known as: LIPITOR   40 mg, Oral, Daily      carvedilol 12.5 MG tablet  Commonly known as: COREG   TAKE ONE-HALF (1/2) TABLET (6.25 MG) IN THE MORNING AND 1 TABLET (12.5 MG) IN THE EVENING      cloNIDine 0.1 MG tablet  Commonly known as: CATAPRES   TAKE ONE AND ONE-HALF TABLETS (0.15 MG) EVERY MORNING AND TAKE ONE TABLET AT BEDTIME      enalapril 5 MG tablet  Commonly known as: VASOTEC   TAKE 1 TABLET DAILY               No Known Allergies      Discharge Disposition:  Home or Self Care    Diet:  Hospital:  Diet Order   Procedures    Diet: Regular/House Diet; Texture: Regular Texture (IDDSI 7); Fluid Consistency: Thin (IDDSI 0)       Activity:  Activity Instructions       Activity as Tolerated              Restrictions or Other Recommendations:  No restrictions       CODE STATUS:    Code Status and Medical Interventions:   Ordered at: 09/03/23 0000     Code Status (Patient has no pulse and is not breathing):    CPR (Attempt to Resuscitate)     Medical Interventions (Patient has pulse or is breathing):    Full Support       Future Appointments   Date Time Provider Department Center   1/23/2024  2:45 PM Howard Rivera III, MD [facility]   5/13/2024  8:00 AM Jordi Ruiz MD [facility]       Additional Instructions for the Follow-ups that You Need to Schedule       Discharge Follow-up with PCP   As directed       Currently Documented PCP:    Jordi Ruiz MD    PCP Phone Number:    [phone]     Follow Up Details: 1 week to check cbc and discuss lower gi bleed        Discharge Follow-up with Specified Provider: Dr. Suazo to schedule colonoscopy; 1 Month   As directed      To: Dr. Suazo to schedule colonoscopy   Follow Up: 1 Month                      Melissa Escamilla MD  09/04/23      Time Spent on Discharge:  I spent  28   minutes on this discharge activity which included: face-to-face encounter with the patient, reviewing the data in the system, coordination of the care with the nursing staff as well as consultants, documentation, and entering orders.

## 2023-09-04 NOTE — PLAN OF CARE
Problem: Adult Inpatient Plan of Care  Goal: Plan of Care Review  Outcome: Met  Goal: Patient-Specific Goal (Individualized)  Outcome: Met  Goal: Absence of Hospital-Acquired Illness or Injury  Outcome: Met  Intervention: Identify and Manage Fall Risk  Recent Flowsheet Documentation  Taken 9/4/2023 0800 by Tommie Vila RN  Safety Promotion/Fall Prevention:   activity supervised   assistive device/personal items within reach   clutter free environment maintained   fall prevention program maintained   toileting scheduled   safety round/check completed   room organization consistent  Intervention: Prevent Skin Injury  Recent Flowsheet Documentation  Taken 9/4/2023 0800 by Tommie Vila RN  Body Position: position changed independently  Skin Protection:   adhesive use limited   tubing/devices free from skin contact   skin-to-skin areas padded   skin-to-device areas padded  Intervention: Prevent and Manage VTE (Venous Thromboembolism) Risk  Recent Flowsheet Documentation  Taken 9/4/2023 0800 by Tommie Vila RN  Activity Management: activity encouraged  Intervention: Prevent Infection  Recent Flowsheet Documentation  Taken 9/4/2023 0800 by Tommie Vila RN  Infection Prevention: environmental surveillance performed  Goal: Optimal Comfort and Wellbeing  Outcome: Met  Intervention: Provide Person-Centered Care  Recent Flowsheet Documentation  Taken 9/4/2023 0800 by Tommie Vila RN  Trust Relationship/Rapport:   care explained   emotional support provided   empathic listening provided   questions answered   reassurance provided   questions encouraged   thoughts/feelings acknowledged  Goal: Readiness for Transition of Care  Outcome: Met     Problem: Asthma Comorbidity  Goal: Maintenance of Asthma Control  Outcome: Met  Intervention: Maintain Asthma Symptom Control  Recent Flowsheet Documentation  Taken 9/4/2023 0800 by Tommie Vila RN  Medication Review/Management: medications reviewed      Problem: Behavioral Health Comorbidity  Goal: Maintenance of Behavioral Health Symptom Control  Outcome: Met  Intervention: Maintain Behavioral Health Symptom Control  Recent Flowsheet Documentation  Taken 9/4/2023 0800 by Tommie Vila RN  Medication Review/Management: medications reviewed     Problem: COPD (Chronic Obstructive Pulmonary Disease) Comorbidity  Goal: Maintenance of COPD Symptom Control  Outcome: Met  Intervention: Maintain COPD-Symptom Control  Recent Flowsheet Documentation  Taken 9/4/2023 0800 by Tommie Vila RN  Medication Review/Management: medications reviewed     Problem: Diabetes Comorbidity  Goal: Blood Glucose Level Within Targeted Range  Outcome: Met     Problem: Heart Failure Comorbidity  Goal: Maintenance of Heart Failure Symptom Control  Outcome: Met  Intervention: Maintain Heart Failure-Management  Recent Flowsheet Documentation  Taken 9/4/2023 0800 by Tommie Vila RN  Medication Review/Management: medications reviewed     Problem: Hypertension Comorbidity  Goal: Blood Pressure in Desired Range  Outcome: Met  Intervention: Maintain Blood Pressure Management  Recent Flowsheet Documentation  Taken 9/4/2023 0800 by Tommie Vila RN  Medication Review/Management: medications reviewed     Problem: Obstructive Sleep Apnea Risk or Actual Comorbidity Management  Goal: Unobstructed Breathing During Sleep  Outcome: Met     Problem: Osteoarthritis Comorbidity  Goal: Maintenance of Osteoarthritis Symptom Control  Outcome: Met  Intervention: Maintain Osteoarthritis Symptom Control  Recent Flowsheet Documentation  Taken 9/4/2023 0800 by Tommie Vila RN  Activity Management: activity encouraged  Medication Review/Management: medications reviewed     Problem: Pain Chronic (Persistent) (Comorbidity Management)  Goal: Acceptable Pain Control and Functional Ability  Outcome: Met  Intervention: Manage Persistent Pain  Recent Flowsheet Documentation  Taken 9/4/2023 0800 by Julito  BENNIE Reilly  Medication Review/Management: medications reviewed     Problem: Seizure Disorder Comorbidity  Goal: Maintenance of Seizure Control  Outcome: Met   Goal Outcome Evaluation:

## 2023-09-04 NOTE — PLAN OF CARE
Problem: Adult Inpatient Plan of Care  Goal: Plan of Care Review  Outcome: Ongoing, Progressing  Goal: Patient-Specific Goal (Individualized)  Outcome: Ongoing, Progressing  Goal: Absence of Hospital-Acquired Illness or Injury  Outcome: Ongoing, Progressing  Intervention: Identify and Manage Fall Risk  Description: Perform standard risk assessment on admission using a validated tool or comprehensive approach appropriate to the patient; reassess fall risk frequently, with change in status or transfer to another level of care.  Communicate fall injury risk to interprofessional healthcare team.  Determine need for increased observation, equipment and environmental modification, such as low bed, signage and supportive, nonskid footwear.  Adjust safety measures to individual developmental age, stage and identified risk factors.  Reinforce the importance of safety and physical activity with patient and family.  Perform regular intentional rounding to assess need for position change, pain assessment and personal needs, including assistance with toileting.  Recent Flowsheet Documentation  Taken 9/4/2023 0359 by Anahy Barbosa, RN  Safety Promotion/Fall Prevention: activity supervised  Taken 9/4/2023 0200 by Anahy Barbosa, RN  Safety Promotion/Fall Prevention:   activity supervised   assistive device/personal items within reach   clutter free environment maintained   fall prevention program maintained   lighting adjusted   mobility aid in reach   nonskid shoes/slippers when out of bed   room organization consistent   safety round/check completed  Taken 9/4/2023 0000 by Anahy Barbosa, RN  Safety Promotion/Fall Prevention:   activity supervised   assistive device/personal items within reach   clutter free environment maintained   fall prevention program maintained   mobility aid in reach   lighting adjusted   nonskid shoes/slippers when out of bed   room organization consistent   safety round/check completed  Taken  9/3/2023 2200 by Anahy Barbosa RN  Safety Promotion/Fall Prevention:   activity supervised   assistive device/personal items within reach   clutter free environment maintained   fall prevention program maintained   lighting adjusted   mobility aid in reach   nonskid shoes/slippers when out of bed   room organization consistent   safety round/check completed  Taken 9/3/2023 2000 by Anahy Barbosa RN  Safety Promotion/Fall Prevention:   activity supervised   assistive device/personal items within reach   clutter free environment maintained   fall prevention program maintained   lighting adjusted   mobility aid in reach   nonskid shoes/slippers when out of bed   room organization consistent   safety round/check completed  Intervention: Prevent Skin Injury  Description: Perform a screening for skin injury risk, such as pressure or moisture associated skin damage on admission and at regular intervals throughout hospital stay.  Keep all areas of skin (especially folds) clean and dry.  Maintain adequate skin hydration.  Relieve and redistribute pressure and protect bony prominences; implement measures based on patient-specific risk factors.  Match turning and repositioning schedule to clinical condition.  Encourage weight shift frequently; assist with reposition if unable to complete independently.  Float heels off bed; avoid pressure on the Achilles tendon.  Keep skin free from extended contact with medical devices.  Encourage functional activity and mobility, as early as tolerated.  Use aids (e.g., slide boards, mechanical lift) during transfer.  Recent Flowsheet Documentation  Taken 9/4/2023 0400 by Anahy Barbosa RN  Body Position: position changed independently  Taken 9/4/2023 0359 by Anahy Barbosa RN  Body Position: position changed independently  Taken 9/4/2023 0200 by Anahy Barbosa RN  Body Position: position changed independently  Taken 9/4/2023 0000 by Anahy Barbosa RN  Body Position: position changed  independently  Taken 9/3/2023 2200 by Anahy Barbosa RN  Body Position: position changed independently  Taken 9/3/2023 2000 by Anahy Barbosa RN  Body Position: position changed independently  Skin Protection:   adhesive use limited   tubing/devices free from skin contact   transparent dressing maintained   skin-to-skin areas padded   skin-to-device areas padded   incontinence pads utilized  Intervention: Prevent and Manage VTE (Venous Thromboembolism) Risk  Description: Assess for VTE (venous thromboembolism) risk.  Encourage and assist with early ambulation.  Initiate and maintain compression or other therapy, as indicated, based on identified risk in accordance with organizational protocol and provider order.  Encourage both active and passive leg exercises while in bed, if unable to ambulate.  Recent Flowsheet Documentation  Taken 9/4/2023 0359 by Anahy Barbosa RN  Activity Management: activity encouraged  Taken 9/4/2023 0200 by Anahy Barbosa RN  Activity Management: activity encouraged  Taken 9/4/2023 0000 by Anahy Barbosa RN  Activity Management: activity encouraged  Taken 9/3/2023 2200 by Anahy Barbosa RN  Activity Management: activity encouraged  Taken 9/3/2023 2000 by Anahy Barbosa RN  Activity Management: activity encouraged  Intervention: Prevent Infection  Description: Maintain skin and mucous membrane integrity; promote hand, oral and pulmonary hygiene.  Optimize fluid balance, nutrition, sleep and glycemic control to maximize infection resistance.  Identify potential sources of infection early to prevent or mitigate progression of infection (e.g., wound, lines, devices).  Evaluate ongoing need for invasive devices; remove promptly when no longer indicated.  Recent Flowsheet Documentation  Taken 9/3/2023 2000 by Anahy Barbosa RN  Infection Prevention:   environmental surveillance performed   equipment surfaces disinfected   hand hygiene promoted   personal protective equipment utilized    rest/sleep promoted  Goal: Optimal Comfort and Wellbeing  Outcome: Ongoing, Progressing  Intervention: Provide Person-Centered Care  Description: Use a family-focused approach to care.  Develop trust and rapport by proactively providing information, encouraging questions, addressing concerns and offering reassurance.  Acknowledge emotional response to hospitalization.  Recognize and utilize personal coping strategies.  Honor spiritual and cultural preferences.  Recent Flowsheet Documentation  Taken 9/3/2023 2000 by Anahy Barbosa RN  Trust Relationship/Rapport:   care explained   choices provided   emotional support provided   empathic listening provided   questions answered   questions encouraged   thoughts/feelings acknowledged  Goal: Readiness for Transition of Care  Outcome: Ongoing, Progressing   Goal Outcome Evaluation:

## 2023-09-04 NOTE — ED PROVIDER NOTES
Subjective   History of Present Illness  77-year-old male who presents for evaluation of rectal bleeding.  The patient reports that at 4:30 PM this morning he went to urinate and ultimately passed blood per rectum.  He reports that he is also passed some clots in association with the bright red bleeding.  He does not take any anticoagulation.  He denies any abdominal pain.  He denies chest pain, cough, shortness of breath.  He denies recent diarrhea or difficulty passing stool.  No acute urinary symptoms, including dysuria, frequency, or urgency.  No other acute complaints.    Review of Systems   Constitutional:  Positive for fatigue. Negative for chills and fever.   HENT:  Negative for congestion, ear pain, postnasal drip, sinus pressure and sore throat.    Eyes:  Negative for pain, redness and visual disturbance.   Respiratory:  Negative for cough, chest tightness and shortness of breath.    Cardiovascular:  Negative for chest pain, palpitations and leg swelling.   Gastrointestinal:  Positive for blood in stool. Negative for abdominal pain, anal bleeding, diarrhea, nausea and vomiting.   Endocrine: Negative for polydipsia and polyuria.   Genitourinary:  Negative for difficulty urinating, dysuria, frequency and urgency.   Musculoskeletal:  Negative for arthralgias, back pain and neck pain.   Skin:  Negative for pallor and rash.   Allergic/Immunologic: Negative for environmental allergies and immunocompromised state.   Neurological:  Negative for dizziness, weakness and headaches.   Hematological:  Negative for adenopathy.   Psychiatric/Behavioral:  Negative for confusion, self-injury and suicidal ideas. The patient is nervous/anxious.    All other systems reviewed and are negative.    Past Medical History:   Diagnosis Date    Anxiety     Arrhythmia     Atrial fibrillation 08/22/2016    CHF (congestive heart failure) 2018    Heart murmur 1997    Hypertension 08/22/2016    Mini stroke 12/2022    TIA    Mitral valve  disorder     Palpitations 08/22/2016    Stroke 2015    TIA    TIA (transient ischemic attack)        No Known Allergies    Past Surgical History:   Procedure Laterality Date    ABLATION OF DYSRHYTHMIC FOCUS  2018    Perhaps    AORTIC VALVE SURGERY  2018    ATRIAL APPENDAGE EXCLUSION LEFT WITH TRANSESOPHAGEAL ECHOCARDIOGRAM      CARDIAC CATHETERIZATION  2018    Perhaps    CARDIAC VALVE REPLACEMENT  2018    Mitral valve repair    COLONOSCOPY  2013/14    HEMORRHOIDECTOMY      HERNIA REPAIR      MITRAL VALVE REPAIR/REPLACEMENT         Family History   Problem Relation Age of Onset    Heart attack Father         46    Transient ischemic attack Mother     Cancer Mother         83       Social History     Socioeconomic History    Marital status:    Tobacco Use    Smoking status: Never    Smokeless tobacco: Never   Vaping Use    Vaping Use: Never used   Substance and Sexual Activity    Alcohol use: Not Currently     Comment: Insignificant amounts    Drug use: No    Sexual activity: Yes     Partners: Female           Objective   Physical Exam  Vitals and nursing note reviewed.   Constitutional:       General: He is not in acute distress.     Appearance: Normal appearance. He is well-developed. He is not toxic-appearing or diaphoretic.   HENT:      Head: Normocephalic and atraumatic.      Right Ear: External ear normal.      Left Ear: External ear normal.      Nose: Nose normal.   Eyes:      General: Lids are normal.      Pupils: Pupils are equal, round, and reactive to light.   Neck:      Trachea: No tracheal deviation.   Cardiovascular:      Rate and Rhythm: Normal rate and regular rhythm.      Pulses: No decreased pulses.      Heart sounds: Normal heart sounds. No murmur heard.    No friction rub. No gallop.   Pulmonary:      Effort: Pulmonary effort is normal. No respiratory distress.      Breath sounds: Normal breath sounds. No decreased breath sounds, wheezing, rhonchi or rales.   Abdominal:      General: Bowel  sounds are normal.      Palpations: Abdomen is soft.      Tenderness: There is no abdominal tenderness. There is no guarding or rebound.   Genitourinary:     Rectum: Guaiac result positive. No anal fissure, external hemorrhoid or internal hemorrhoid. Normal anal tone.       Musculoskeletal:         General: No deformity. Normal range of motion.      Cervical back: Normal range of motion and neck supple.   Lymphadenopathy:      Cervical: No cervical adenopathy.   Skin:     General: Skin is warm and dry.      Findings: No rash.   Neurological:      Mental Status: He is alert and oriented to person, place, and time.      Cranial Nerves: No cranial nerve deficit.      Sensory: No sensory deficit.   Psychiatric:         Speech: Speech normal.         Behavior: Behavior normal.         Thought Content: Thought content normal.         Judgment: Judgment normal.       Procedures           ED Course                                           Medical Decision Making  Differential diagnosis includes diverticulosis, AVM, anemia, dehydration, other unspecified etiology.    Lab evaluation shows stable H&H, normal white count, normal kidney function, no significant electrolyte abnormalities.    Lactic acid level was elevated.  Fecal occult stool test was positive.    Lactic acid level was normal.    CT scan of the abdomen pelvisShows no evidence of active contrast extravasation to suggest GI bleed.  Moderate to large stool burden present.    I discussed the patient's presentation with the on-call cardiologist, Dr. Amanda, who recommends admission for scope.    I discussed the patient with the hospitalist, who will consult on the patient to determine status of admission.    Problems Addressed:  Acute GI bleeding: complicated acute illness or injury    Amount and/or Complexity of Data Reviewed  Independent Historian: friend     Details: Daughter provides additional history.  External Data Reviewed: labs, radiology and  notes.  Labs: ordered. Decision-making details documented in ED Course.  Radiology: ordered and independent interpretation performed. Decision-making details documented in ED Course.    Risk  Prescription drug management.  Decision regarding hospitalization.        Final diagnoses:   Acute GI bleeding       ED Disposition  ED Disposition       ED Disposition   Decision to Admit    Condition   --    Comment   Level of Care: Telemetry [5]   Diagnosis: Acute GI bleeding [044531]   Admitting Physician: PATI LEWIS [[id number]]   Attending Physician: PATI LEWIS [[id number]]                 No follow-up provider specified.       Medication List      No changes were made to your prescriptions during this visit.            Danilo Hughes MD  09/03/23 [id number]

## 2023-09-08 ENCOUNTER — OFFICE VISIT (OUTPATIENT)
Dept: FAMILY MEDICINE CLINIC | Facility: CLINIC | Age: 78
End: 2023-09-08
Payer: MEDICARE

## 2023-09-08 VITALS
HEIGHT: 68 IN | DIASTOLIC BLOOD PRESSURE: 88 MMHG | OXYGEN SATURATION: 99 % | TEMPERATURE: 95.7 F | HEART RATE: 54 BPM | WEIGHT: 141.4 LBS | BODY MASS INDEX: 21.43 KG/M2 | RESPIRATION RATE: 18 BRPM | SYSTOLIC BLOOD PRESSURE: 150 MMHG

## 2023-09-08 DIAGNOSIS — K92.2 ACUTE GASTROINTESTINAL BLEEDING: Primary | ICD-10-CM

## 2023-09-08 LAB
BASOPHILS # BLD AUTO: 0.04 10*3/MM3 (ref 0–0.2)
BASOPHILS NFR BLD AUTO: 0.5 % (ref 0–1.5)
DEPRECATED RDW RBC AUTO: 43.8 FL (ref 37–54)
EOSINOPHIL # BLD AUTO: 0.09 10*3/MM3 (ref 0–0.4)
EOSINOPHIL NFR BLD AUTO: 1.1 % (ref 0.3–6.2)
ERYTHROCYTE [DISTWIDTH] IN BLOOD BY AUTOMATED COUNT: 12.9 % (ref 12.3–15.4)
HCT VFR BLD AUTO: 43.2 % (ref 37.5–51)
HGB BLD-MCNC: 14.3 G/DL (ref 13–17.7)
IMM GRANULOCYTES # BLD AUTO: 0.03 10*3/MM3 (ref 0–0.05)
IMM GRANULOCYTES NFR BLD AUTO: 0.4 % (ref 0–0.5)
LYMPHOCYTES # BLD AUTO: 0.8 10*3/MM3 (ref 0.7–3.1)
LYMPHOCYTES NFR BLD AUTO: 9.9 % (ref 19.6–45.3)
MCH RBC QN AUTO: 30.3 PG (ref 26.6–33)
MCHC RBC AUTO-ENTMCNC: 33.1 G/DL (ref 31.5–35.7)
MCV RBC AUTO: 91.5 FL (ref 79–97)
MONOCYTES # BLD AUTO: 0.73 10*3/MM3 (ref 0.1–0.9)
MONOCYTES NFR BLD AUTO: 9 % (ref 5–12)
NEUTROPHILS NFR BLD AUTO: 6.38 10*3/MM3 (ref 1.7–7)
NEUTROPHILS NFR BLD AUTO: 79.1 % (ref 42.7–76)
NRBC BLD AUTO-RTO: 0 /100 WBC (ref 0–0.2)
PLATELET # BLD AUTO: 154 10*3/MM3 (ref 140–450)
PMV BLD AUTO: 11.6 FL (ref 6–12)
RBC # BLD AUTO: 4.72 10*6/MM3 (ref 4.14–5.8)
WBC NRBC COR # BLD: 8.07 10*3/MM3 (ref 3.4–10.8)

## 2023-09-08 PROCEDURE — 85025 COMPLETE CBC W/AUTO DIFF WBC: CPT | Performed by: FAMILY MEDICINE

## 2023-09-08 NOTE — PROGRESS NOTES
Transitional Care Follow Up Visit  Subjective     Michael Mallory is a 77 y.o. male who presents for a transitional care management visit.    Within 48 business hours after discharge our office contacted him via telephone to coordinate his care and needs.      I reviewed and discussed the details of that call along with the discharge summary, hospital problems, inpatient lab results, inpatient diagnostic studies, and consultation reports with Michael.     Current outpatient and discharge medications have been reconciled for the patient.  Reviewed by: Jordi Ruiz MD           No data to display              Risk for Readmission (LACE) Score: 6 (9/4/2023  6:00 AM)      History of Present Illness  The patient is a 76 yo male who is here for Transitional care follow up, he was admitted at [facility] on 9/2/23 and discharge on 9/4/23. He was admitted due to acute GI bleeding. GI was consulted and internal hemorrhoids suspected. Since hemoglobin was stable he was then discharged on anusol and miralax and advised to follow up with PCP to recheck hemoglobin   Course During Hospital Stay:  As above.     The following portions of the patient's history were reviewed and updated as appropriate: allergies, current medications, past family history, past medical history, past social history, past surgical history, and problem list.    Review of Systems    Objective   Physical Exam  Vitals and nursing note reviewed.   Constitutional:       General: He is not in acute distress.     Appearance: Normal appearance. He is well-developed and well-groomed.   HENT:      Head: Normocephalic and atraumatic.      Jaw: No tenderness or pain on movement.      Salivary Glands: Right salivary gland is not diffusely enlarged. Left salivary gland is not diffusely enlarged.      Right Ear: Tympanic membrane and ear canal normal.      Left Ear: Tympanic membrane and ear canal normal.      Nose: No congestion or rhinorrhea.       Mouth/Throat:      Mouth: Mucous membranes are moist.      Pharynx: No oropharyngeal exudate or posterior oropharyngeal erythema.   Eyes:      General: Lids are normal. No allergic shiner or scleral icterus.     Conjunctiva/sclera: Conjunctivae normal.      Pupils: Pupils are equal, round, and reactive to light.   Neck:      Thyroid: No thyroid mass, thyromegaly or thyroid tenderness.      Trachea: Trachea normal.   Cardiovascular:      Rate and Rhythm: Normal rate and regular rhythm. No extrasystoles are present.     Pulses: Normal pulses.      Heart sounds: Normal heart sounds. No murmur heard.  Pulmonary:      Effort: Pulmonary effort is normal. No respiratory distress.      Breath sounds: Normal breath sounds. No decreased breath sounds, wheezing, rhonchi or rales.   Chest:   Breasts:     Right: Normal.      Left: Normal.   Abdominal:      General: Bowel sounds are normal.      Palpations: Abdomen is soft.      Tenderness: There is no abdominal tenderness. There is no right CVA tenderness, left CVA tenderness, guarding or rebound.   Musculoskeletal:      Cervical back: Neck supple.      Right lower leg: No edema.      Left lower leg: No edema.   Lymphadenopathy:      Cervical: No cervical adenopathy.      Upper Body:      Right upper body: No supraclavicular or axillary adenopathy.      Left upper body: No supraclavicular or axillary adenopathy.   Skin:     General: Skin is warm.      Nails: There is no clubbing.   Neurological:      General: No focal deficit present.      Mental Status: He is alert and oriented to person, place, and time.      Sensory: Sensation is intact.      Motor: Motor function is intact.      Coordination: Coordination is intact.   Psychiatric:         Attention and Perception: Attention and perception normal.         Mood and Affect: Mood normal.         Speech: Speech normal.         Behavior: Behavior normal. Behavior is cooperative.         Thought Content: Thought content normal.        Assessment & Plan   Diagnoses and all orders for this visit:    1. Acute gastrointestinal bleeding (Primary)  Comments:  ? internal hemorrhoids, improved, will check CBC today  Orders:  -     CBC & Differential      The patient is advised to continue all of his regular medications as prescribed. He was counseled regarding the importance of diet, exercise and medication compliance.       The patient was advised rest, increase oral fluids. RTC as needed if symptoms worsen or fail to improve.      This document has been electronically signed by Jordi Ruiz MD  September 10, 2023 22:15 EDT

## 2023-09-25 RX ORDER — ENALAPRIL MALEATE 5 MG/1
TABLET ORAL
Qty: 90 TABLET | Refills: 1 | Status: SHIPPED | OUTPATIENT
Start: 2023-09-25

## 2023-11-01 DIAGNOSIS — I10 ESSENTIAL HYPERTENSION: ICD-10-CM

## 2023-11-01 RX ORDER — CLONIDINE HYDROCHLORIDE 0.1 MG/1
TABLET ORAL
Qty: 225 TABLET | Refills: 1 | Status: SHIPPED | OUTPATIENT
Start: 2023-11-01

## 2023-11-08 ENCOUNTER — OUTSIDE FACILITY SERVICE (OUTPATIENT)
Dept: GASTROENTEROLOGY | Facility: CLINIC | Age: 78
End: 2023-11-08
Payer: MEDICARE

## 2023-11-08 PROCEDURE — 45385 COLONOSCOPY W/LESION REMOVAL: CPT | Performed by: INTERNAL MEDICINE

## 2024-01-18 ENCOUNTER — TELEPHONE (OUTPATIENT)
Dept: CARDIOLOGY | Facility: CLINIC | Age: 79
End: 2024-01-18

## 2024-01-23 ENCOUNTER — OFFICE VISIT (OUTPATIENT)
Dept: CARDIOLOGY | Facility: CLINIC | Age: 79
End: 2024-01-23
Payer: MEDICARE

## 2024-01-23 VITALS
SYSTOLIC BLOOD PRESSURE: 140 MMHG | WEIGHT: 144 LBS | HEART RATE: 70 BPM | DIASTOLIC BLOOD PRESSURE: 72 MMHG | HEIGHT: 68 IN | BODY MASS INDEX: 21.82 KG/M2 | OXYGEN SATURATION: 98 %

## 2024-01-23 DIAGNOSIS — Z98.890 H/O MITRAL VALVE REPAIR: ICD-10-CM

## 2024-01-23 DIAGNOSIS — I10 PRIMARY HYPERTENSION: Chronic | ICD-10-CM

## 2024-01-23 DIAGNOSIS — I48.0 PAROXYSMAL ATRIAL FIBRILLATION: ICD-10-CM

## 2024-01-23 DIAGNOSIS — I49.3 PREMATURE VENTRICULAR CONTRACTIONS: ICD-10-CM

## 2024-01-23 DIAGNOSIS — I34.0 NONRHEUMATIC MITRAL VALVE REGURGITATION: ICD-10-CM

## 2024-01-23 DIAGNOSIS — I42.0 DILATED CARDIOMYOPATHY: Primary | ICD-10-CM

## 2024-01-23 PROCEDURE — 3077F SYST BP >= 140 MM HG: CPT | Performed by: INTERNAL MEDICINE

## 2024-01-23 PROCEDURE — 99214 OFFICE O/P EST MOD 30 MIN: CPT | Performed by: INTERNAL MEDICINE

## 2024-01-23 PROCEDURE — 3078F DIAST BP <80 MM HG: CPT | Performed by: INTERNAL MEDICINE

## 2024-01-23 RX ORDER — TAMSULOSIN HYDROCHLORIDE 0.4 MG/1
1 CAPSULE ORAL EVERY OTHER DAY
COMMUNITY

## 2024-01-23 NOTE — PROGRESS NOTES
[facility]  Office visit  Michael Mallory  1945    There is no work phone number on file.    VISIT DATE:  1/23/2024    PCP: Jordi Ruiz MD  [address]  [place] [address]    CC:  Chief Complaint   Patient presents with    Follow-up     6 month     Cardiac studies and procedures:  October 2017 echo: EF 55 to 60%, grade 2 diastolic dysfunction, mild LVH, mild LV dilation-LVIDd 5.9 cm, LVIDs 3.7 cm, moderate to severe left atrial enlargement, moderate right atrial enlargement, prolapse of P2 segment with severe mitral regurgitation, estimated RVSP 50 to 55 mmHg.    August 2018 [facility]: Mitral valve repair with placement of a 35 mm Xiong annuloplasty ring, exclusion of left atrial appendage.    September 2018 echo [facility]: LVEF 45 ± 5, trivial MR status post mitral valve repair.    October 2018 echo  The left ventricular cavity is severely dilated. Global left ventricular ejection fraction is estimated at 25-30%.  Left ventricular wall thickness is consistent with mild concentric hypertrophy.  Left ventricular diastolic dysfunction (grade II) consistent with pseudonormalization.  Right ventricular cavity is borderline dilated.  Moderately reduced right ventricular systolic function noted.  Left atrial cavity size is moderate-to-severely dilated.  Moderate mitral valve regurgitation is present  Mild to moderate tricuspid valve regurgitation is present.  Mild dilation of the aortic root is present. No dissection or aneurysm is identified.    November 2018 MUGA  #1.  Dilated left ventricle.  #2.  Global left ventricular ejection fraction of 23%.    May 2019 echo  Calculated EF = 47.0%. Estimated EF appears to be in the range of 46 - 50%  The left ventricular cavity is borderline dilated.  Left ventricular diastolic dysfunction (grade II) consistent with pseudonormalization.  Moderate-to-severe mitral valve regurgitation is present  Left atrial cavity size is moderately dilated.  Calculated  right ventricular systolic pressure from tricuspid regurgitation is 35 mmHg.    October 2019 2-week  ECG monitor:An abnormal monitor study. Frequent PVCs, 33.5%, which are rarely symptomatic.    November 2019 echo  Estimated EF appears to be in the range of 46 - 50%.  Left ventricular wall thickness is consistent with borderline concentric hypertrophy.  The left ventricular cavity is borderline dilated.  Left atrial cavity size is severely dilated.  Moderate-to-severe mitral valve regurgitation is present  Xiong Mitral Valve Annuloplasty Ring (size #35) and NEELA clip at [facility] in August 2018.    October 2020 echo  Calculated left ventricular 3D EF = 60% Left ventricular ejection fraction appears to be 56 - 60%. Left ventricular systolic function is normal.  Left ventricular diastolic function is consistent with (grade III w/high LAP) restrictive pattern.  The left ventricular cavity is borderline dilated.  The left atrial cavity is severely dilated.  Moderate to severe mitral valve regurgitation is present with an anteriorly-directed jet noted.  Estimated right ventricular systolic pressure from tricuspid regurgitation is normal (<35 mmHg).  Mild dilation of the ascending aorta is present.    November 2021 TTE  Left ventricular ejection fraction appears to be 56 - 60%. Left ventricular systolic function is normal.  Left ventricular wall thickness is consistent with borderline concentric hypertrophy.  Left atrial volume is severely increased.  Status post mitral valve repair with 35 mm annuloplasty ring.  Severe mitral valve regurgitation is present.    December 2022   CT head neck  -Apparent focal severe stenosis of mid intradural left vertebral artery.  Mild to moderate stenosis of distal intradural right vertebral artery. Mild stenosis of proximal basilar artery.   -Moderate stenosis of right and mild stenosis of left supraclinoid internal carotid arteries. Mild to moderate segmental narrowing of proximal  left A2 segment. Otherwise no definite large vessel high-grade stenosis or occlusion.   -Crescentic mild hyperdensity projection along paraclinoid left internal carotid artery, probably representing a calcific plaque. Apparent adjacent subtle projection in paraclinoid left ICA measuring approximately 2 to 2.5 mm which may represent ulcerated plaque, although a shallow small aneurysm is also possible.   TTE    Left ventricular systolic function is normal. Calculated left ventricular EF = 57% Left ventricular ejection fraction appears to be 56 - 60%.    Left ventricular wall thickness is consistent with mild concentric hypertrophy.    Left atrial volume is severely increased.    The right atrial cavity is dilated.    Severe mitral valve regurgitation is present with an anteriorly-directed jet noted.    There is a mitral valve ring present.    Estimated right ventricular systolic pressure from tricuspid regurgitation is normal (<35 mmHg). Calculated right ventricular systolic pressure from tricuspid regurgitation is 33 mmHg.    ASSESSMENT:   Diagnosis Plan   1. Dilated cardiomyopathy        2. H/O mitral valve repair        3. Primary hypertension        4. Nonrheumatic mitral valve regurgitation        5. Paroxysmal atrial fibrillation        6. Premature ventricular contractions              PLAN:  Congestive heart failure, chronic, systolic: Secondary to valvular heart disease.  Currently euvolemic and compensated with preserved functional capacity, [place] heart class I.    Vincenzo EF 23% by MUGA, 25% by echo.   Now with stable EF on medical therapy, 55 to 60%.  Currently on maximally tolerated medical therapy, continue current doses of carvedilol 6.25 mg every morning and 12.5 mg every afternoon and enalapril 2.5 mg p.o. daily.  Repeat echocardiographic evaluation pending.    Hypertension: Goal less than 130/80 mmHg.  Blood pressures predominantly running less than 130/80 mmHg.  Continue current medical therapy.   History of whitecoat hypertension.    PVCs: Currently asymptomatic.  Secondary to underlying cardiomyopathy.    Continue beta-blockade.  Amiodarone was discontinued due to his potential risk of long-term toxicities, he did have an interval increase in his PVC burden after discontinuation of amiodarone.    Unclear what percentage of his underlying cardiomyopathy is due to history of valvular heart disease versus a developing PVC induced cardiomyopathy but it appears his EF is improving current medical therapy compared to his immediate postop EF after a transient drop to a letty EF of 23% in November 2018.  EP consultation complete, PVC ablation and antiarrhythmic were not recommended.      Persistent atrial fibrillation: asymptomatic.  Potentially transitioning to permanent A-fib.  Off anticoagulation status post left atrial clipping.    Mitral regurgitation: Persistent moderate to severe mitral regurgitation status post mitral valve repair.  Currently asymptomatic, [place] heart class I.  Will trend LV systolic function closely, would likely require a surgical mitral valve replacement in the future unless valve can be approached percutaneously.  Echo every 12 months, repeat pending.    Hyperlipidemia: Goal LDL less than 70.  Has agreed to start atorvastatin 40 mg p.o. daily.    History of CVA, recent TIA: Continue aspirin 81 mg p.o. daily.  Status post left atrial appendage clipping, should not require chronic anticoagulation.  If he has any recurrent symptoms concerning for TIA we have discussed proceeding with transesophageal echocardiographic evaluation.  Afterload well controlled based on home blood pressure monitoring.  Continue atorvastatin 40 mg p.o. daily.    Subjective  78-year-old gentleman with long-standing history of mitral valve prolapse with significant mitral regurgitation who underwent mitral valve repair and placement of a Xiong Mitral Valve Annuloplasty Ring (size #35) and NEELA clip at [place]  [facility] in August 2018.  Preoperative EF of 65%, immediately postoperative 45%.  Did develop postoperative atrial fibrillation which was treated by amiodarone and elective cardioversion on September 2, 2018 with maintenance of sinus rhythm since that time.  He was on a transient course of Coumadin but eventually decided to go off of this with his primary cardiologist due to dietary limitations.  Follow-up EF assessment by echo and MUGA scan in October and November 2018 revealed an EF of 25 to 30% by echo and an EF of 23% by MUGA scan.  Has had previous intermittent issues with blood pressure lability.  Still walking on a regular basis and doing daily calisthenics.  Blood pressures at home running less than 125/80 mmHg on current medical therapy.  Tolerating current medical regimen very well.  Has had recurrent episodes of expressive aphasia, potentially concerning for TIA.  CT head and neck revealing nonobstructive extracranial atherosclerosis and iCAD.  He describes stable functional status, still exercising on a regular basis.  Denies palpitations, dyspnea or chest discomfort.  Recently developed issues with urinary retention related to prostatic hypertrophy which resolved on tamsulosin.    PHYSICAL EXAMINATION:  Vitals:    01/23/24 1434   BP: 140/72   BP Location: Right arm   Patient Position: Sitting   Pulse: 70   SpO2: 98%   Weight: 65.3 kg (144 lb)   Height: 172.7 cm (68\")     General Appearance:    Alert, cooperative, no distress, appears stated age   Head:    Normocephalic, without obvious abnormality, atraumatic   Eyes:    conjunctiva/corneas clear   Nose:   Nares normal, septum midline, mucosa normal, no drainage   Throat:   Lips, teeth and gums normal   Neck:   Supple, symmetrical, trachea midline, no carotid    bruit or JVD   Lungs:     Clear to auscultation bilaterally, respirations unlabored   Chest Wall:    No tenderness or deformity    Heart:   Irregularly irregular, S1 and S2 normal, III/VI hs murmur " apex, rub   or gallop, normal carotid impulse bilaterally without bruit.   Abdomen:     Soft, non-tender   Extremities:   Extremities normal, atraumatic, no cyanosis or edema   Pulses:   2+ and symmetric all extremities   Skin:   Skin color, texture, turgor normal, no rashes or lesions       Diagnostic Data:  Procedures  Lab Results   Component Value Date    TRIG 96 05/10/2023    HDL 46 05/10/2023     Lab Results   Component Value Date    GLUCOSE 87 09/04/2023    BUN 14 09/04/2023    CREATININE 0.79 09/04/2023     09/04/2023    K 3.7 09/04/2023     09/04/2023    CO2 25.0 09/04/2023     Lab Results   Component Value Date    HGBA1C 4.9 01/10/2023     Lab Results   Component Value Date    WBC 8.07 09/08/2023    HGB 14.3 09/08/2023    HCT 43.2 09/08/2023     09/08/2023       Allergies  No Known Allergies    Current Medications    Current Outpatient Medications:     aspirin 81 MG EC tablet, Take 1 tablet by mouth Daily., Disp: , Rfl:     carvedilol (COREG) 12.5 MG tablet, TAKE ONE-HALF (1/2) TABLET (6.25 MG) IN THE MORNING AND 1 TABLET (12.5 MG) IN THE EVENING, Disp: 135 tablet, Rfl: 3    cloNIDine (CATAPRES) 0.1 MG tablet, TAKE ONE AND ONE-HALF TABLETS (0.15 MG) EVERY MORNING AND TAKE ONE TABLET AT BEDTIME, Disp: 225 tablet, Rfl: 1    enalapril (VASOTEC) 5 MG tablet, TAKE 1 TABLET DAILY, Disp: 90 tablet, Rfl: 1    tamsulosin (FLOMAX) 0.4 MG capsule 24 hr capsule, Take 1 capsule by mouth Every Other Day., Disp: , Rfl:     atorvastatin (LIPITOR) 40 MG tablet, Take 1 tablet by mouth Daily., Disp: 30 tablet, Rfl: 11          ROS  Review of Systems   Eyes:  Negative for visual disturbance.   Cardiovascular:  Positive for irregular heartbeat. Negative for chest pain, dyspnea on exertion and palpitations.   Respiratory:  Negative for cough and shortness of breath.          SOCIAL HX  Social History     Socioeconomic History    Marital status:    Tobacco Use    Smoking status: Never    Smokeless  tobacco: Never   Vaping Use    Vaping Use: Never used   Substance and Sexual Activity    Alcohol use: Not Currently     Comment: Insignificant amounts    Drug use: No    Sexual activity: Yes     Partners: Female       FAMILY HX  Family History   Problem Relation Age of Onset    Heart attack Father         46    Transient ischemic attack Mother     Cancer Mother         83             Howard Rivera III, MD, FACC

## 2024-03-06 ENCOUNTER — HOSPITAL ENCOUNTER (OUTPATIENT)
Dept: CARDIOLOGY | Facility: HOSPITAL | Age: 79
Discharge: HOME | End: 2024-03-06
Admitting: INTERNAL MEDICINE
Payer: MEDICARE

## 2024-03-06 VITALS — BODY MASS INDEX: 21.82 KG/M2 | WEIGHT: 144 LBS | HEIGHT: 68 IN

## 2024-03-06 DIAGNOSIS — I34.0 NONRHEUMATIC MITRAL VALVE REGURGITATION: ICD-10-CM

## 2024-03-06 LAB
ASCENDING AORTA: 3.5 CM
BH CV ECHO MEAS - AI P1/2T: 628.6 MSEC
BH CV ECHO MEAS - AO MAX PG: 3.3 MMHG
BH CV ECHO MEAS - AO MEAN PG: 1.8 MMHG
BH CV ECHO MEAS - AO ROOT AREA (BSA CORRECTED): 2.3 CM2
BH CV ECHO MEAS - AO ROOT DIAM: 4.1 CM
BH CV ECHO MEAS - AO V2 MAX: 91.1 CM/SEC
BH CV ECHO MEAS - AO V2 VTI: 20.2 CM
BH CV ECHO MEAS - AVA(I,D): 2.29 CM2
BH CV ECHO MEAS - EDV(CUBED): 107.9 ML
BH CV ECHO MEAS - EDV(MOD-SP2): 126 ML
BH CV ECHO MEAS - EDV(MOD-SP4): 131 ML
BH CV ECHO MEAS - EF(MOD-BP): 56 %
BH CV ECHO MEAS - EF(MOD-SP2): 54 %
BH CV ECHO MEAS - EF(MOD-SP4): 58.8 %
BH CV ECHO MEAS - ESV(CUBED): 34 ML
BH CV ECHO MEAS - ESV(MOD-SP2): 58 ML
BH CV ECHO MEAS - ESV(MOD-SP4): 54 ML
BH CV ECHO MEAS - FS: 31.9 %
BH CV ECHO MEAS - IVS/LVPW: 0.98 CM
BH CV ECHO MEAS - IVSD: 1.26 CM
BH CV ECHO MEAS - LA DIMENSION: 5.6 CM
BH CV ECHO MEAS - LAT PEAK E' VEL: 13.3 CM/SEC
BH CV ECHO MEAS - LV DIASTOLIC VOL/BSA (35-75): 73.7 CM2
BH CV ECHO MEAS - LV MASS(C)D: 234.5 GRAMS
BH CV ECHO MEAS - LV MAX PG: 1.86 MMHG
BH CV ECHO MEAS - LV MEAN PG: 1 MMHG
BH CV ECHO MEAS - LV SYSTOLIC VOL/BSA (12-30): 30.4 CM2
BH CV ECHO MEAS - LV V1 MAX: 67.5 CM/SEC
BH CV ECHO MEAS - LV V1 VTI: 13.4 CM
BH CV ECHO MEAS - LVIDD: 4.8 CM
BH CV ECHO MEAS - LVIDS: 3.2 CM
BH CV ECHO MEAS - LVOT AREA: 3.5 CM2
BH CV ECHO MEAS - LVOT DIAM: 2.1 CM
BH CV ECHO MEAS - LVPWD: 1.28 CM
BH CV ECHO MEAS - MED PEAK E' VEL: 7.8 CM/SEC
BH CV ECHO MEAS - MR MAX PG: 138.2 MMHG
BH CV ECHO MEAS - MR MAX VEL: 587.5 CM/SEC
BH CV ECHO MEAS - MR MEAN PG: 80.5 MMHG
BH CV ECHO MEAS - MR MEAN VEL: 415 CM/SEC
BH CV ECHO MEAS - MR VTI: 194.5 CM
BH CV ECHO MEAS - MV DEC SLOPE: 914 CM/SEC2
BH CV ECHO MEAS - MV DEC TIME: 0.15 SEC
BH CV ECHO MEAS - MV E MAX VEL: 145 CM/SEC
BH CV ECHO MEAS - MV MAX PG: 9.3 MMHG
BH CV ECHO MEAS - MV MEAN PG: 2.5 MMHG
BH CV ECHO MEAS - MV P1/2T: 45.2 MSEC
BH CV ECHO MEAS - MV V2 VTI: 26.5 CM
BH CV ECHO MEAS - MVA(P1/2T): 4.9 CM2
BH CV ECHO MEAS - MVA(VTI): 1.75 CM2
BH CV ECHO MEAS - PA ACC SLOPE: 358 CM/SEC2
BH CV ECHO MEAS - PA ACC TIME: 0.1 SEC
BH CV ECHO MEAS - PA V2 MAX: 92 CM/SEC
BH CV ECHO MEAS - PI END-D VEL: 140 CM/SEC
BH CV ECHO MEAS - RAP SYSTOLE: 15 MMHG
BH CV ECHO MEAS - RVSP: 38 MMHG
BH CV ECHO MEAS - SI(MOD-SP2): 38.3 ML/M2
BH CV ECHO MEAS - SI(MOD-SP4): 43.3 ML/M2
BH CV ECHO MEAS - SV(LVOT): 46.2 ML
BH CV ECHO MEAS - SV(MOD-SP2): 68 ML
BH CV ECHO MEAS - SV(MOD-SP4): 77 ML
BH CV ECHO MEAS - TAPSE (>1.6): 1.3 CM
BH CV ECHO MEAS - TR MAX PG: 23.2 MMHG
BH CV ECHO MEAS - TR MAX VEL: 241 CM/SEC
BH CV ECHO MEASUREMENTS AVERAGE E/E' RATIO: 13.74
BH CV XLRA - RV BASE: 4 CM
BH CV XLRA - RV LENGTH: 7.6 CM
BH CV XLRA - RV MID: 2.7 CM
BH CV XLRA - TDI S': 7.99 CM/SEC
LEFT ATRIUM VOLUME INDEX: 92.7 ML/M2

## 2024-03-06 PROCEDURE — 93306 TTE W/DOPPLER COMPLETE: CPT

## 2024-03-06 PROCEDURE — 93306 TTE W/DOPPLER COMPLETE: CPT | Performed by: INTERNAL MEDICINE

## 2024-03-07 ENCOUNTER — TELEPHONE (OUTPATIENT)
Dept: CARDIOLOGY | Facility: CLINIC | Age: 79
End: 2024-03-07
Payer: MEDICARE

## 2024-03-07 NOTE — TELEPHONE ENCOUNTER
----- Message from Howard Rivera III, MD sent at 3/7/2024  9:27 AM EST -----  Stable heart and valve function noted on echo.

## 2024-03-21 RX ORDER — ENALAPRIL MALEATE 5 MG/1
TABLET ORAL
Qty: 90 TABLET | Refills: 1 | Status: SHIPPED | OUTPATIENT
Start: 2024-03-21

## 2024-04-16 ENCOUNTER — TELEPHONE (OUTPATIENT)
Dept: CARDIOLOGY | Facility: CLINIC | Age: 79
End: 2024-04-16

## 2024-04-16 NOTE — TELEPHONE ENCOUNTER
Bran today 04/16/2024 regarding new address for the [place] office.  Patient has an apointment with Dr. Rivera on 08/27/2024 2:15pm.  Patient was given new address, appointment info and directions. Patient understand where the new location is for the above appointment.[place]ary.

## 2024-04-29 DIAGNOSIS — I10 ESSENTIAL HYPERTENSION: ICD-10-CM

## 2024-04-29 RX ORDER — CLONIDINE HYDROCHLORIDE 0.1 MG/1
TABLET ORAL
Qty: 225 TABLET | Refills: 1 | Status: SHIPPED | OUTPATIENT
Start: 2024-04-29

## 2024-05-13 ENCOUNTER — OFFICE VISIT (OUTPATIENT)
Dept: FAMILY MEDICINE CLINIC | Facility: CLINIC | Age: 79
End: 2024-05-13
Payer: MEDICARE

## 2024-05-13 VITALS
WEIGHT: 144.4 LBS | HEIGHT: 68 IN | TEMPERATURE: 98.2 F | HEART RATE: 74 BPM | DIASTOLIC BLOOD PRESSURE: 94 MMHG | OXYGEN SATURATION: 99 % | SYSTOLIC BLOOD PRESSURE: 138 MMHG | RESPIRATION RATE: 20 BRPM | BODY MASS INDEX: 21.89 KG/M2

## 2024-05-13 DIAGNOSIS — I48.0 PAROXYSMAL ATRIAL FIBRILLATION: ICD-10-CM

## 2024-05-13 DIAGNOSIS — I10 PRIMARY HYPERTENSION: Chronic | ICD-10-CM

## 2024-05-13 DIAGNOSIS — Z12.5 SCREENING FOR PROSTATE CANCER: ICD-10-CM

## 2024-05-13 DIAGNOSIS — Z00.00 MEDICARE ANNUAL WELLNESS VISIT, SUBSEQUENT: Primary | ICD-10-CM

## 2024-05-13 LAB
ALBUMIN SERPL-MCNC: 4.3 G/DL (ref 3.5–5.2)
ALBUMIN/GLOB SERPL: 1.8 G/DL
ALP SERPL-CCNC: 84 U/L (ref 39–117)
ALT SERPL W P-5'-P-CCNC: 19 U/L (ref 1–41)
ANION GAP SERPL CALCULATED.3IONS-SCNC: 7.7 MMOL/L (ref 5–15)
AST SERPL-CCNC: 19 U/L (ref 1–40)
BASOPHILS # BLD AUTO: 0.03 10*3/MM3 (ref 0–0.2)
BASOPHILS NFR BLD AUTO: 0.4 % (ref 0–1.5)
BILIRUB BLD-MCNC: NEGATIVE MG/DL
BILIRUB SERPL-MCNC: 3.9 MG/DL (ref 0–1.2)
BUN SERPL-MCNC: 16 MG/DL (ref 8–23)
BUN/CREAT SERPL: 19.3 (ref 7–25)
CALCIUM SPEC-SCNC: 10.2 MG/DL (ref 8.6–10.5)
CHLORIDE SERPL-SCNC: 102 MMOL/L (ref 98–107)
CHOLEST SERPL-MCNC: 164 MG/DL (ref 0–200)
CLARITY, POC: CLEAR
CO2 SERPL-SCNC: 27.3 MMOL/L (ref 22–29)
COLOR UR: NORMAL
CREAT SERPL-MCNC: 0.83 MG/DL (ref 0.76–1.27)
DEPRECATED RDW RBC AUTO: 42.8 FL (ref 37–54)
EGFRCR SERPLBLD CKD-EPI 2021: 89.6 ML/MIN/1.73
EOSINOPHIL # BLD AUTO: 0.12 10*3/MM3 (ref 0–0.4)
EOSINOPHIL NFR BLD AUTO: 1.7 % (ref 0.3–6.2)
ERYTHROCYTE [DISTWIDTH] IN BLOOD BY AUTOMATED COUNT: 13 % (ref 12.3–15.4)
GLOBULIN UR ELPH-MCNC: 2.4 GM/DL
GLUCOSE SERPL-MCNC: 102 MG/DL (ref 65–99)
GLUCOSE UR STRIP-MCNC: NEGATIVE MG/DL
HCT VFR BLD AUTO: 46.6 % (ref 37.5–51)
HCV AB SER QL: NORMAL
HDLC SERPL-MCNC: 42 MG/DL (ref 40–60)
HGB BLD-MCNC: 15.6 G/DL (ref 13–17.7)
IMM GRANULOCYTES # BLD AUTO: 0.02 10*3/MM3 (ref 0–0.05)
IMM GRANULOCYTES NFR BLD AUTO: 0.3 % (ref 0–0.5)
KETONES UR QL: NEGATIVE
LDLC SERPL CALC-MCNC: 105 MG/DL (ref 0–100)
LDLC/HDLC SERPL: 2.49 {RATIO}
LEUKOCYTE EST, POC: NEGATIVE
LYMPHOCYTES # BLD AUTO: 0.99 10*3/MM3 (ref 0.7–3.1)
LYMPHOCYTES NFR BLD AUTO: 14.1 % (ref 19.6–45.3)
MCH RBC QN AUTO: 30.1 PG (ref 26.6–33)
MCHC RBC AUTO-ENTMCNC: 33.5 G/DL (ref 31.5–35.7)
MCV RBC AUTO: 90 FL (ref 79–97)
MONOCYTES # BLD AUTO: 0.52 10*3/MM3 (ref 0.1–0.9)
MONOCYTES NFR BLD AUTO: 7.4 % (ref 5–12)
NEUTROPHILS NFR BLD AUTO: 5.32 10*3/MM3 (ref 1.7–7)
NEUTROPHILS NFR BLD AUTO: 76.1 % (ref 42.7–76)
NITRITE UR-MCNC: NEGATIVE MG/ML
NRBC BLD AUTO-RTO: 0 /100 WBC (ref 0–0.2)
PH UR: 6 [PH] (ref 5–8)
PLATELET # BLD AUTO: 146 10*3/MM3 (ref 140–450)
PMV BLD AUTO: 11.4 FL (ref 6–12)
POTASSIUM SERPL-SCNC: 5 MMOL/L (ref 3.5–5.2)
PROT SERPL-MCNC: 6.7 G/DL (ref 6–8.5)
PROT UR STRIP-MCNC: NEGATIVE MG/DL
RBC # BLD AUTO: 5.18 10*6/MM3 (ref 4.14–5.8)
RBC # UR STRIP: NEGATIVE /UL
SODIUM SERPL-SCNC: 137 MMOL/L (ref 136–145)
SP GR UR: 1.02 (ref 1–1.03)
TRIGL SERPL-MCNC: 88 MG/DL (ref 0–150)
TSH SERPL DL<=0.05 MIU/L-ACNC: 3.78 UIU/ML (ref 0.27–4.2)
UROBILINOGEN UR QL: NORMAL
VLDLC SERPL-MCNC: 17 MG/DL (ref 5–40)
WBC NRBC COR # BLD AUTO: 7 10*3/MM3 (ref 3.4–10.8)

## 2024-05-13 PROCEDURE — 85025 COMPLETE CBC W/AUTO DIFF WBC: CPT | Performed by: FAMILY MEDICINE

## 2024-05-13 PROCEDURE — 1170F FXNL STATUS ASSESSED: CPT | Performed by: FAMILY MEDICINE

## 2024-05-13 PROCEDURE — 80061 LIPID PANEL: CPT | Performed by: FAMILY MEDICINE

## 2024-05-13 PROCEDURE — 84443 ASSAY THYROID STIM HORMONE: CPT | Performed by: FAMILY MEDICINE

## 2024-05-13 PROCEDURE — G0103 PSA SCREENING: HCPCS | Performed by: FAMILY MEDICINE

## 2024-05-13 PROCEDURE — G0439 PPPS, SUBSEQ VISIT: HCPCS | Performed by: FAMILY MEDICINE

## 2024-05-13 PROCEDURE — 99213 OFFICE O/P EST LOW 20 MIN: CPT | Performed by: FAMILY MEDICINE

## 2024-05-13 PROCEDURE — 3079F DIAST BP 80-89 MM HG: CPT | Performed by: FAMILY MEDICINE

## 2024-05-13 PROCEDURE — 80053 COMPREHEN METABOLIC PANEL: CPT | Performed by: FAMILY MEDICINE

## 2024-05-13 PROCEDURE — 3075F SYST BP GE 130 - 139MM HG: CPT | Performed by: FAMILY MEDICINE

## 2024-05-13 PROCEDURE — 86803 HEPATITIS C AB TEST: CPT | Performed by: FAMILY MEDICINE

## 2024-05-13 PROCEDURE — 81002 URINALYSIS NONAUTO W/O SCOPE: CPT | Performed by: FAMILY MEDICINE

## 2024-05-13 RX ORDER — ENALAPRIL MALEATE 10 MG/1
10 TABLET ORAL DAILY
Qty: 90 TABLET | Refills: 0 | Status: SHIPPED | OUTPATIENT
Start: 2024-05-13 | End: 2024-08-11

## 2024-05-13 NOTE — PROGRESS NOTES
The ABCs of the Annual Wellness Visit  Subsequent Medicare Wellness Visit    Subjective    Michael Mallory is a 78 y.o. male who presents for a Subsequent Medicare Wellness Visit.    The following portions of the patient's history were reviewed and   updated as appropriate: allergies, current medications, past family history, past medical history, past social history, past surgical history, and problem list.    Compared to one year ago, the patient feels his physical   health is worse.    Compared to one year ago, the patient feels his mental   health is the same.    Recent Hospitalizations:  This patient has had a [facility] admission record on file within the last 365 days.    Current Medical Providers:  Patient Care Team:  Jordi Ruiz MD as PCP - General (Family Medicine)  Howard Rivera III, MD as Cardiologist (Cardiology)    Outpatient Medications Prior to Visit   Medication Sig Dispense Refill    aspirin 81 MG EC tablet Take 1 tablet by mouth Daily.      carvedilol (COREG) 12.5 MG tablet TAKE ONE-HALF (1/2) TABLET (6.25 MG) IN THE MORNING AND 1 TABLET (12.5 MG) IN THE EVENING 135 tablet 3    cloNIDine (CATAPRES) 0.1 MG tablet TAKE ONE AND ONE-HALF TABLETS (0.15 MG) EVERY MORNING AND TAKE ONE TABLET AT BEDTIME 225 tablet 1    enalapril (VASOTEC) 5 MG tablet TAKE 1 TABLET DAILY 90 tablet 1    atorvastatin (LIPITOR) 40 MG tablet Take 1 tablet by mouth Daily. 30 tablet 11    tamsulosin (FLOMAX) 0.4 MG capsule 24 hr capsule Take 1 capsule by mouth Every Other Day.       No facility-administered medications prior to visit.       No opioid medication identified on active medication list. I have reviewed chart for other potential  high risk medication/s and harmful drug interactions in the elderly.        Aspirin is on active medication list. Aspirin use is indicated based on review of current medical condition/s. Pros and cons of this therapy have been discussed today. Benefits of this medication outweigh  "potential harm.  Patient has been encouraged to continue taking this medication.  .      Patient Active Problem List   Diagnosis    Paroxysmal atrial fibrillation    Hypertension    Mitral valve disorder    Heart murmur    Mitral valve insufficiency    Pulmonary hypertension    H/O mitral valve repair    Cardiomyopathy    Premature ventricular contractions    Valvular heart disease    Anxiety    Mitral valve prolapse    Acute GI bleeding     Advance Care Planning   Advance Care Planning     Advance Directive is not on file.  ACP discussion was held with the patient during this visit. Patient has an advance directive (not in EMR), copy requested.     Objective    Vitals:    24 0837 24 0852   BP: 150/84 138/94   BP Location: Right arm Right arm   Patient Position: Sitting Sitting   Cuff Size: Adult Adult   Pulse: 74    Resp: 20    Temp: 98.2 °F (36.8 °C)    TempSrc: Temporal    SpO2: 99%    Weight: 65.5 kg (144 lb 6.4 oz)    Height: 172.7 cm (67.99\")    PainSc: 0-No pain      Estimated body mass index is 21.96 kg/m² as calculated from the following:    Height as of this encounter: 172.7 cm (67.99\").    Weight as of this encounter: 65.5 kg (144 lb 6.4 oz).    BMI is within normal parameters. No other follow-up for BMI required.      Does the patient have evidence of cognitive impairment? No          HEALTH RISK ASSESSMENT    Smoking Status:  Social History     Tobacco Use   Smoking Status Never    Passive exposure: Never   Smokeless Tobacco Never     Alcohol Consumption:  Social History     Substance and Sexual Activity   Alcohol Use Not Currently    Comment: Insignificant amounts     Fall Risk Screen:    STEADI Fall Risk Assessment was completed, and patient is at LOW risk for falls.Assessment completed on:2024    Depression Screenin/13/2024     8:00 AM   PHQ-2/PHQ-9 Depression Screening   Little Interest or Pleasure in Doing Things 0-->not at all   Feeling Down, Depressed or Hopeless 0-->not " at all   Trouble Falling or Staying Asleep, or Sleeping Too Much 0-->not at all   Feeling Tired or Having Little Energy 0-->not at all   Poor Appetite or Overeating 0-->not at all   Feeling Bad about Yourself - or that You are a Failure or Have Let Yourself or Your Family Down 0-->not at all   Trouble Concentrating on Things, Such as Reading the Newspaper or Watching Television 0-->not at all   Moving or Speaking So Slowly that Other People Could Have Noticed? Or the Opposite - Being So Fidgety 0-->not at all   Thoughts that You Would be Better Off Dead or of Hurting Yourself in Some Way 0-->not at all   PHQ-9: Brief Depression Severity Measure Score 0   If You Checked Off Any Problems, How Difficult Have These Problems Made It For You to Do Your Work, Take Care of Things at Home, or Get Along with Other People? not difficult at all       Health Habits and Functional and Cognitive Screenin/13/2024     8:00 AM   Functional & Cognitive Status   Do you have difficulty preparing food and eating? No   Do you have difficulty bathing yourself, getting dressed or grooming yourself? No   Do you have difficulty using the toilet? No   Do you have difficulty moving around from place to place? No   Do you have trouble with steps or getting out of a bed or a chair? No   Current Diet Well Balanced Diet   Dental Exam Up to date   Eye Exam Up to date   Exercise (times per week) 7 times per week   Current Exercises Include Cardiovascular Workout;Home Exercise Program (TV, Computer, Etc.)   Do you need help using the phone?  No   Are you deaf or do you have serious difficulty hearing?  No   Do you need help to go to places out of walking distance? No   Do you need help shopping? No   Do you need help preparing meals?  No   Do you need help with housework?  No   Do you need help with laundry? Yes   Do you need help taking your medications? No   Do you need help managing money? No   Do you ever drive or ride in a car without  wearing a seat belt? No   Have you felt unusual stress, anger or loneliness in the last month? No   Who do you live with? Spouse   If you need help, do you have trouble finding someone available to you? No   Have you been bothered in the last four weeks by sexual problems? No   Do you have difficulty concentrating, remembering or making decisions? No       Age-appropriate Screening Schedule:  Refer to the list below for future screening recommendations based on patient's age, sex and/or medical conditions. Orders for these recommended tests are listed in the plan section. The patient has been provided with a written plan.    Health Maintenance   Topic Date Due    ZOSTER VACCINE (1 of 2) Never done    RSV Vaccine - Adults (1 - 1-dose 60+ series) Never done    Pneumococcal Vaccine 65+ (1 of 1 - PCV) Never done    ANNUAL WELLNESS VISIT  01/24/2024    INFLUENZA VACCINE  08/01/2024    COLORECTAL CANCER SCREENING  11/08/2028    TDAP/TD VACCINES (2 - Td or Tdap) 04/24/2033    HEPATITIS C SCREENING  Completed    COVID-19 Vaccine  Discontinued                  CMS Preventative Services Quick Reference  Risk Factors Identified During Encounter  Immunizations Discussed/Encouraged: Pneumococcal 23, Prevnar 20 (Pneumococcal 20-valent conjugate), Shingrix, and RSV (Respiratory Syncytial Virus)  The above risks/problems have been discussed with the patient.  Pertinent information has been shared with the patient in the After Visit Summary.  An After Visit Summary and PPPS were made available to the patient.    Follow Up:   Next Medicare Wellness visit to be scheduled in 1 year.       Additional E&M Note during same encounter follows:  Patient has multiple medical problems which are significant and separately identifiable that require additional work above and beyond the Medicare Wellness Visit.      Chief Complaint  Medicare Wellness-subsequent (Continued care of HTN, mitral valve disorder, A-fib )    Subjective   "      HPI  Michael Mallory is also being seen today for his hypertension and atrial fibrillation. The patient denies any chest pain, palpitations, shortness of breath, headaches or dizziness.         Objective   Vital Signs:  /94 (BP Location: Right arm, Patient Position: Sitting, Cuff Size: Adult)   Pulse 74   Temp 98.2 °F (36.8 °C) (Temporal)   Resp 20   Ht 172.7 cm (67.99\")   Wt 65.5 kg (144 lb 6.4 oz)   SpO2 99%   BMI 21.96 kg/m²     Physical Exam  Vitals and nursing note reviewed.   Constitutional:       General: He is not in acute distress.     Appearance: Normal appearance. He is well-developed and well-groomed.   HENT:      Head: Normocephalic and atraumatic.      Jaw: No tenderness or pain on movement.      Salivary Glands: Right salivary gland is not diffusely enlarged. Left salivary gland is not diffusely enlarged.      Right Ear: Tympanic membrane and ear canal normal.      Left Ear: Tympanic membrane and ear canal normal.      Nose: Nose normal. No congestion or rhinorrhea.      Mouth/Throat:      Mouth: Mucous membranes are moist.      Pharynx: Oropharynx is clear. No oropharyngeal exudate or posterior oropharyngeal erythema.   Eyes:      General: Lids are normal. No allergic shiner or scleral icterus.     Extraocular Movements: Extraocular movements intact.      Conjunctiva/sclera: Conjunctivae normal.      Pupils: Pupils are equal, round, and reactive to light.   Neck:      Thyroid: No thyroid mass, thyromegaly or thyroid tenderness.      Trachea: Trachea normal.   Cardiovascular:      Rate and Rhythm: Normal rate and regular rhythm. No extrasystoles are present.     Pulses: Normal pulses.      Heart sounds: Normal heart sounds. No murmur heard.     No systolic murmur is present with a grade of 3/6.   Pulmonary:      Effort: Pulmonary effort is normal. No respiratory distress.      Breath sounds: Normal breath sounds. No decreased breath sounds, wheezing, rhonchi or rales.   Chest: "   Breasts:     Right: Normal.      Left: Normal.   Abdominal:      General: Bowel sounds are normal.      Palpations: Abdomen is soft.      Tenderness: There is no abdominal tenderness. There is no right CVA tenderness, left CVA tenderness, guarding or rebound.      Hernia: No hernia is present.   Musculoskeletal:      Cervical back: Normal range of motion and neck supple.      Right lower leg: No edema.      Left lower leg: No edema.   Feet:      Right foot:      Skin integrity: Skin integrity normal.      Toenail Condition: Right toenails are normal.      Left foot:      Toenail Condition: Left toenails are normal.   Lymphadenopathy:      Head:      Right side of head: No submental, submandibular, preauricular, posterior auricular or occipital adenopathy.      Left side of head: No submental, submandibular, preauricular, posterior auricular or occipital adenopathy.      Cervical: No cervical adenopathy.      Upper Body:      Right upper body: No supraclavicular or axillary adenopathy.      Left upper body: No supraclavicular or axillary adenopathy.   Skin:     General: Skin is warm.      Capillary Refill: Capillary refill takes less than 2 seconds.      Findings: No rash.      Nails: There is no clubbing.   Neurological:      General: No focal deficit present.      Mental Status: He is alert and oriented to person, place, and time.      Sensory: Sensation is intact.      Motor: Motor function is intact.      Coordination: Coordination is intact.      Gait: Gait is intact.      Deep Tendon Reflexes: Reflexes are normal and symmetric.   Psychiatric:         Attention and Perception: Attention and perception normal.         Mood and Affect: Mood and affect normal.         Speech: Speech normal.         Behavior: Behavior normal. Behavior is cooperative.         Thought Content: Thought content normal.         Cognition and Memory: Cognition normal.         Judgment: Judgment normal.                       Assessment  and Plan   There are no diagnoses linked to this encounter.       I spent 30 minutes caring for Michael on this date of service. This time includes time spent by me in the following activities:preparing for the visit, performing a medically appropriate examination and/or evaluation , counseling and educating the patient/family/caregiver, ordering medications, tests, or procedures, and documenting information in the medical record  Follow Up   No follow-ups on file.  Patient was given instructions and counseling regarding his condition or for health maintenance advice. Please see specific information pulled into the AVS if appropriate.     The patient is advised to continue all of his regular medications as prescribed. He was counseled regarding the importance of diet, exercise and medication compliance.    The preventive exam has been reviewed in detail.  The patient has been fully counseled on preventative guidelines for vaccines, cancer screenings, and other health maintenance needs.   The patient has been counseled on guidelines for maintaining a lifestyle to promote good health and to minimize chronic diseases.  The patient has been assisted with scheduling these healthcare procedures for the coming year and given a written document of health maintenance and anticipatory guidance for age with the AVS.      This document has been electronically signed by Jordi Ruiz MD  May 13, 2024 11:50 EDT

## 2024-05-13 NOTE — ASSESSMENT & PLAN NOTE
Hypertension is  uncontrolled  Medication changes per orders.  Dietary sodium restriction.  Blood pressure will be reassessedin 3 months.

## 2024-05-14 LAB — PSA SERPL-MCNC: 3.05 NG/ML (ref 0–4)

## 2024-06-09 ENCOUNTER — OUTSIDE FACILITY SERVICE (OUTPATIENT)
Dept: CARDIOLOGY | Facility: CLINIC | Age: 79
End: 2024-06-09
Payer: MEDICARE

## 2024-06-14 ENCOUNTER — TELEPHONE (OUTPATIENT)
Dept: FAMILY MEDICINE CLINIC | Facility: CLINIC | Age: 79
End: 2024-06-14
Payer: MEDICARE

## 2024-06-14 ENCOUNTER — TELEPHONE (OUTPATIENT)
Dept: FAMILY MEDICINE CLINIC | Facility: CLINIC | Age: 79
End: 2024-06-14

## 2024-06-14 NOTE — TELEPHONE ENCOUNTER
Caller: Michael Mallory    Relationship to patient: Self    Best call back number: [phone]     New or established patient?  [] New  [x] Established    Date of discharge: 06/09/2024    Facility discharged from: [facility]     Diagnosis/Symptoms: HIGH BLOOD PRESSURE AND HEART CONCERN     Length of stay (If applicable): 2 DAYS     Specialty Only: Did you see a [facility] provider?    [] Yes  [x] No  If so, who?

## 2024-06-18 ENCOUNTER — OFFICE VISIT (OUTPATIENT)
Dept: FAMILY MEDICINE CLINIC | Facility: CLINIC | Age: 79
End: 2024-06-18
Payer: MEDICARE

## 2024-06-18 VITALS
OXYGEN SATURATION: 100 % | RESPIRATION RATE: 18 BRPM | TEMPERATURE: 96.7 F | SYSTOLIC BLOOD PRESSURE: 125 MMHG | HEIGHT: 68 IN | HEART RATE: 62 BPM | DIASTOLIC BLOOD PRESSURE: 76 MMHG | WEIGHT: 145.2 LBS | BODY MASS INDEX: 22.01 KG/M2

## 2024-06-18 DIAGNOSIS — G45.9 TIA (TRANSIENT ISCHEMIC ATTACK): Primary | ICD-10-CM

## 2024-06-18 RX ORDER — ATORVASTATIN CALCIUM 40 MG/1
40 TABLET, FILM COATED ORAL NIGHTLY
COMMUNITY
Start: 2024-06-10

## 2024-06-18 RX ORDER — CLOPIDOGREL BISULFATE 75 MG/1
75 TABLET ORAL DAILY
COMMUNITY
Start: 2024-06-10

## 2024-06-18 NOTE — PROGRESS NOTES
"Chief Complaint  Follow-up (Hospital follow up for possible TIA)    Subjective        Michael Mallory presents to [facility]  History of Present Illness  The patient is a 78 y.o. male who is here today for follow up. The patient was in the hospital because he was unable to sequence his thought and he thought he was having a stroke. He was admitted for observation for a day but we have not gotten any notes from the hospital. The patient stated that they thought he had a TIA because all his tests came back negative.  He states that he is doing better since then and he has not had any episodes since he was discharged from the hospital.      Objective   Vital Signs:  /76 (BP Location: Left arm, Patient Position: Sitting, Cuff Size: Adult)   Pulse 62   Temp 96.7 °F (35.9 °C) (Temporal)   Resp 18   Ht 172.7 cm (67.99\")   Wt 65.9 kg (145 lb 3.2 oz)   SpO2 100%   BMI 22.08 kg/m²   Estimated body mass index is 22.08 kg/m² as calculated from the following:    Height as of this encounter: 172.7 cm (67.99\").    Weight as of this encounter: 65.9 kg (145 lb 3.2 oz).       BMI is within normal parameters. No other follow-up for BMI required.      Physical Exam  Vitals and nursing note reviewed.   Constitutional:       General: He is not in acute distress.     Appearance: Normal appearance. He is well-developed and well-groomed.   HENT:      Head: Normocephalic and atraumatic.      Jaw: No tenderness or pain on movement.      Salivary Glands: Right salivary gland is not diffusely enlarged. Left salivary gland is not diffusely enlarged.      Right Ear: Tympanic membrane and ear canal normal.      Left Ear: Tympanic membrane and ear canal normal.      Nose: No congestion or rhinorrhea.      Mouth/Throat:      Mouth: Mucous membranes are moist.      Pharynx: No oropharyngeal exudate or posterior oropharyngeal erythema.   Eyes:      General: Lids are normal. No allergic shiner or scleral " icterus.     Conjunctiva/sclera: Conjunctivae normal.      Pupils: Pupils are equal, round, and reactive to light.   Neck:      Thyroid: No thyroid mass, thyromegaly or thyroid tenderness.      Trachea: Trachea normal.   Cardiovascular:      Rate and Rhythm: Normal rate and regular rhythm. No extrasystoles are present.     Pulses: Normal pulses.      Heart sounds: Normal heart sounds. No murmur heard.  Pulmonary:      Effort: Pulmonary effort is normal. No respiratory distress.      Breath sounds: Normal breath sounds. No decreased breath sounds, wheezing, rhonchi or rales.   Chest:   Breasts:     Right: Normal.      Left: Normal.   Abdominal:      General: Bowel sounds are normal.      Palpations: Abdomen is soft.      Tenderness: There is no abdominal tenderness. There is no right CVA tenderness, left CVA tenderness, guarding or rebound.   Musculoskeletal:      Cervical back: Neck supple.      Right lower leg: No edema.      Left lower leg: No edema.   Lymphadenopathy:      Cervical: No cervical adenopathy.      Upper Body:      Right upper body: No supraclavicular or axillary adenopathy.      Left upper body: No supraclavicular or axillary adenopathy.   Skin:     General: Skin is warm.      Nails: There is no clubbing.   Neurological:      General: No focal deficit present.      Mental Status: He is alert and oriented to person, place, and time.      Sensory: Sensation is intact.      Motor: Motor function is intact.      Coordination: Coordination is intact.   Psychiatric:         Attention and Perception: Attention and perception normal.         Mood and Affect: Mood normal.         Speech: Speech normal.         Behavior: Behavior normal. Behavior is cooperative.         Thought Content: Thought content normal.      Result Review :                     Assessment and Plan     Diagnoses and all orders for this visit:    1. TIA (transient ischemic attack) (Primary)  Comments:  Patient doing better has not had any  episodes since hospitalization.           I spent 20 minutes caring for Michael on this date of service. This time includes time spent by me in the following activities:preparing for the visit, performing a medically appropriate examination and/or evaluation , counseling and educating the patient/family/caregiver, and documenting information in the medical record  Follow Up     No follow-ups on file.  Patient was given instructions and counseling regarding his condition or for health maintenance advice. Please see specific information pulled into the AVS if appropriate.     The patient is advised to continue all of his regular medications as prescribed. He was counseled regarding the importance of diet, exercise and medication compliance.      This document has been electronically signed by Jordi Ruiz MD  June 24, 2024 12:08 EDT

## 2024-06-19 ENCOUNTER — TELEPHONE (OUTPATIENT)
Dept: FAMILY MEDICINE CLINIC | Facility: CLINIC | Age: 79
End: 2024-06-19

## 2024-06-19 NOTE — TELEPHONE ENCOUNTER
Caller: Michael Mallory    Relationship: Self    Best call back number: [phone]     What is the best time to reach you: ANYTIME     Who are you requesting to speak with (clinical staff, provider,  specific staff member): CLINICAL STAFF    What was the call regarding: PATIENT STATES THAT HE WAS SEEN AT THE [facility]. HE IS ASKING TO HAVE THE OFFICE REQUEST HIS RECORDS FROM WHEN HE WAS THERE. HE SAYS THAT THE FAX NUMBER [phone]    Is it okay if the provider responds through [employer]: YES

## 2024-07-01 ENCOUNTER — TELEPHONE (OUTPATIENT)
Dept: CARDIOLOGY | Facility: CLINIC | Age: 79
End: 2024-07-01
Payer: MEDICARE

## 2024-07-01 NOTE — TELEPHONE ENCOUNTER
Went to [facility] a few weeks ago for cognitive issues. He went home on a Rx for Plavix 75 mg daily. It was a 3 weeks supply. Wants to know if you want him to continue it and if so needs a new Rx. Requested records. Please advise.

## 2024-07-26 RX ORDER — CARVEDILOL 12.5 MG/1
TABLET ORAL
Qty: 135 TABLET | Refills: 3 | Status: SHIPPED | OUTPATIENT
Start: 2024-07-26

## 2024-08-13 ENCOUNTER — OFFICE VISIT (OUTPATIENT)
Dept: CARDIOLOGY | Facility: CLINIC | Age: 79
End: 2024-08-13
Payer: MEDICARE

## 2024-08-13 VITALS
HEIGHT: 68 IN | DIASTOLIC BLOOD PRESSURE: 90 MMHG | HEART RATE: 82 BPM | WEIGHT: 143 LBS | OXYGEN SATURATION: 98 % | BODY MASS INDEX: 21.67 KG/M2 | SYSTOLIC BLOOD PRESSURE: 140 MMHG

## 2024-08-13 DIAGNOSIS — I34.0 NONRHEUMATIC MITRAL VALVE REGURGITATION: ICD-10-CM

## 2024-08-13 DIAGNOSIS — I42.0 DILATED CARDIOMYOPATHY: ICD-10-CM

## 2024-08-13 DIAGNOSIS — I48.0 PAROXYSMAL ATRIAL FIBRILLATION: ICD-10-CM

## 2024-08-13 DIAGNOSIS — Z98.890 H/O MITRAL VALVE REPAIR: Primary | ICD-10-CM

## 2024-08-13 PROCEDURE — 99214 OFFICE O/P EST MOD 30 MIN: CPT | Performed by: INTERNAL MEDICINE

## 2024-08-13 PROCEDURE — G2211 COMPLEX E/M VISIT ADD ON: HCPCS | Performed by: INTERNAL MEDICINE

## 2024-08-13 PROCEDURE — 3077F SYST BP >= 140 MM HG: CPT | Performed by: INTERNAL MEDICINE

## 2024-08-13 PROCEDURE — 3080F DIAST BP >= 90 MM HG: CPT | Performed by: INTERNAL MEDICINE

## 2024-08-13 RX ORDER — ENALAPRIL MALEATE 20 MG/1
20 TABLET ORAL DAILY
Qty: 90 TABLET | Refills: 3 | Status: SHIPPED | OUTPATIENT
Start: 2024-08-13

## 2024-08-13 RX ORDER — ATORVASTATIN CALCIUM 20 MG/1
20 TABLET, FILM COATED ORAL DAILY
Qty: 90 TABLET | Refills: 4 | Status: SHIPPED | OUTPATIENT
Start: 2024-08-13

## 2024-08-13 NOTE — PROGRESS NOTES
[facility]  Office visit  Michael Mallory  1945    There is no work phone number on file.    VISIT DATE:  8/13/2024    PCP: Jordi Ruiz MD  [address]  [place] [address]    CC:  Chief Complaint   Patient presents with    Cardiomyopathy     6 month follow up     Cardiac studies and procedures:  October 2017 echo: EF 55 to 60%, grade 2 diastolic dysfunction, mild LVH, mild LV dilation-LVIDd 5.9 cm, LVIDs 3.7 cm, moderate to severe left atrial enlargement, moderate right atrial enlargement, prolapse of P2 segment with severe mitral regurgitation, estimated RVSP 50 to 55 mmHg.    August 2018 [facility]: Mitral valve repair with placement of a 35 mm Xiong annuloplasty ring, exclusion of left atrial appendage.    September 2018 echo [facility]: LVEF 45 ± 5, trivial MR status post mitral valve repair.    October 2018 echo  The left ventricular cavity is severely dilated. Global left ventricular ejection fraction is estimated at 25-30%.  Left ventricular wall thickness is consistent with mild concentric hypertrophy.  Left ventricular diastolic dysfunction (grade II) consistent with pseudonormalization.  Right ventricular cavity is borderline dilated.  Moderately reduced right ventricular systolic function noted.  Left atrial cavity size is moderate-to-severely dilated.  Moderate mitral valve regurgitation is present  Mild to moderate tricuspid valve regurgitation is present.  Mild dilation of the aortic root is present. No dissection or aneurysm is identified.    November 2018 MUGA  #1.  Dilated left ventricle.  #2.  Global left ventricular ejection fraction of 23%.    May 2019 echo  Calculated EF = 47.0%. Estimated EF appears to be in the range of 46 - 50%  The left ventricular cavity is borderline dilated.  Left ventricular diastolic dysfunction (grade II) consistent with pseudonormalization.  Moderate-to-severe mitral valve regurgitation is present  Left atrial cavity size is moderately  dilated.  Calculated right ventricular systolic pressure from tricuspid regurgitation is 35 mmHg.    October 2019 2-week  ECG monitor:An abnormal monitor study. Frequent PVCs, 33.5%, which are rarely symptomatic.    November 2019 echo  Estimated EF appears to be in the range of 46 - 50%.  Left ventricular wall thickness is consistent with borderline concentric hypertrophy.  The left ventricular cavity is borderline dilated.  Left atrial cavity size is severely dilated.  Moderate-to-severe mitral valve regurgitation is present  Xiong Mitral Valve Annuloplasty Ring (size #35) and NEELA clip at [facility] in August 2018.    October 2020 echo  Calculated left ventricular 3D EF = 60% Left ventricular ejection fraction appears to be 56 - 60%. Left ventricular systolic function is normal.  Left ventricular diastolic function is consistent with (grade III w/high LAP) restrictive pattern.  The left ventricular cavity is borderline dilated.  The left atrial cavity is severely dilated.  Moderate to severe mitral valve regurgitation is present with an anteriorly-directed jet noted.  Estimated right ventricular systolic pressure from tricuspid regurgitation is normal (<35 mmHg).  Mild dilation of the ascending aorta is present.    November 2021 TTE  Left ventricular ejection fraction appears to be 56 - 60%. Left ventricular systolic function is normal.  Left ventricular wall thickness is consistent with borderline concentric hypertrophy.  Left atrial volume is severely increased.  Status post mitral valve repair with 35 mm annuloplasty ring.  Severe mitral valve regurgitation is present.    December 2022   CT head neck  -Apparent focal severe stenosis of mid intradural left vertebral artery.  Mild to moderate stenosis of distal intradural right vertebral artery. Mild stenosis of proximal basilar artery.   -Moderate stenosis of right and mild stenosis of left supraclinoid internal carotid arteries. Mild to moderate segmental  narrowing of proximal left A2 segment. Otherwise no definite large vessel high-grade stenosis or occlusion.   -Crescentic mild hyperdensity projection along paraclinoid left internal carotid artery, probably representing a calcific plaque. Apparent adjacent subtle projection in paraclinoid left ICA measuring approximately 2 to 2.5 mm which may represent ulcerated plaque, although a shallow small aneurysm is also possible.   TTE    Left ventricular systolic function is normal. Calculated left ventricular EF = 57% Left ventricular ejection fraction appears to be 56 - 60%.    Left ventricular wall thickness is consistent with mild concentric hypertrophy.    Left atrial volume is severely increased.    The right atrial cavity is dilated.    Severe mitral valve regurgitation is present with an anteriorly-directed jet noted.    There is a mitral valve ring present.    Estimated right ventricular systolic pressure from tricuspid regurgitation is normal (<35 mmHg). Calculated right ventricular systolic pressure from tricuspid regurgitation is 33 mmHg.    ASSESSMENT:   Diagnosis Plan   1. H/O mitral valve repair        2. Nonrheumatic mitral valve regurgitation  Adult Transthoracic Echo Complete W/ Cont if Necessary Per Protocol      3. Paroxysmal atrial fibrillation        4. Dilated cardiomyopathy  Adult Transthoracic Echo Complete W/ Cont if Necessary Per Protocol            PLAN:  Congestive heart failure, chronic, systolic: Secondary to valvular heart disease.  Currently euvolemic and compensated with preserved functional capacity, [place] heart class I.    Vincenzo EF 23% by MUGA, 25% by echo.   Now with stable EF on medical therapy, 55 to 60%.  Currently on maximally tolerated medical therapy, continue current doses of carvedilol 6.25 mg every morning and 12.5 mg every afternoon and enalapril 2.5 mg p.o. daily.  Repeat echocardiographic evaluation pending.    Hypertension: Goal less than 130/80 mmHg.  Blood pressures  predominantly running less than 130/80 mmHg.  Continue current medical therapy.  History of whitecoat hypertension.    PVCs: Currently asymptomatic.  Secondary to underlying cardiomyopathy.    Continue beta-blockade.  Amiodarone was discontinued due to his potential risk of long-term toxicities, he did have an interval increase in his PVC burden after discontinuation of amiodarone.    Unclear what percentage of his underlying cardiomyopathy is due to history of valvular heart disease versus a developing PVC induced cardiomyopathy but it appears his EF is improving current medical therapy compared to his immediate postop EF after a transient drop to a letty EF of 23% in November 2018.  EP consultation complete, PVC ablation and antiarrhythmic were not recommended.      Persistent atrial fibrillation: asymptomatic.  Potentially transitioning to permanent A-fib.  Off anticoagulation status post left atrial clipping.    Mitral regurgitation: Persistent moderate to severe mitral regurgitation status post mitral valve repair.  Currently asymptomatic, [place] heart class I.  Will trend LV systolic function closely, would likely require a surgical mitral valve replacement in the future unless valve can be approached percutaneously.  Have obtained CD copy of echo imaging from recent hospitalization, all personally reviewed.    Hyperlipidemia: Goal LDL less than 70.  Has agreed to continue atorvastatin 2 patient 0 mg p.o. daily.    History of CVA, previous.  TIA: Continue aspirin 81 mg p.o. daily.  Status post left atrial appendage clipping, should not require chronic anticoagulation.   No SELENE documented following clipping.  Afterload well controlled based on home blood pressure monitoring.  Continue atorvastatin 20 mg p.o. daily.    Subjective  78-year-old gentleman with long-standing history of mitral valve prolapse with significant mitral regurgitation who underwent mitral valve repair and placement of a Xiong Mitral  "Valve Annuloplasty Ring (size #35) and NEELA clip at [facility] in August 2018.  Preoperative EF of 65%, immediately postoperative 45%.  Did develop postoperative atrial fibrillation which was treated by amiodarone and elective cardioversion on September 2, 2018 with maintenance of sinus rhythm since that time.  He was on a transient course of Coumadin but eventually decided to go off of this with his primary cardiologist due to dietary limitations.  Follow-up EF assessment by echo and MUGA scan in October and November 2018 revealed an EF of 25 to 30% by echo and an EF of 23% by MUGA scan.  Has had previous intermittent issues with blood pressure lability.  Still walking on a regular basis and doing daily calisthenics.  Blood pressures at home running less than 125/80 mmHg on current medical therapy.  Tolerating current medical regimen very well.  Has had recurrent episodes of expressive aphasia, potentially concerning for TIA.  CT head and neck revealing nonobstructive extracranial atherosclerosis and iCAD.  He describes stable functional status, still exercising on a regular basis.  Denies palpitations, dyspnea or chest discomfort.  Admitted to [facility] in June with potential TIA.  TTE report with LVEF of approximately 30%.  Completed 3-week course of dual antiplatelet therapy.    PHYSICAL EXAMINATION:  Vitals:    08/13/24 1416   BP: 140/90   BP Location: Right arm   Patient Position: Sitting   Pulse: 82   SpO2: 98%   Weight: 64.9 kg (143 lb)   Height: 172.7 cm (68\")       General Appearance:    Alert, cooperative, no distress, appears stated age   Head:    Normocephalic, without obvious abnormality, atraumatic   Eyes:    conjunctiva/corneas clear   Nose:   Nares normal, septum midline, mucosa normal, no drainage   Throat:   Lips, teeth and gums normal   Neck:   Supple, symmetrical, trachea midline, no carotid    bruit or JVD   Lungs:     Clear to auscultation bilaterally, respirations " unlabored   Chest Wall:    No tenderness or deformity    Heart:   Irregularly irregular, S1 and S2 normal, III/VI hs murmur apex, rub   or gallop, normal carotid impulse bilaterally without bruit.   Abdomen:     Soft, non-tender   Extremities:   Extremities normal, atraumatic, no cyanosis or edema   Pulses:   2+ and symmetric all extremities   Skin:   Skin color, texture, turgor normal, no rashes or lesions       Diagnostic Data:  Procedures  Lab Results   Component Value Date    TRIG 88 05/13/2024    HDL 42 05/13/2024     Lab Results   Component Value Date    GLUCOSE 102 (H) 05/13/2024    BUN 16 05/13/2024    CREATININE 0.83 05/13/2024     05/13/2024    K 5.0 05/13/2024     05/13/2024    CO2 27.3 05/13/2024     Lab Results   Component Value Date    HGBA1C 4.9 01/10/2023     Lab Results   Component Value Date    WBC 7.00 05/13/2024    HGB 15.6 05/13/2024    HCT 46.6 05/13/2024     05/13/2024       Allergies  No Known Allergies    Current Medications    Current Outpatient Medications:     aspirin 81 MG EC tablet, Take 1 tablet by mouth Daily., Disp: , Rfl:     carvedilol (COREG) 12.5 MG tablet, TAKE ONE-HALF (1/2) TABLET (6.25 MG) IN THE MORNING AND 1 TABLET (12.5 MG) IN THE EVENING, Disp: 135 tablet, Rfl: 3    cloNIDine (CATAPRES) 0.1 MG tablet, TAKE ONE AND ONE-HALF TABLETS (0.15 MG) EVERY MORNING AND TAKE ONE TABLET AT BEDTIME, Disp: 225 tablet, Rfl: 1    atorvastatin (LIPITOR) 20 MG tablet, Take 1 tablet by mouth Daily., Disp: 90 tablet, Rfl: 4    enalapril (Vasotec) 20 MG tablet, Take 1 tablet by mouth Daily., Disp: 90 tablet, Rfl: 3          ROS  Review of Systems   Eyes:  Negative for visual disturbance.   Cardiovascular:  Positive for irregular heartbeat. Negative for chest pain, dyspnea on exertion and palpitations.   Respiratory:  Negative for cough and shortness of breath.          SOCIAL HX  Social History     Socioeconomic History    Marital status:    Tobacco Use    Smoking  status: Never     Passive exposure: Never    Smokeless tobacco: Never   Vaping Use    Vaping status: Never Used   Substance and Sexual Activity    Alcohol use: Not Currently     Comment: rare    Drug use: No    Sexual activity: Yes     Partners: Female       FAMILY HX  Family History   Problem Relation Age of Onset    Heart attack Father         46    Transient ischemic attack Mother     Cancer Mother         83             Howard Rivera III, MD, FACC

## 2024-08-14 ENCOUNTER — HOSPITAL ENCOUNTER (OUTPATIENT)
Dept: CARDIOLOGY | Facility: HOSPITAL | Age: 79
Discharge: HOME | End: 2024-08-14

## 2024-08-14 DIAGNOSIS — Z00.6 ENCOUNTER FOR EXAMINATION FOR NORMAL COMPARISON AND CONTROL IN CLINICAL RESEARCH PROGRAM: ICD-10-CM

## 2024-10-24 DIAGNOSIS — I10 ESSENTIAL HYPERTENSION: ICD-10-CM

## 2024-10-24 RX ORDER — CLONIDINE HYDROCHLORIDE 0.1 MG/1
TABLET ORAL
Qty: 225 TABLET | Refills: 1 | Status: SHIPPED | OUTPATIENT
Start: 2024-10-24

## 2025-03-04 ENCOUNTER — HOSPITAL ENCOUNTER (OUTPATIENT)
Facility: HOSPITAL | Age: 80
Discharge: HOME | End: 2025-03-04
Admitting: INTERNAL MEDICINE
Payer: MEDICARE

## 2025-03-04 ENCOUNTER — OFFICE VISIT (OUTPATIENT)
Dept: CARDIOLOGY | Facility: CLINIC | Age: 80
End: 2025-03-04
Payer: MEDICARE

## 2025-03-04 VITALS — WEIGHT: 143 LBS | BODY MASS INDEX: 21.67 KG/M2 | HEIGHT: 68 IN

## 2025-03-04 VITALS
HEIGHT: 68 IN | HEART RATE: 55 BPM | WEIGHT: 147.7 LBS | SYSTOLIC BLOOD PRESSURE: 114 MMHG | DIASTOLIC BLOOD PRESSURE: 68 MMHG | OXYGEN SATURATION: 99 % | BODY MASS INDEX: 22.39 KG/M2

## 2025-03-04 DIAGNOSIS — I48.0 PAROXYSMAL ATRIAL FIBRILLATION: ICD-10-CM

## 2025-03-04 DIAGNOSIS — I34.0 NONRHEUMATIC MITRAL VALVE REGURGITATION: Primary | ICD-10-CM

## 2025-03-04 DIAGNOSIS — I34.0 NONRHEUMATIC MITRAL VALVE REGURGITATION: ICD-10-CM

## 2025-03-04 DIAGNOSIS — I10 PRIMARY HYPERTENSION: Chronic | ICD-10-CM

## 2025-03-04 DIAGNOSIS — I42.0 DILATED CARDIOMYOPATHY: ICD-10-CM

## 2025-03-04 LAB
ASCENDING AORTA: 3.8 CM
AV MEAN PRESS GRAD SYS DOP V1V2: 2.5 MMHG
AV VMAX SYS DOP: 106.9 CM/SEC
BH CV ECHO MEAS - AI P1/2T: 699.3 MSEC
BH CV ECHO MEAS - AO MAX PG: 4.6 MMHG
BH CV ECHO MEAS - AO ROOT DIAM: 4 CM
BH CV ECHO MEAS - AO V2 VTI: 20.5 CM
BH CV ECHO MEAS - AVA(I,D): 1.81 CM2
BH CV ECHO MEAS - EDV(CUBED): 175.6 ML
BH CV ECHO MEAS - EDV(MOD-SP2): 95.7 ML
BH CV ECHO MEAS - EDV(MOD-SP4): 135 ML
BH CV ECHO MEAS - EF(MOD-SP2): 61.1 %
BH CV ECHO MEAS - EF(MOD-SP4): 58.7 %
BH CV ECHO MEAS - ESV(CUBED): 46.7 ML
BH CV ECHO MEAS - ESV(MOD-SP2): 37.2 ML
BH CV ECHO MEAS - ESV(MOD-SP4): 55.7 ML
BH CV ECHO MEAS - FS: 35.7 %
BH CV ECHO MEAS - IVS/LVPW: 0.83 CM
BH CV ECHO MEAS - IVSD: 1 CM
BH CV ECHO MEAS - LA DIMENSION: 5.6 CM
BH CV ECHO MEAS - LAT PEAK E' VEL: 16.9 CM/SEC
BH CV ECHO MEAS - LV DIASTOLIC VOL/BSA (35-75): 76.4 CM2
BH CV ECHO MEAS - LV MASS(C)D: 249.3 GRAMS
BH CV ECHO MEAS - LV MAX PG: 1.51 MMHG
BH CV ECHO MEAS - LV MEAN PG: 1 MMHG
BH CV ECHO MEAS - LV SYSTOLIC VOL/BSA (12-30): 31.5 CM2
BH CV ECHO MEAS - LV V1 MAX: 61.1 CM/SEC
BH CV ECHO MEAS - LV V1 VTI: 11.8 CM
BH CV ECHO MEAS - LVIDD: 5.6 CM
BH CV ECHO MEAS - LVIDS: 3.6 CM
BH CV ECHO MEAS - LVOT AREA: 3.1 CM2
BH CV ECHO MEAS - LVOT DIAM: 2 CM
BH CV ECHO MEAS - LVPWD: 1.2 CM
BH CV ECHO MEAS - MED PEAK E' VEL: 8.5 CM/SEC
BH CV ECHO MEAS - MR MAX PG: 136.2 MMHG
BH CV ECHO MEAS - MR MAX VEL: 583.5 CM/SEC
BH CV ECHO MEAS - MR MEAN PG: 87 MMHG
BH CV ECHO MEAS - MR MEAN VEL: 439 CM/SEC
BH CV ECHO MEAS - MR VTI: 184 CM
BH CV ECHO MEAS - MV DEC SLOPE: 745.8 CM/SEC2
BH CV ECHO MEAS - MV DEC TIME: 0.17 SEC
BH CV ECHO MEAS - MV E MAX VEL: 121.3 CM/SEC
BH CV ECHO MEAS - MV MAX PG: 8.4 MMHG
BH CV ECHO MEAS - MV MEAN PG: 3 MMHG
BH CV ECHO MEAS - MV V2 VTI: 38 CM
BH CV ECHO MEAS - MVA(VTI): 0.98 CM2
BH CV ECHO MEAS - PA ACC TIME: 0.07 SEC
BH CV ECHO MEAS - PA V2 MAX: 101 CM/SEC
BH CV ECHO MEAS - RAP SYSTOLE: 15 MMHG
BH CV ECHO MEAS - RVSP: 50 MMHG
BH CV ECHO MEAS - SV(LVOT): 37.1 ML
BH CV ECHO MEAS - SV(MOD-SP2): 58.5 ML
BH CV ECHO MEAS - SV(MOD-SP4): 79.3 ML
BH CV ECHO MEAS - SVI(LVOT): 21 ML/M2
BH CV ECHO MEAS - SVI(MOD-SP2): 33.1 ML/M2
BH CV ECHO MEAS - SVI(MOD-SP4): 44.9 ML/M2
BH CV ECHO MEAS - TAPSE (>1.6): 1.93 CM
BH CV ECHO MEAS - TR MAX PG: 30.8 MMHG
BH CV ECHO MEAS - TR MAX VEL: 276.6 CM/SEC
BH CV ECHO MEASUREMENTS AVERAGE E/E' RATIO: 9.55
BH CV XLRA - RV BASE: 3.8 CM
BH CV XLRA - RV LENGTH: 8.3 CM
BH CV XLRA - RV MID: 3 CM
BH CV XLRA - TDI S': 10.8 CM/SEC
IVRT: 95 MS
LEFT ATRIUM VOLUME INDEX: 92.7 ML/M2
LV EF BIPLANE MOD: 59 %
MR PISA EROA: 0.41 CM2
PISA ALIASING VEL: 3.8 M/S
PISA RADIUS: 1 CM

## 2025-03-04 PROCEDURE — 93306 TTE W/DOPPLER COMPLETE: CPT

## 2025-03-04 PROCEDURE — 93306 TTE W/DOPPLER COMPLETE: CPT | Performed by: INTERNAL MEDICINE

## 2025-03-04 NOTE — PROGRESS NOTES
[facility] at [facility]  Office visit  Michael Mallory  1945    There is no work phone number on file.    VISIT DATE:  3/4/2025    PCP: Provider, No Known  [facility] [id number]    CC:  Chief Complaint   Patient presents with    H/O mitral valve repair    Cardiomyopathy     Cardiac studies and procedures:  October 2017 echo: EF 55 to 60%, grade 2 diastolic dysfunction, mild LVH, mild LV dilation-LVIDd 5.9 cm, LVIDs 3.7 cm, moderate to severe left atrial enlargement, moderate right atrial enlargement, prolapse of P2 segment with severe mitral regurgitation, estimated RVSP 50 to 55 mmHg.    August 2018 [facility]: Mitral valve repair with placement of a 35 mm Xiong annuloplasty ring, exclusion of left atrial appendage.    September 2018 echo [facility]: LVEF 45 ± 5, trivial MR status post mitral valve repair.    October 2018 echo  The left ventricular cavity is severely dilated. Global left ventricular ejection fraction is estimated at 25-30%.  Left ventricular wall thickness is consistent with mild concentric hypertrophy.  Left ventricular diastolic dysfunction (grade II) consistent with pseudonormalization.  Right ventricular cavity is borderline dilated.  Moderately reduced right ventricular systolic function noted.  Left atrial cavity size is moderate-to-severely dilated.  Moderate mitral valve regurgitation is present  Mild to moderate tricuspid valve regurgitation is present.  Mild dilation of the aortic root is present. No dissection or aneurysm is identified.    November 2018 MUGA  #1.  Dilated left ventricle.  #2.  Global left ventricular ejection fraction of 23%.    May 2019 echo  Calculated EF = 47.0%. Estimated EF appears to be in the range of 46 - 50%  The left ventricular cavity is borderline dilated.  Left ventricular diastolic dysfunction (grade II) consistent with pseudonormalization.  Moderate-to-severe mitral valve regurgitation is present  Left atrial cavity size is  moderately dilated.  Calculated right ventricular systolic pressure from tricuspid regurgitation is 35 mmHg.    October 2019 2-week  ECG monitor:An abnormal monitor study. Frequent PVCs, 33.5%, which are rarely symptomatic.    November 2019 echo  Estimated EF appears to be in the range of 46 - 50%.  Left ventricular wall thickness is consistent with borderline concentric hypertrophy.  The left ventricular cavity is borderline dilated.  Left atrial cavity size is severely dilated.  Moderate-to-severe mitral valve regurgitation is present  Xiong Mitral Valve Annuloplasty Ring (size #35) and NEELA clip at [facility] in August 2018.    October 2020 echo  Calculated left ventricular 3D EF = 60% Left ventricular ejection fraction appears to be 56 - 60%. Left ventricular systolic function is normal.  Left ventricular diastolic function is consistent with (grade III w/high LAP) restrictive pattern.  The left ventricular cavity is borderline dilated.  The left atrial cavity is severely dilated.  Moderate to severe mitral valve regurgitation is present with an anteriorly-directed jet noted.  Estimated right ventricular systolic pressure from tricuspid regurgitation is normal (<35 mmHg).  Mild dilation of the ascending aorta is present.    November 2021 TTE  Left ventricular ejection fraction appears to be 56 - 60%. Left ventricular systolic function is normal.  Left ventricular wall thickness is consistent with borderline concentric hypertrophy.  Left atrial volume is severely increased.  Status post mitral valve repair with 35 mm annuloplasty ring.  Severe mitral valve regurgitation is present.    December 2022   CT head neck  -Apparent focal severe stenosis of mid intradural left vertebral artery.  Mild to moderate stenosis of distal intradural right vertebral artery. Mild stenosis of proximal basilar artery.   -Moderate stenosis of right and mild stenosis of left supraclinoid internal carotid arteries. Mild to moderate  segmental narrowing of proximal left A2 segment. Otherwise no definite large vessel high-grade stenosis or occlusion.   -Crescentic mild hyperdensity projection along paraclinoid left internal carotid artery, probably representing a calcific plaque. Apparent adjacent subtle projection in paraclinoid left ICA measuring approximately 2 to 2.5 mm which may represent ulcerated plaque, although a shallow small aneurysm is also possible.   TTE    Left ventricular systolic function is normal. Calculated left ventricular EF = 57% Left ventricular ejection fraction appears to be 56 - 60%.    Left ventricular wall thickness is consistent with mild concentric hypertrophy.    Left atrial volume is severely increased.    The right atrial cavity is dilated.    Severe mitral valve regurgitation is present with an anteriorly-directed jet noted.    There is a mitral valve ring present.    Estimated right ventricular systolic pressure from tricuspid regurgitation is normal (<35 mmHg). Calculated right ventricular systolic pressure from tricuspid regurgitation is 33 mmHg.    March 2025 TTE    Left ventricular systolic function is normal. Calculated left ventricular EF = 59%    The left ventricular cavity is borderline dilated.    The left atrial cavity is severely dilated.    The right atrial cavity is severely  dilated.    Severe mitral valve regurgitation is present with an eccentric jet noted.    The mitral valve mean gradient is 3 mmHg.    There is a mitral valve ring present.    Moderate tricuspid valve regurgitation is present.    Estimated right ventricular systolic pressure from tricuspid regurgitation is moderately elevated (45-55 mmHg). Calculated right ventricular systolic pressure from tricuspid regurgitation is 50 mmHg.    ASSESSMENT:   Diagnosis Plan   1. Nonrheumatic mitral valve regurgitation        2. Paroxysmal atrial fibrillation        3. Primary hypertension              PLAN:  Congestive heart failure, chronic,  systolic: Secondary to valvular heart disease.  Currently euvolemic and compensated with preserved functional capacity, [place] heart class I.    Letty EF 23% by MUGA, 25% by echo.   Now with stable EF on medical therapy, 55 to 60%.  Currently on maximally tolerated medical therapy, continue current doses of carvedilol 6.25 mg every morning and 12.5 mg every afternoon and enalapril 2.5 mg p.o. daily.     Hypertension: Goal less than 130/80 mmHg.  Blood pressures predominantly running less than 130/80 mmHg.  Continue current medical therapy.  History of whitecoat hypertension.    PVCs: Currently asymptomatic.  Secondary to underlying cardiomyopathy.    Continue beta-blockade.  Amiodarone was discontinued due to his potential risk of long-term toxicities, he did have an interval increase in his PVC burden after discontinuation of amiodarone.    Unclear what percentage of his underlying cardiomyopathy is due to history of valvular heart disease versus a developing PVC induced cardiomyopathy but it appears his EF is improving current medical therapy compared to his immediate postop EF after a transient drop to a letty EF of 23% in November 2018.  EP consultation complete, PVC ablation and antiarrhythmic were not recommended.      Persistent atrial fibrillation: asymptomatic.  Potentially transitioning to permanent A-fib.  Off anticoagulation status post left atrial clipping.    Mitral regurgitation: Persistent moderate to severe mitral regurgitation status post mitral valve repair.  Currently asymptomatic, [place] heart class I.  Will trend LV systolic function closely, would likely require a surgical mitral valve replacement in the future unless valve can be approached percutaneously.  Have obtained CD copy of echo imaging from recent hospitalization, all personally reviewed.    Hyperlipidemia: Goal LDL less than 70.  Has agreed to continue atorvastatin 2 patient 0 mg p.o. daily.    History of CVA, previous.  TIA:  "Continue aspirin 81 mg p.o. daily.  Status post left atrial appendage clipping, should not require chronic anticoagulation.   No SELENE documented following clipping.  Afterload well controlled based on home blood pressure monitoring.  Continue atorvastatin 20 mg p.o. daily.    Subjective  79 year-old gentleman with long-standing history of mitral valve prolapse with significant mitral regurgitation who underwent mitral valve repair and placement of a Xiong Mitral Valve Annuloplasty Ring (size #35) and NEELA clip at [facility] in August 2018.  Preoperative EF of 65%, immediately postoperative 45%.  Did develop postoperative atrial fibrillation which was treated by amiodarone and elective cardioversion on September 2, 2018 with maintenance of sinus rhythm since that time.  He was on a transient course of Coumadin but eventually decided to go off of this with his primary cardiologist due to dietary limitations.  Follow-up EF assessment by echo and MUGA scan in October and November 2018 revealed an EF of 25 to 30% by echo and an EF of 23% by MUGA scan.  Has had previous intermittent issues with blood pressure lability.  Still walking on a regular basis and doing daily calisthenics. Tolerating current medical regimen very well.  Blood pressures predominately running less than 135/85 mmHg.    PHYSICAL EXAMINATION:  Vitals:    03/04/25 1328   BP: 114/68   BP Location: Left arm   Patient Position: Sitting   Cuff Size: Adult   Pulse: 55   SpO2: 99%   Weight: 67 kg (147 lb 11.2 oz)   Height: 172.7 cm (67.99\")       General Appearance:    Alert, cooperative, no distress, appears stated age   Head:    Normocephalic, without obvious abnormality, atraumatic   Eyes:    conjunctiva/corneas clear   Nose:   Nares normal, septum midline, mucosa normal, no drainage   Throat:   Lips, teeth and gums normal   Neck:   Supple, symmetrical, trachea midline, no carotid    bruit or JVD   Lungs:     Clear to auscultation bilaterally, " respirations unlabored   Chest Wall:    No tenderness or deformity    Heart:   Irregularly irregular, S1 and S2 normal, III/VI hs murmur apex, rub   or gallop, normal carotid impulse bilaterally without bruit.   Abdomen:     Soft, non-tender   Extremities:   Extremities normal, atraumatic, no cyanosis or edema   Pulses:   2+ and symmetric all extremities   Skin:   Skin color, texture, turgor normal, no rashes or lesions       Diagnostic Data:  Procedures  Lab Results   Component Value Date    TRIG 88 05/13/2024    HDL 42 05/13/2024     Lab Results   Component Value Date    GLUCOSE 102 (H) 05/13/2024    BUN 16 05/13/2024    CREATININE 0.83 05/13/2024     05/13/2024    K 5.0 05/13/2024     05/13/2024    CO2 27.3 05/13/2024     Lab Results   Component Value Date    HGBA1C 4.9 01/10/2023     Lab Results   Component Value Date    WBC 7.00 05/13/2024    HGB 15.6 05/13/2024    HCT 46.6 05/13/2024     05/13/2024       Allergies  No Known Allergies    Current Medications    Current Outpatient Medications:     aspirin 81 MG EC tablet, Take 1 tablet by mouth Daily., Disp: , Rfl:     atorvastatin (LIPITOR) 20 MG tablet, Take 1 tablet by mouth Daily., Disp: 90 tablet, Rfl: 4    carvedilol (COREG) 12.5 MG tablet, TAKE ONE-HALF (1/2) TABLET (6.25 MG) IN THE MORNING AND 1 TABLET (12.5 MG) IN THE EVENING, Disp: 135 tablet, Rfl: 3    cloNIDine (CATAPRES) 0.1 MG tablet, TAKE ONE AND ONE-HALF TABLETS (0.15 MG) EVERY MORNING AND TAKE ONE TABLET AT BEDTIME (Patient taking differently: TAKE ONE TABLET EVERY MORNING AND TAKE HALFTABLET AT BEDTIME), Disp: 225 tablet, Rfl: 1    enalapril (Vasotec) 20 MG tablet, Take 1 tablet by mouth Daily., Disp: 90 tablet, Rfl: 3          ROS  Review of Systems   Eyes:  Negative for visual disturbance.   Cardiovascular:  Positive for irregular heartbeat. Negative for chest pain, dyspnea on exertion and palpitations.   Respiratory:  Negative for cough and shortness of breath.           SOCIAL HX  Social History     Socioeconomic History    Marital status:    Tobacco Use    Smoking status: Never     Passive exposure: Never    Smokeless tobacco: Never   Vaping Use    Vaping status: Never Used   Substance and Sexual Activity    Alcohol use: Not Currently     Comment: Insignificant amounts    Drug use: No    Sexual activity: Yes     Partners: Female       FAMILY HX  Family History   Problem Relation Age of Onset    Heart attack Father         46    Transient ischemic attack Mother     Cancer Mother         83             Howard Rivera III, MD, FACC

## 2025-03-26 RX ORDER — ATORVASTATIN CALCIUM 20 MG/1
20 TABLET, FILM COATED ORAL DAILY
Qty: 90 TABLET | Refills: 1 | Status: SHIPPED | OUTPATIENT
Start: 2025-03-26

## 2025-03-26 RX ORDER — ATORVASTATIN CALCIUM 20 MG/1
20 TABLET, FILM COATED ORAL DAILY
Qty: 90 TABLET | Refills: 1 | OUTPATIENT
Start: 2025-03-26

## 2025-04-22 DIAGNOSIS — I10 ESSENTIAL HYPERTENSION: ICD-10-CM

## 2025-04-22 RX ORDER — CLONIDINE HYDROCHLORIDE 0.1 MG/1
TABLET ORAL
Qty: 225 TABLET | Refills: 3 | Status: SHIPPED | OUTPATIENT
Start: 2025-04-22

## 2025-07-21 RX ORDER — CARVEDILOL 12.5 MG/1
TABLET ORAL
Qty: 135 TABLET | Refills: 0 | Status: SHIPPED | OUTPATIENT
Start: 2025-07-21

## 2025-07-21 RX ORDER — ENALAPRIL MALEATE 20 MG/1
20 TABLET ORAL DAILY
Qty: 90 TABLET | Refills: 0 | Status: SHIPPED | OUTPATIENT
Start: 2025-07-21